# Patient Record
Sex: MALE | Race: BLACK OR AFRICAN AMERICAN | Employment: PART TIME | URBAN - METROPOLITAN AREA
[De-identification: names, ages, dates, MRNs, and addresses within clinical notes are randomized per-mention and may not be internally consistent; named-entity substitution may affect disease eponyms.]

---

## 2017-07-19 PROBLEM — M47.816 LUMBAR FACET ARTHROPATHY: Chronic | Status: ACTIVE | Noted: 2017-07-19

## 2017-07-19 PROBLEM — M46.1 SACROILIITIS (HCC): Chronic | Status: ACTIVE | Noted: 2017-07-19

## 2017-07-19 PROBLEM — M54.50 CHRONIC LOW BACK PAIN: Chronic | Status: ACTIVE | Noted: 2017-07-19

## 2017-07-19 PROBLEM — G89.29 CHRONIC LOW BACK PAIN: Chronic | Status: ACTIVE | Noted: 2017-07-19

## 2017-08-17 PROBLEM — M99.09 SOMATIC DYSFUNCTION OF BACK: Chronic | Status: ACTIVE | Noted: 2017-08-17

## 2017-09-20 PROBLEM — G89.4 CHRONIC PAIN SYNDROME: Chronic | Status: ACTIVE | Noted: 2017-09-20

## 2017-10-25 PROBLEM — M51.26 DISC DISPLACEMENT, LUMBAR: Chronic | Status: ACTIVE | Noted: 2017-10-25

## 2017-10-25 PROBLEM — M48.061 NEURAL FORAMINAL STENOSIS OF LUMBAR SPINE: Chronic | Status: ACTIVE | Noted: 2017-10-25

## 2018-07-27 RX ORDER — TRAMADOL HYDROCHLORIDE 50 MG/1
50 TABLET ORAL EVERY 6 HOURS PRN
COMMUNITY
End: 2019-06-27 | Stop reason: ALTCHOICE

## 2018-07-29 NOTE — H&P
Patient Name: Fay Burton is a 69 y.o. male    Chief Complaint of:  Decreased vision of the right eye    Present Illness:  Mature right cataract with decreased vision. Risks and complications as well as options and benefits were discussed with the patient and he has elected to proceed with right cataract extraction with intra ocular lens implant. History:    Past Medical History:   Diagnosis Date    Cancer [facility])     prostate just recently DX July 2018    History of blood transfusion     1985 after \"ulcer surgery\"    Hyperlipidemia     Hypertension     Pain     low back, sacral        Pertinent   Family History:  family history includes Cancer in his father and mother. Medications:    No current facility-administered medications for this encounter. Current Outpatient Prescriptions:     traMADol (ULTRAM) 50 MG tablet, Take 50 mg by mouth every 6 hours as needed for Pain. ., Disp: , Rfl:     atenolol (TENORMIN) 25 MG tablet, Take 1 tablet by mouth daily, Disp: , Rfl: 1    baclofen (LIORESAL) 10 MG tablet, 1/2 to 1 tablet nightly prn, Disp: 30 tablet, Rfl: 0    amLODIPine (NORVASC) 10 MG tablet, Take 10 mg by mouth daily, Disp: , Rfl:     simvastatin (ZOCOR) 10 MG tablet, Take 20 mg by mouth nightly , Disp: , Rfl:       Allergies:   Codeine and Motrin [ibuprofen micronized]       PHYSICAL EXAMINATION / GENERAL APPEARANCE:    HEAD: Mature right cataract with decreased vision effecting reading, driving and all routine home activities. The patient complains of severe glare and halos around headlights making it nearly impossible to continue to drive at night. Visual acuity is 20/60    HEART: negative    LUNGS: clear    ABDOMEN: exam deferred    OTHER SIGNIFICANT FINDINGS:  Cataract left eye, K. Sicca    IMPRESSION/INITIAL DIAGNOSIS:  Mature right cataract      Electronically signed by Jesus Rosario MD  on 7/29/2018 at 2:12 PM

## 2018-07-30 ENCOUNTER — ANESTHESIA EVENT (OUTPATIENT)
Dept: OPERATING ROOM | Age: 68
End: 2018-07-30
Payer: MEDICARE

## 2018-07-30 ASSESSMENT — LIFESTYLE VARIABLES: SMOKING_STATUS: 0

## 2018-07-30 NOTE — ANESTHESIA PRE PROCEDURE
Department of Anesthesiology  Preprocedure Note       Name:  Zelda Batres   Age:  69 y.o.  :  1950                                          MRN:  [id number]         Date:  2018      Surgeon: Misael Griffith):  Carmen Marrero MD    Procedure: Procedure(s):  CATARACT EXTRACTION WITH IOL RIGHT EYE    Medications prior to admission:   Prior to Admission medications    Medication Sig Start Date End Date Taking? Authorizing Provider   traMADol (ULTRAM) 50 MG tablet Take 50 mg by mouth every 6 hours as needed for Pain. .   Yes Historical Provider, MD   atenolol (TENORMIN) 25 MG tablet Take 1 tablet by mouth daily 17   Historical Provider, MD   baclofen (LIORESAL) 10 MG tablet 1/2 to 1 tablet nightly prn 18   Shanda Bumpers, MD   amLODIPine (NORVASC) 10 MG tablet Take 10 mg by mouth daily    Historical Provider, MD   simvastatin (ZOCOR) 10 MG tablet Take 20 mg by mouth nightly     Historical Provider, MD       Current medications:    No current facility-administered medications for this encounter. Current Outpatient Prescriptions   Medication Sig Dispense Refill    traMADol (ULTRAM) 50 MG tablet Take 50 mg by mouth every 6 hours as needed for Pain. 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    Coags: No results found for: PROTIME, INR, APTT    HCG (If Applicable): No results found for: PREGTESTUR, PREGSERUM, HCG, HCGQUANT     ABGs: No results found for: PHART, PO2ART, OWH6YQE, TTH0PKV, BEART, Y8HXQOWV     Type & Screen (If Applicable):  No results found for: LABABO, [address]    Anesthesia Evaluation  Patient summary reviewed and Nursing notes reviewed  Airway: Mallampati: II  TM distance: >3 FB   Neck ROM: full  Comment: Receding chin  Mouth opening: > = 3 FB Dental: normal exam         Pulmonary: breath sounds clear to auscultation      (-) not a current smoker                           Cardiovascular:  Exercise tolerance: good (>4 METS),   (+) hypertension:, hyperlipidemia        Rhythm: regular        Cleared by cardiology     Beta Blocker:  Dose within 24 Hrs        PE comment: Bradycardia  Faint HS   Neuro/Psych:                ROS comment: Low back pain GI/Hepatic/Renal:   (+) PUD,          ROS comment: CA prostate. Endo/Other:    (+) : arthritis:., .                 Abdominal:         (-) obese     Vascular:           ROS comment: Blood transfusion. Anesthesia Plan      MAC     ASA 3       Induction: intravenous. Anesthetic plan and risks discussed with patient and spouse. Plan discussed with CRNA.                 Jas Galvin MD   7/30/2018

## 2018-07-31 ENCOUNTER — ANESTHESIA (OUTPATIENT)
Dept: OPERATING ROOM | Age: 68
End: 2018-07-31
Payer: MEDICARE

## 2018-07-31 ENCOUNTER — HOSPITAL ENCOUNTER (OUTPATIENT)
Age: 68
Setting detail: OUTPATIENT SURGERY
Discharge: HOME | End: 2018-07-31
Attending: OPHTHALMOLOGY | Admitting: OPHTHALMOLOGY
Payer: MEDICARE

## 2018-07-31 VITALS
WEIGHT: 171 LBS | DIASTOLIC BLOOD PRESSURE: 71 MMHG | RESPIRATION RATE: 16 BRPM | HEIGHT: 69 IN | OXYGEN SATURATION: 96 % | HEART RATE: 44 BPM | SYSTOLIC BLOOD PRESSURE: 143 MMHG | BODY MASS INDEX: 25.33 KG/M2

## 2018-07-31 VITALS
SYSTOLIC BLOOD PRESSURE: 112 MMHG | RESPIRATION RATE: 13 BRPM | DIASTOLIC BLOOD PRESSURE: 64 MMHG | OXYGEN SATURATION: 100 %

## 2018-07-31 PROBLEM — H26.9 RIGHT CATARACT: Status: ACTIVE | Noted: 2018-07-31

## 2018-07-31 PROCEDURE — 7100000010 HC PHASE II RECOVERY - FIRST 15 MIN: Performed by: OPHTHALMOLOGY

## 2018-07-31 PROCEDURE — 3600000003 HC SURGERY LEVEL 3 BASE: Performed by: OPHTHALMOLOGY

## 2018-07-31 PROCEDURE — 2500000003 HC RX 250 WO HCPCS: Performed by: OPHTHALMOLOGY

## 2018-07-31 PROCEDURE — 2580000003 HC RX 258: Performed by: ANESTHESIOLOGY

## 2018-07-31 PROCEDURE — 6360000002 HC RX W HCPCS: Performed by: NURSE ANESTHETIST, CERTIFIED REGISTERED

## 2018-07-31 PROCEDURE — 7100000011 HC PHASE II RECOVERY - ADDTL 15 MIN: Performed by: OPHTHALMOLOGY

## 2018-07-31 PROCEDURE — V2632 POST CHMBR INTRAOCULAR LENS: HCPCS | Performed by: OPHTHALMOLOGY

## 2018-07-31 PROCEDURE — 6370000000 HC RX 637 (ALT 250 FOR IP): Performed by: OPHTHALMOLOGY

## 2018-07-31 PROCEDURE — 2709999900 HC NON-CHARGEABLE SUPPLY: Performed by: OPHTHALMOLOGY

## 2018-07-31 PROCEDURE — 2500000003 HC RX 250 WO HCPCS: Performed by: NURSE ANESTHETIST, CERTIFIED REGISTERED

## 2018-07-31 PROCEDURE — 3700000000 HC ANESTHESIA ATTENDED CARE: Performed by: OPHTHALMOLOGY

## 2018-07-31 DEVICE — LENS INTOCU +19.5 DIOPT A CONSTANT 118.8 L13MM DIA6MM 0DEG: Type: IMPLANTABLE DEVICE | Site: EYE | Status: FUNCTIONAL

## 2018-07-31 RX ORDER — FENTANYL CITRATE 50 UG/ML
50 INJECTION, SOLUTION INTRAMUSCULAR; INTRAVENOUS EVERY 5 MIN PRN
Status: DISCONTINUED | OUTPATIENT
Start: 2018-07-31 | End: 2018-07-31 | Stop reason: HOSPADM

## 2018-07-31 RX ORDER — FLURBIPROFEN SODIUM 0.3 MG/ML
1 SOLUTION/ DROPS OPHTHALMIC
Status: COMPLETED | OUTPATIENT
Start: 2018-07-31 | End: 2018-07-31

## 2018-07-31 RX ORDER — PHENYLEPHRINE HYDROCHLORIDE 100 MG/ML
1 SOLUTION/ DROPS OPHTHALMIC PRN
Status: DISCONTINUED | OUTPATIENT
Start: 2018-07-31 | End: 2018-07-31 | Stop reason: HOSPADM

## 2018-07-31 RX ORDER — ALFENTANIL HYDROCHLORIDE 500 UG/ML
INJECTION INTRAVENOUS PRN
Status: DISCONTINUED | OUTPATIENT
Start: 2018-07-31 | End: 2018-07-31 | Stop reason: SDUPTHER

## 2018-07-31 RX ORDER — MIDAZOLAM HYDROCHLORIDE 1 MG/ML
INJECTION INTRAMUSCULAR; INTRAVENOUS PRN
Status: DISCONTINUED | OUTPATIENT
Start: 2018-07-31 | End: 2018-07-31 | Stop reason: SDUPTHER

## 2018-07-31 RX ORDER — PHENYLEPHRINE HCL 2.5 %
1 DROPS OPHTHALMIC (EYE)
Status: COMPLETED | OUTPATIENT
Start: 2018-07-31 | End: 2018-07-31

## 2018-07-31 RX ORDER — PROMETHAZINE HYDROCHLORIDE 25 MG/ML
25 INJECTION, SOLUTION INTRAMUSCULAR; INTRAVENOUS
Status: DISCONTINUED | OUTPATIENT
Start: 2018-07-31 | End: 2018-07-31 | Stop reason: HOSPADM

## 2018-07-31 RX ORDER — HYDROCODONE BITARTRATE AND ACETAMINOPHEN 5; 325 MG/1; MG/1
2 TABLET ORAL PRN
Status: DISCONTINUED | OUTPATIENT
Start: 2018-07-31 | End: 2018-07-31 | Stop reason: HOSPADM

## 2018-07-31 RX ORDER — CYCLOPENTOLATE HYDROCHLORIDE 10 MG/ML
1 SOLUTION/ DROPS OPHTHALMIC
Status: COMPLETED | OUTPATIENT
Start: 2018-07-31 | End: 2018-07-31

## 2018-07-31 RX ORDER — TETRACAINE HYDROCHLORIDE 5 MG/ML
1 SOLUTION OPHTHALMIC ONCE
Status: COMPLETED | OUTPATIENT
Start: 2018-07-31 | End: 2018-07-31

## 2018-07-31 RX ORDER — MORPHINE SULFATE 2 MG/ML
1 INJECTION, SOLUTION INTRAMUSCULAR; INTRAVENOUS EVERY 5 MIN PRN
Status: DISCONTINUED | OUTPATIENT
Start: 2018-07-31 | End: 2018-07-31 | Stop reason: HOSPADM

## 2018-07-31 RX ORDER — TETRACAINE HYDROCHLORIDE 5 MG/ML
SOLUTION OPHTHALMIC PRN
Status: DISCONTINUED | OUTPATIENT
Start: 2018-07-31 | End: 2018-07-31 | Stop reason: HOSPADM

## 2018-07-31 RX ORDER — HYDROCODONE BITARTRATE AND ACETAMINOPHEN 5; 325 MG/1; MG/1
1 TABLET ORAL PRN
Status: DISCONTINUED | OUTPATIENT
Start: 2018-07-31 | End: 2018-07-31 | Stop reason: HOSPADM

## 2018-07-31 RX ORDER — MEPERIDINE HYDROCHLORIDE 25 MG/ML
12.5 INJECTION INTRAMUSCULAR; INTRAVENOUS; SUBCUTANEOUS EVERY 5 MIN PRN
Status: DISCONTINUED | OUTPATIENT
Start: 2018-07-31 | End: 2018-07-31 | Stop reason: HOSPADM

## 2018-07-31 RX ORDER — BALANCED SALT SOLUTION 6.4; .75; .48; .3; 3.9; 1.7 MG/ML; MG/ML; MG/ML; MG/ML; MG/ML; MG/ML
SOLUTION OPHTHALMIC PRN
Status: DISCONTINUED | OUTPATIENT
Start: 2018-07-31 | End: 2018-07-31 | Stop reason: HOSPADM

## 2018-07-31 RX ORDER — LABETALOL HYDROCHLORIDE 5 MG/ML
5 INJECTION, SOLUTION INTRAVENOUS EVERY 10 MIN PRN
Status: DISCONTINUED | OUTPATIENT
Start: 2018-07-31 | End: 2018-07-31 | Stop reason: HOSPADM

## 2018-07-31 RX ORDER — SODIUM CHLORIDE, SODIUM LACTATE, POTASSIUM CHLORIDE, CALCIUM CHLORIDE 600; 310; 30; 20 MG/100ML; MG/100ML; MG/100ML; MG/100ML
INJECTION, SOLUTION INTRAVENOUS CONTINUOUS
Status: DISCONTINUED | OUTPATIENT
Start: 2018-07-31 | End: 2018-07-31 | Stop reason: HOSPADM

## 2018-07-31 RX ORDER — HYDRALAZINE HYDROCHLORIDE 20 MG/ML
5 INJECTION INTRAMUSCULAR; INTRAVENOUS EVERY 10 MIN PRN
Status: DISCONTINUED | OUTPATIENT
Start: 2018-07-31 | End: 2018-07-31 | Stop reason: HOSPADM

## 2018-07-31 RX ORDER — DIPHENHYDRAMINE HYDROCHLORIDE 50 MG/ML
12.5 INJECTION INTRAMUSCULAR; INTRAVENOUS
Status: DISCONTINUED | OUTPATIENT
Start: 2018-07-31 | End: 2018-07-31 | Stop reason: HOSPADM

## 2018-07-31 RX ADMIN — PHENYLEPHRINE HYDROCHLORIDE 1 DROP: 25 SOLUTION/ DROPS OPHTHALMIC at 08:10

## 2018-07-31 RX ADMIN — MIDAZOLAM 1 MG: 1 INJECTION INTRAMUSCULAR; INTRAVENOUS at 09:36

## 2018-07-31 RX ADMIN — MIDAZOLAM 1 MG: 1 INJECTION INTRAMUSCULAR; INTRAVENOUS at 09:34

## 2018-07-31 RX ADMIN — CYCLOPENTOLATE HYDROCHLORIDE 1 DROP: 10 SOLUTION/ DROPS OPHTHALMIC at 08:10

## 2018-07-31 RX ADMIN — SODIUM CHLORIDE, POTASSIUM CHLORIDE, SODIUM LACTATE AND CALCIUM CHLORIDE: 600; 310; 30; 20 INJECTION, SOLUTION INTRAVENOUS at 08:30

## 2018-07-31 RX ADMIN — ALFENTANIL HYDROCHLORIDE 250 MCG: 500 INJECTION INTRAVENOUS at 09:36

## 2018-07-31 RX ADMIN — ALFENTANIL HYDROCHLORIDE 250 MCG: 500 INJECTION INTRAVENOUS at 09:34

## 2018-07-31 RX ADMIN — FLURBIPROFEN SODIUM 1 DROP: 0.3 SOLUTION/ DROPS OPHTHALMIC at 08:10

## 2018-07-31 RX ADMIN — PHENYLEPHRINE HYDROCHLORIDE 1 DROP: 25 SOLUTION/ DROPS OPHTHALMIC at 08:20

## 2018-07-31 RX ADMIN — FLURBIPROFEN SODIUM 1 DROP: 0.3 SOLUTION/ DROPS OPHTHALMIC at 08:15

## 2018-07-31 RX ADMIN — FLURBIPROFEN SODIUM 1 DROP: 0.3 SOLUTION/ DROPS OPHTHALMIC at 08:20

## 2018-07-31 RX ADMIN — TETRACAINE HYDROCHLORIDE 1 DROP: 25 LIQUID OPHTHALMIC at 08:37

## 2018-07-31 RX ADMIN — CYCLOPENTOLATE HYDROCHLORIDE 1 DROP: 10 SOLUTION/ DROPS OPHTHALMIC at 08:20

## 2018-07-31 RX ADMIN — PHENYLEPHRINE HYDROCHLORIDE 1 DROP: 25 SOLUTION/ DROPS OPHTHALMIC at 08:15

## 2018-07-31 RX ADMIN — CYCLOPENTOLATE HYDROCHLORIDE 1 DROP: 10 SOLUTION/ DROPS OPHTHALMIC at 08:15

## 2018-07-31 ASSESSMENT — PULMONARY FUNCTION TESTS
PIF_VALUE: 0

## 2018-07-31 ASSESSMENT — PAIN SCALES - GENERAL
PAINLEVEL_OUTOF10: 0

## 2018-07-31 ASSESSMENT — PAIN - FUNCTIONAL ASSESSMENT: PAIN_FUNCTIONAL_ASSESSMENT: 0-10

## 2018-07-31 NOTE — ANESTHESIA POSTPROCEDURE EVALUATION
Department of Anesthesiology  Postprocedure Note    Patient: Edmundo Garcia  MRN: [id number]  YOB: 1950  Date of evaluation: 7/31/2018  Time:  9:58 AM     Procedure Summary     Date:  07/31/18 Room / Location:  [address]    Anesthesia Start:  0934 Anesthesia Stop:  Nel Manleyfitoedna    Procedure:  CATARACT EXTRACTION WITH IOL RIGHT EYE (Right ) Diagnosis:  (CATARACT RIGHT)    Surgeon:  Jennifer Fraire MD Responsible Provider:  Basilia Sood MD    Anesthesia Type:  MAC ASA Status:  3          Anesthesia Type: MAC    Devi Phase I: Devi Score: 10    Devi Phase II: Devi Score: 10    Last vitals: Reviewed and per EMR flowsheets.        Anesthesia Post Evaluation    Patient location during evaluation: PACU  Patient participation: complete - patient participated  Level of consciousness: awake and alert  Airway patency: patent  Nausea & Vomiting: no nausea and no vomiting  Complications: no  Cardiovascular status: bradycardic (bradycardia preop)  Respiratory status: room air and spontaneous ventilation  Hydration status: stable

## 2019-06-25 NOTE — H&P
grossly intact. Motor is 5 out of 5 bilaterally. Cerebellar finger to nose, heel to shin intact. Sensory is intact. Babinski down going, Romberg negative, and gait is normal.  SKIN:  no bruising or bleeding, normal skin color, texture, turgor and no redness, warmth, or swelling    Assessment:  Active Problems:    * No active hospital problems. *  Resolved Problems:    * No resolved hospital problems. *      Plan:  1. Left cataract extraction with intra ocular lens implant.     Electronically signed by Mami Ferro MD on 6/25/19 at 1:02 PM

## 2019-06-27 ENCOUNTER — HOSPITAL ENCOUNTER (OUTPATIENT)
Age: 69
Discharge: HOME | End: 2019-06-27
Payer: MEDICARE

## 2019-06-27 PROCEDURE — 93005 ELECTROCARDIOGRAM TRACING: CPT | Performed by: ANESTHESIOLOGY

## 2019-06-28 ENCOUNTER — ANESTHESIA EVENT (OUTPATIENT)
Dept: OPERATING ROOM | Age: 69
End: 2019-06-28
Payer: MEDICARE

## 2019-06-28 LAB
EKG ATRIAL RATE: 47 BPM
EKG P AXIS: 63 DEGREES
EKG P-R INTERVAL: 160 MS
EKG Q-T INTERVAL: 416 MS
EKG QRS DURATION: 70 MS
EKG QTC CALCULATION (BAZETT): 368 MS
EKG R AXIS: 21 DEGREES
EKG T AXIS: 44 DEGREES
EKG VENTRICULAR RATE: 47 BPM

## 2019-06-28 PROCEDURE — 93010 ELECTROCARDIOGRAM REPORT: CPT | Performed by: INTERNAL MEDICINE

## 2019-06-28 ASSESSMENT — LIFESTYLE VARIABLES: SMOKING_STATUS: 0

## 2019-07-02 ENCOUNTER — ANESTHESIA (OUTPATIENT)
Dept: OPERATING ROOM | Age: 69
End: 2019-07-02
Payer: MEDICARE

## 2019-07-02 ENCOUNTER — HOSPITAL ENCOUNTER (OUTPATIENT)
Age: 69
Setting detail: OUTPATIENT SURGERY
Discharge: HOME | End: 2019-07-02
Attending: OPHTHALMOLOGY | Admitting: OPHTHALMOLOGY
Payer: MEDICARE

## 2019-07-02 VITALS — OXYGEN SATURATION: 100 % | SYSTOLIC BLOOD PRESSURE: 116 MMHG | DIASTOLIC BLOOD PRESSURE: 69 MMHG

## 2019-07-02 VITALS
WEIGHT: 166 LBS | OXYGEN SATURATION: 100 % | HEART RATE: 48 BPM | TEMPERATURE: 97 F | RESPIRATION RATE: 16 BRPM | BODY MASS INDEX: 24.59 KG/M2 | HEIGHT: 69 IN | SYSTOLIC BLOOD PRESSURE: 139 MMHG | DIASTOLIC BLOOD PRESSURE: 77 MMHG

## 2019-07-02 PROBLEM — H26.9 LEFT CATARACT: Status: ACTIVE | Noted: 2019-07-02

## 2019-07-02 PROCEDURE — V2632 POST CHMBR INTRAOCULAR LENS: HCPCS | Performed by: OPHTHALMOLOGY

## 2019-07-02 PROCEDURE — 3600000003 HC SURGERY LEVEL 3 BASE: Performed by: OPHTHALMOLOGY

## 2019-07-02 PROCEDURE — 7100000011 HC PHASE II RECOVERY - ADDTL 15 MIN: Performed by: OPHTHALMOLOGY

## 2019-07-02 PROCEDURE — 6360000002 HC RX W HCPCS: Performed by: NURSE ANESTHETIST, CERTIFIED REGISTERED

## 2019-07-02 PROCEDURE — 6370000000 HC RX 637 (ALT 250 FOR IP): Performed by: OPHTHALMOLOGY

## 2019-07-02 PROCEDURE — 2500000003 HC RX 250 WO HCPCS: Performed by: OPHTHALMOLOGY

## 2019-07-02 PROCEDURE — 7100000010 HC PHASE II RECOVERY - FIRST 15 MIN: Performed by: OPHTHALMOLOGY

## 2019-07-02 PROCEDURE — 2580000003 HC RX 258: Performed by: ANESTHESIOLOGY

## 2019-07-02 PROCEDURE — 3700000000 HC ANESTHESIA ATTENDED CARE: Performed by: OPHTHALMOLOGY

## 2019-07-02 PROCEDURE — 3600000013 HC SURGERY LEVEL 3 ADDTL 15MIN: Performed by: OPHTHALMOLOGY

## 2019-07-02 PROCEDURE — 3700000001 HC ADD 15 MINUTES (ANESTHESIA): Performed by: OPHTHALMOLOGY

## 2019-07-02 PROCEDURE — 2709999900 HC NON-CHARGEABLE SUPPLY: Performed by: OPHTHALMOLOGY

## 2019-07-02 PROCEDURE — 2500000003 HC RX 250 WO HCPCS: Performed by: NURSE ANESTHETIST, CERTIFIED REGISTERED

## 2019-07-02 DEVICE — LENS INTOCU +20.0 DIOPT A CONSTANT 118.8 L13MM DIA6MM 0DEG: Type: IMPLANTABLE DEVICE | Site: EYE | Status: FUNCTIONAL

## 2019-07-02 RX ORDER — LABETALOL 20 MG/4 ML (5 MG/ML) INTRAVENOUS SYRINGE
5 EVERY 10 MIN PRN
Status: DISCONTINUED | OUTPATIENT
Start: 2019-07-02 | End: 2019-07-02 | Stop reason: HOSPADM

## 2019-07-02 RX ORDER — BALANCED SALT SOLUTION 6.4; .75; .48; .3; 3.9; 1.7 MG/ML; MG/ML; MG/ML; MG/ML; MG/ML; MG/ML
SOLUTION OPHTHALMIC PRN
Status: DISCONTINUED | OUTPATIENT
Start: 2019-07-02 | End: 2019-07-02 | Stop reason: ALTCHOICE

## 2019-07-02 RX ORDER — DIPHENHYDRAMINE HYDROCHLORIDE 50 MG/ML
12.5 INJECTION INTRAMUSCULAR; INTRAVENOUS
Status: DISCONTINUED | OUTPATIENT
Start: 2019-07-02 | End: 2019-07-02 | Stop reason: HOSPADM

## 2019-07-02 RX ORDER — MORPHINE SULFATE 2 MG/ML
1 INJECTION, SOLUTION INTRAMUSCULAR; INTRAVENOUS EVERY 5 MIN PRN
Status: DISCONTINUED | OUTPATIENT
Start: 2019-07-02 | End: 2019-07-02 | Stop reason: HOSPADM

## 2019-07-02 RX ORDER — FENTANYL CITRATE 50 UG/ML
50 INJECTION, SOLUTION INTRAMUSCULAR; INTRAVENOUS EVERY 5 MIN PRN
Status: DISCONTINUED | OUTPATIENT
Start: 2019-07-02 | End: 2019-07-02 | Stop reason: HOSPADM

## 2019-07-02 RX ORDER — SODIUM CHLORIDE, SODIUM LACTATE, POTASSIUM CHLORIDE, CALCIUM CHLORIDE 600; 310; 30; 20 MG/100ML; MG/100ML; MG/100ML; MG/100ML
INJECTION, SOLUTION INTRAVENOUS CONTINUOUS
Status: DISCONTINUED | OUTPATIENT
Start: 2019-07-02 | End: 2019-07-02 | Stop reason: HOSPADM

## 2019-07-02 RX ORDER — PHENYLEPHRINE HYDROCHLORIDE 100 MG/ML
1 SOLUTION/ DROPS OPHTHALMIC PRN
Status: DISCONTINUED | OUTPATIENT
Start: 2019-07-02 | End: 2019-07-02 | Stop reason: HOSPADM

## 2019-07-02 RX ORDER — HYDROCODONE BITARTRATE AND ACETAMINOPHEN 5; 325 MG/1; MG/1
2 TABLET ORAL PRN
Status: DISCONTINUED | OUTPATIENT
Start: 2019-07-02 | End: 2019-07-02 | Stop reason: HOSPADM

## 2019-07-02 RX ORDER — MIDAZOLAM HYDROCHLORIDE 1 MG/ML
INJECTION INTRAMUSCULAR; INTRAVENOUS PRN
Status: DISCONTINUED | OUTPATIENT
Start: 2019-07-02 | End: 2019-07-02 | Stop reason: SDUPTHER

## 2019-07-02 RX ORDER — FLURBIPROFEN SODIUM 0.3 MG/ML
1 SOLUTION/ DROPS OPHTHALMIC
Status: COMPLETED | OUTPATIENT
Start: 2019-07-02 | End: 2019-07-02

## 2019-07-02 RX ORDER — ALFENTANIL HYDROCHLORIDE 500 UG/ML
INJECTION INTRAVENOUS PRN
Status: DISCONTINUED | OUTPATIENT
Start: 2019-07-02 | End: 2019-07-02 | Stop reason: SDUPTHER

## 2019-07-02 RX ORDER — HYDRALAZINE HYDROCHLORIDE 20 MG/ML
5 INJECTION INTRAMUSCULAR; INTRAVENOUS EVERY 10 MIN PRN
Status: DISCONTINUED | OUTPATIENT
Start: 2019-07-02 | End: 2019-07-02 | Stop reason: HOSPADM

## 2019-07-02 RX ORDER — HYDROMORPHONE HYDROCHLORIDE 1 MG/ML
0.25 INJECTION, SOLUTION INTRAMUSCULAR; INTRAVENOUS; SUBCUTANEOUS EVERY 5 MIN PRN
Status: DISCONTINUED | OUTPATIENT
Start: 2019-07-02 | End: 2019-07-02 | Stop reason: HOSPADM

## 2019-07-02 RX ORDER — MEPERIDINE HYDROCHLORIDE 50 MG/ML
12.5 INJECTION INTRAMUSCULAR; INTRAVENOUS; SUBCUTANEOUS EVERY 5 MIN PRN
Status: DISCONTINUED | OUTPATIENT
Start: 2019-07-02 | End: 2019-07-02 | Stop reason: HOSPADM

## 2019-07-02 RX ORDER — PROMETHAZINE HYDROCHLORIDE 25 MG/ML
25 INJECTION, SOLUTION INTRAMUSCULAR; INTRAVENOUS
Status: DISCONTINUED | OUTPATIENT
Start: 2019-07-02 | End: 2019-07-02 | Stop reason: HOSPADM

## 2019-07-02 RX ORDER — TETRACAINE HYDROCHLORIDE 5 MG/ML
SOLUTION OPHTHALMIC PRN
Status: DISCONTINUED | OUTPATIENT
Start: 2019-07-02 | End: 2019-07-02 | Stop reason: ALTCHOICE

## 2019-07-02 RX ORDER — PHENYLEPHRINE HCL 2.5 %
1 DROPS OPHTHALMIC (EYE)
Status: COMPLETED | OUTPATIENT
Start: 2019-07-02 | End: 2019-07-02

## 2019-07-02 RX ORDER — CYCLOPENTOLATE HYDROCHLORIDE 10 MG/ML
1 SOLUTION/ DROPS OPHTHALMIC
Status: COMPLETED | OUTPATIENT
Start: 2019-07-02 | End: 2019-07-02

## 2019-07-02 RX ORDER — HYDROCODONE BITARTRATE AND ACETAMINOPHEN 5; 325 MG/1; MG/1
1 TABLET ORAL PRN
Status: DISCONTINUED | OUTPATIENT
Start: 2019-07-02 | End: 2019-07-02 | Stop reason: HOSPADM

## 2019-07-02 RX ORDER — TETRACAINE HYDROCHLORIDE 5 MG/ML
1 SOLUTION OPHTHALMIC ONCE
Status: COMPLETED | OUTPATIENT
Start: 2019-07-02 | End: 2019-07-02

## 2019-07-02 RX ADMIN — CYCLOPENTOLATE HYDROCHLORIDE 1 DROP: 10 SOLUTION/ DROPS OPHTHALMIC at 05:45

## 2019-07-02 RX ADMIN — TETRACAINE HYDROCHLORIDE 1 DROP: 25 LIQUID OPHTHALMIC at 06:11

## 2019-07-02 RX ADMIN — MIDAZOLAM 1 MG: 1 INJECTION INTRAMUSCULAR; INTRAVENOUS at 06:32

## 2019-07-02 RX ADMIN — PHENYLEPHRINE HYDROCHLORIDE 1 DROP: 25 SOLUTION/ DROPS OPHTHALMIC at 05:35

## 2019-07-02 RX ADMIN — PHENYLEPHRINE HYDROCHLORIDE 1 DROP: 25 SOLUTION/ DROPS OPHTHALMIC at 05:40

## 2019-07-02 RX ADMIN — CYCLOPENTOLATE HYDROCHLORIDE 1 DROP: 10 SOLUTION/ DROPS OPHTHALMIC at 05:35

## 2019-07-02 RX ADMIN — MIDAZOLAM 1 MG: 1 INJECTION INTRAMUSCULAR; INTRAVENOUS at 06:35

## 2019-07-02 RX ADMIN — PHENYLEPHRINE HYDROCHLORIDE 1 DROP: 25 SOLUTION/ DROPS OPHTHALMIC at 05:45

## 2019-07-02 RX ADMIN — CYCLOPENTOLATE HYDROCHLORIDE 1 DROP: 10 SOLUTION/ DROPS OPHTHALMIC at 05:40

## 2019-07-02 RX ADMIN — ALFENTANIL HYDROCHLORIDE 250 MCG: 500 INJECTION INTRAVENOUS at 06:35

## 2019-07-02 RX ADMIN — FLURBIPROFEN SODIUM 1 DROP: 0.3 SOLUTION/ DROPS OPHTHALMIC at 05:45

## 2019-07-02 RX ADMIN — FLURBIPROFEN SODIUM 1 DROP: 0.3 SOLUTION/ DROPS OPHTHALMIC at 05:40

## 2019-07-02 RX ADMIN — ALFENTANIL HYDROCHLORIDE 250 MCG: 500 INJECTION INTRAVENOUS at 06:38

## 2019-07-02 RX ADMIN — FLURBIPROFEN SODIUM 1 DROP: 0.3 SOLUTION/ DROPS OPHTHALMIC at 05:35

## 2019-07-02 RX ADMIN — SODIUM CHLORIDE, POTASSIUM CHLORIDE, SODIUM LACTATE AND CALCIUM CHLORIDE: 600; 310; 30; 20 INJECTION, SOLUTION INTRAVENOUS at 06:09

## 2019-07-02 RX ADMIN — ALFENTANIL HYDROCHLORIDE 250 MCG: 500 INJECTION INTRAVENOUS at 06:43

## 2019-07-02 ASSESSMENT — PULMONARY FUNCTION TESTS
PIF_VALUE: 0

## 2019-07-02 ASSESSMENT — PAIN - FUNCTIONAL ASSESSMENT: PAIN_FUNCTIONAL_ASSESSMENT: 0-10

## 2019-07-02 ASSESSMENT — PAIN SCALES - GENERAL
PAINLEVEL_OUTOF10: 0

## 2019-07-02 NOTE — OP NOTE
PREOPERATIVE DIAGNOSIS:  Complex Left Cataract    POSTOPERATIVE DIAGNOSIS:  Complex Left Cataract    PROCEDURE:  Left phacoemulsification of complex cataract with intraocular lens implant. ANESTHESIA:  Local Mac    ESTIMATED BLOOD LOSS:  Minimal    COMPLICATIONS:  None    DESCRIPTION OF PROCEDURE:  The patient was brought into the operating room. The operative eye was marked, which was the left eye. The left eye was then prepped with a full-strength Betadine preparation. The periorbital area was copiously washed with Betadine. The lashes were washed with Betadine. Dilute Betadine was then also put in the inferior and superior fornices and left in place for approximately a minute or 2. This was then irrigated with sterile water. The face was then wiped and the preparation was repeated 1 more time. The drape was then placed over the operative eye with the sticky adhesive placed at the lid margins so that it draped the lashes out of the operative field superiorly and inferiorly, as well as isolated the meibomian glands behind the drape. This cleared the operative field of any meibomian gland secretions and eyelashes. Next, a lid speculum was positioned in the left eye. A super sharp blade was used to make a side-port incision at the 2 o'clock position. A clear corneal incision was fashioned over the 12 o;clock meridian with a 2.3mm keratome at the limbus. After the superior incision was made, VisionBlue was instilled in the anterior segment. Viscoat was used to express the VisionBlue out of the eye and stabilize the anterior segment. The VisionBlue stained anterior capsule then had anterior capsulotomy performed with a bent needle cystotome. Hydrodissection was performed by irrigating with balanced salt solution through a syringe underneath the anterior capsular flap to loosen and allow free rotation of the lens nucleus. Phacoemulsification was used and the entire lens nucleus was removed.   The I A unit was instilled in the eye, and the remaining cortex was removed. Healon was used to separate the anterior and posterior capsular flaps. The PCBOO 20.0 diopter lens was then instilled into the capsular bag and dialed to 3 and 9 o'clock positions. Healon was removed from in front of and behind the lens. The lens was found to be well centered, well positioned in the bag and stable. The wound was checked and found to be airtight and watertight. The lid speculum was removed and the drape was removed. The patient was brought to the recovery room in excellent condition, given postoperative instructions, and discharge in excellent condition.

## 2019-10-08 ENCOUNTER — HOSPITAL ENCOUNTER (EMERGENCY)
Age: 69
Discharge: HOME | End: 2019-10-08
Attending: EMERGENCY MEDICINE
Payer: COMMERCIAL

## 2019-10-08 ENCOUNTER — APPOINTMENT (OUTPATIENT)
Dept: GENERAL RADIOLOGY | Age: 69
End: 2019-10-08
Payer: COMMERCIAL

## 2019-10-08 VITALS
HEIGHT: 69 IN | WEIGHT: 170 LBS | SYSTOLIC BLOOD PRESSURE: 142 MMHG | HEART RATE: 53 BPM | OXYGEN SATURATION: 100 % | RESPIRATION RATE: 16 BRPM | TEMPERATURE: 97.5 F | BODY MASS INDEX: 25.18 KG/M2 | DIASTOLIC BLOOD PRESSURE: 81 MMHG

## 2019-10-08 DIAGNOSIS — S40.011A CONTUSION OF RIGHT SHOULDER, INITIAL ENCOUNTER: ICD-10-CM

## 2019-10-08 DIAGNOSIS — S60.012A CONTUSION OF LEFT THUMB WITHOUT DAMAGE TO NAIL, INITIAL ENCOUNTER: Primary | ICD-10-CM

## 2019-10-08 PROCEDURE — 73130 X-RAY EXAM OF HAND: CPT

## 2019-10-08 PROCEDURE — G0382 LEV 3 HOSP TYPE B ED VISIT: HCPCS

## 2019-10-08 RX ORDER — ACETAMINOPHEN 500 MG
500 TABLET ORAL EVERY 6 HOURS PRN
Qty: 28 TABLET | Refills: 0 | Status: SHIPPED | OUTPATIENT
Start: 2019-10-08 | End: 2020-06-01 | Stop reason: ALTCHOICE

## 2019-10-08 ASSESSMENT — PAIN DESCRIPTION - LOCATION: LOCATION: SHOULDER;WRIST

## 2019-10-08 ASSESSMENT — PAIN DESCRIPTION - PROGRESSION: CLINICAL_PROGRESSION: NOT CHANGED

## 2019-10-08 ASSESSMENT — PAIN DESCRIPTION - DESCRIPTORS: DESCRIPTORS: THROBBING

## 2019-10-08 ASSESSMENT — PAIN DESCRIPTION - ONSET: ONSET: SUDDEN

## 2019-10-08 ASSESSMENT — PAIN DESCRIPTION - ORIENTATION: ORIENTATION: LEFT;RIGHT

## 2019-10-08 ASSESSMENT — PAIN DESCRIPTION - FREQUENCY: FREQUENCY: CONTINUOUS

## 2019-10-08 ASSESSMENT — PAIN SCALES - GENERAL: PAINLEVEL_OUTOF10: 8

## 2019-10-08 ASSESSMENT — PAIN DESCRIPTION - PAIN TYPE: TYPE: ACUTE PAIN

## 2019-12-18 ENCOUNTER — EVALUATION (OUTPATIENT)
Dept: OCCUPATIONAL THERAPY | Age: 69
End: 2019-12-18
Payer: COMMERCIAL

## 2019-12-18 DIAGNOSIS — S60.222A CONTUSION OF LEFT HAND, INITIAL ENCOUNTER: Primary | ICD-10-CM

## 2019-12-18 PROCEDURE — 97140 MANUAL THERAPY 1/> REGIONS: CPT | Performed by: OCCUPATIONAL THERAPIST

## 2019-12-18 PROCEDURE — 97166 OT EVAL MOD COMPLEX 45 MIN: CPT | Performed by: OCCUPATIONAL THERAPIST

## 2019-12-18 PROCEDURE — 97032 APPL MODALITY 1+ESTIM EA 15: CPT | Performed by: OCCUPATIONAL THERAPIST

## 2019-12-19 PROBLEM — S60.222A CONTUSION OF LEFT HAND: Status: ACTIVE | Noted: 2019-12-19

## 2020-01-03 ENCOUNTER — TREATMENT (OUTPATIENT)
Dept: OCCUPATIONAL THERAPY | Age: 70
End: 2020-01-03
Payer: COMMERCIAL

## 2020-01-03 PROCEDURE — 97140 MANUAL THERAPY 1/> REGIONS: CPT | Performed by: OCCUPATIONAL THERAPIST

## 2020-01-03 PROCEDURE — 97110 THERAPEUTIC EXERCISES: CPT | Performed by: OCCUPATIONAL THERAPIST

## 2020-01-03 PROCEDURE — 97022 WHIRLPOOL THERAPY: CPT | Performed by: OCCUPATIONAL THERAPIST

## 2020-01-03 NOTE — PROGRESS NOTES
OCCUPATIONAL THERAPY PROGRESS NOTE    Date:  1/3/2020  Initial Evaluation Date: 19    Patient Name:  Bernadette Dominguez. :  1950    Restrictions/Precautions: Activity as tolerated,  fall risk  Diagnosis:  Contusion of L hand      Insurance/Certification information: - # [id number]  Referring Physician:  Lauren Braswell PA-C  Date of Surgery/Injury: 10/8/19  Plan of care signed (Y/N): N    Visit# / total visits:  (C-9 19 TO 20)    Pain Level: 8 on scale of 1-10, throbbing    Subjective: \"I just can't bend my wrist back\"     Objective:  Updated POC to be completed by 10th visit    INTERVENTION: COMPLETED: SPECIFICS/COMMENTS:   Modality:     Fluidotherapy 15 mins 100% for preconditioning and desensitization. AROM:     Wrist and hand  x All planes (Limited wrist extension)   Towel task x 10 reps   AAROM:               PROM/Stretching:     Gentle PROM  x All planes of wrist and forearm        Scar Mass/Edema Control:     Retrograde Massage x Decrease edema in LUE. Strengthening:               Other:                 Assessment/Comments: Pt is making Fair - progress toward stated plan of care. Pain is limiting AROM and specifically wrist extension of LUE. -Rehab Potential: Good  -Requires OT Follow Up: Yes  Time In: 1010            Time Out: 1100             Visit #: 2    Treatment Charges: Mins Units   Modalities Fluido 15 1   Ther Exercise 10 1   Manual Therapy 25 2   Thera Activities     ADL/Home Mgt      Neuro Re-education     Group Therapy     Non-Billable Service Time     Other     Total Time/Units 50 4       -Response to Treatment: Fair with nerve and throbbing pain throughout session. Goals: Goals for pt can be see on initial eval occurring on 19    Plan:   [x]  Continues Plan of care: Pt education continues at each visit to obtain maximum benefits from skilled OT intervention.   []  Alter Plan of care:   []  Discharge:      Elvira Garner PATEL/L
intervention.   []  [address] of care:   []  Discharge:      [facility] PATEL/JOELLE [id number]

## 2020-01-07 ENCOUNTER — TREATMENT (OUTPATIENT)
Dept: OCCUPATIONAL THERAPY | Age: 70
End: 2020-01-07
Payer: COMMERCIAL

## 2020-01-07 PROCEDURE — 97140 MANUAL THERAPY 1/> REGIONS: CPT | Performed by: OCCUPATIONAL THERAPIST

## 2020-01-07 PROCEDURE — 97110 THERAPEUTIC EXERCISES: CPT | Performed by: OCCUPATIONAL THERAPIST

## 2020-01-07 PROCEDURE — 97022 WHIRLPOOL THERAPY: CPT | Performed by: OCCUPATIONAL THERAPIST

## 2020-01-07 NOTE — PROGRESS NOTES
Ther Exercise  29 2                [id number]   Manual Therapy 16 1                 [id number]   Thera Activities     ADL/Home Mgt      Neuro Re-education     Group Therapy     Non-Billable Service Time     Other: US 5 0                  0855- [id number]   Total Time/Units 60 4        Goals: Goals for pt can be see on initial eval occurring on 12/18/19    Plan:   [x]  Continue Plan of care: Pt education continues at each visit to obtain maximum benefits from skilled OT intervention.   []  [address] of care:   []  Discharge:      Isabel Garrison OT/L  [id number]

## 2020-01-10 ENCOUNTER — TREATMENT (OUTPATIENT)
Dept: OCCUPATIONAL THERAPY | Age: 70
End: 2020-01-10
Payer: COMMERCIAL

## 2020-01-10 PROCEDURE — 97022 WHIRLPOOL THERAPY: CPT | Performed by: OCCUPATIONAL THERAPIST

## 2020-01-10 PROCEDURE — 97140 MANUAL THERAPY 1/> REGIONS: CPT | Performed by: OCCUPATIONAL THERAPIST

## 2020-01-10 PROCEDURE — 97110 THERAPEUTIC EXERCISES: CPT | Performed by: OCCUPATIONAL THERAPIST

## 2020-01-10 NOTE — PROGRESS NOTES
OCCUPATIONAL THERAPY PROGRESS NOTE    Date:  1/10/2020  Initial Evaluation Date: 19    Patient Name:  Amilcar Elliott. :  1950    Restrictions/Precautions: Activity as tolerated,  fall risk  Diagnosis:  Contusion of L hand      Insurance/Certification information: - # [id number]  Referring Physician:  Tonie Russell PA-C  Date of Surgery/Injury: 10/8/19  Plan of care signed (Y/N): N  Visit# / total visits:  (C-9 19 TO 20)    Pain Level: 8 on scale of 1-10, throbbing    Subjective: \"The comp place says they are going to apply for  TENS unit. I'm not sure why my arm hurts so much\". Objective:  Updated POC to be completed by 10th visit    INTERVENTION: COMPLETED: SPECIFICS/COMMENTS:   Modality:     Fluidotherapy 15 mins 100% for preconditioning and desensitization. US x wrist/ forearm 5 min Completed post activity to minimize swelling and pain. AROM:     Wrist and hand  x All planes (Limited wrist extension/ opposition is better- Increased discomfort reported with full digit extension   Prehension ex w/ small marbles x Completed with alternating opposition/ better tolerance today   Towel task x 10 reps   AAROM:     OTD pulley  2 min- incomfortable in the shoulder and wrist        PROM/Stretching:     Gentle PROM  x All planes of wrist and forearm        Scar Mass/Edema Control:     Retrograde Massage/ Soft tissue mob x Decrease edema in LUE/ ease muscle tightness in forearm        Strengthening:               Other: HEP     Wall slides/ hand-wrist-forearm AROM x           Assessment/Comments: Pt is making Fair - progress toward stated plan of care. Pain continues in the wrist and in the forearm. Tightness reported with full excursion of the left arm. TENS unit requested for home pain mgmt. Will continue as tolerated.      -Rehab Potential: Good  -Requires OT Follow Up: Yes  Time In: 1000            Time Out: 0900              Visit #: 3    Treatment Charges: Mins

## 2020-01-14 ENCOUNTER — TREATMENT (OUTPATIENT)
Dept: OCCUPATIONAL THERAPY | Age: 70
End: 2020-01-14
Payer: COMMERCIAL

## 2020-01-14 PROCEDURE — 97032 APPL MODALITY 1+ESTIM EA 15: CPT | Performed by: OCCUPATIONAL THERAPIST

## 2020-01-14 PROCEDURE — 97140 MANUAL THERAPY 1/> REGIONS: CPT | Performed by: OCCUPATIONAL THERAPIST

## 2020-01-14 PROCEDURE — 97110 THERAPEUTIC EXERCISES: CPT | Performed by: OCCUPATIONAL THERAPIST

## 2020-01-14 PROCEDURE — 97022 WHIRLPOOL THERAPY: CPT | Performed by: OCCUPATIONAL THERAPIST

## 2020-01-14 NOTE — PROGRESS NOTES
continue as tolerated. -Rehab Potential: Good  -Requires OT Follow Up: Yes  Time In: 1000          Time Out: 1100         Visit #: 5    Treatment Charges: Mins Units Time in Time out   Modalities Fluido 10 1 10:00 10:10   Ther Exercise 20 1 10:30 10:50   Manual Therapy 20 1 10:10 10:30   Thera Activities       ADL/Home Mgt        Neuro Re-education       Group Therapy       Non-Billable Service Time       Other: TENS 10  10:50 11:00   Total Time/Units 60 4          Goals: Goals for pt can be see on initial eval occurring on 12/18/19    Plan:   [x]  Continue Plan of care: Pt education continues at each visit to obtain maximum benefits from skilled OT intervention.   []  [address] of care:   []  Discharge:      Pietro Edge OT/L  [id number]

## 2020-01-17 ENCOUNTER — TREATMENT (OUTPATIENT)
Dept: OCCUPATIONAL THERAPY | Age: 70
End: 2020-01-17
Payer: COMMERCIAL

## 2020-01-17 PROCEDURE — 97110 THERAPEUTIC EXERCISES: CPT | Performed by: OCCUPATIONAL THERAPIST

## 2020-01-17 PROCEDURE — 97530 THERAPEUTIC ACTIVITIES: CPT | Performed by: OCCUPATIONAL THERAPIST

## 2020-01-17 PROCEDURE — 97022 WHIRLPOOL THERAPY: CPT | Performed by: OCCUPATIONAL THERAPIST

## 2020-01-17 PROCEDURE — 97140 MANUAL THERAPY 1/> REGIONS: CPT | Performed by: OCCUPATIONAL THERAPIST

## 2020-01-22 ENCOUNTER — TREATMENT (OUTPATIENT)
Dept: OCCUPATIONAL THERAPY | Age: 70
End: 2020-01-22
Payer: COMMERCIAL

## 2020-01-22 PROCEDURE — 97110 THERAPEUTIC EXERCISES: CPT | Performed by: OCCUPATIONAL THERAPIST

## 2020-01-22 PROCEDURE — 97140 MANUAL THERAPY 1/> REGIONS: CPT | Performed by: OCCUPATIONAL THERAPIST

## 2020-01-22 PROCEDURE — 97022 WHIRLPOOL THERAPY: CPT | Performed by: OCCUPATIONAL THERAPIST

## 2020-01-22 PROCEDURE — 97530 THERAPEUTIC ACTIVITIES: CPT | Performed by: OCCUPATIONAL THERAPIST

## 2020-01-22 NOTE — PROGRESS NOTES
OCCUPATIONAL THERAPY PROGRESS NOTE    Date:  2020  Initial Evaluation Date: 19    Patient Name:  Dwayne Fernandez. :  1950    Restrictions/Precautions: Activity as tolerated,  fall risk  Diagnosis:  Contusion of L hand      Insurance/Certification information: - # [id number]  Referring Physician:  mK Chavez PA-C  Date of Surgery/Injury: 10/8/19  Plan of care signed (Y/N): N  Visit# / total visits:  (C-9 19 TO 2/15/20)- date is extended    Pain Level: 7 on scale of 1-10, throbbing    Subjective: \" I feel like I am getting better but it still hurts\". Objective:  Updated POC to be completed by 10th visit    INTERVENTION: COMPLETED: SPECIFICS/COMMENTS:   Modality:     Fluidotherapy 10 mins 100% for preconditioning and desensitization. TENS with wrist / forearm AROM as tolerated   tolerated fair+ to help decrease pain from the hand to the proximal forearm   US x wrist/ forearm 5 min Completed post activity to minimize swelling and pain. AROM:     Wrist and hand  x All planes (Limited wrist extension/ opposition is better- Increased discomfort reported with full digit extension   Prehension ex w/ xsmall marbles  Completed with alternating opposition/ intermittent rests needed   Exercise balls x    wristciser 4x More painful today   Towel task x 15 reps   AAROM:     OTD pulley  2 min- incomfortable in the shoulder and wrist        PROM/Stretching:     Gentle PROM  x All planes of wrist and forearm- increased focus on wrist ROM        Scar Mass/Edema Control:     Retrograde Massage/ Soft tissue mob x Decrease edema in LUE/ ease muscle tightness in forearm        Strengthening:     Wrist PREs 1# 1-10 Flexion/ ext and deviation   Putty ex x Gripping x 20, rolling with finger/ wrist extension 2 min   Other: HEP     Wall slides/ hand-wrist-forearm AROM x           Assessment/Comments: Pt is making Fair+ progress toward stated plan of care. Slow progress continues. -Rehab Potential: Good  -Requires OT Follow Up: Yes  Time In: 1030          Time Out: 1130         Visit #: 7    Treatment Charges: Mins Units Time in Time out   Modalities Fluido 10 1 10:30 10:40   Ther Exercise 15 1 10:40 10:55   Manual Therapy 20 1 10:55 11:15   Thera Activities 10 1 11:15 11:25   ADL/Home Mgt        Neuro Re-education       Group Therapy       Non-Billable Service Time       Other: US 5 0 11:25 11:30   Total Time/Units 60 4          Goals: Goals for pt can be see on initial eval occurring on 12/18/19    Plan:   [x]  Continue Plan of care: Pt education continues at each visit to obtain maximum benefits from skilled OT intervention.   []  [address] of care:   []  Discharge:      Consuelo Whelan OT/L  [id number]

## 2020-01-24 ENCOUNTER — TREATMENT (OUTPATIENT)
Dept: OCCUPATIONAL THERAPY | Age: 70
End: 2020-01-24
Payer: COMMERCIAL

## 2020-01-24 PROCEDURE — 97022 WHIRLPOOL THERAPY: CPT | Performed by: OCCUPATIONAL THERAPIST

## 2020-01-24 PROCEDURE — 97530 THERAPEUTIC ACTIVITIES: CPT | Performed by: OCCUPATIONAL THERAPIST

## 2020-01-24 PROCEDURE — 97140 MANUAL THERAPY 1/> REGIONS: CPT | Performed by: OCCUPATIONAL THERAPIST

## 2020-01-24 PROCEDURE — 97110 THERAPEUTIC EXERCISES: CPT | Performed by: OCCUPATIONAL THERAPIST

## 2020-01-24 NOTE — PROGRESS NOTES
OCCUPATIONAL THERAPY PROGRESS NOTE    Date:  2020  Initial Evaluation Date: 19    Patient Name:  Paco Rosenbaum. :  1950    Restrictions/Precautions: Activity as tolerated,  fall risk  Diagnosis:  Contusion of L hand      Insurance/Certification information: - # [id number]  Referring Physician:  Mandi Doshi PA-C  Date of Surgery/Injury: 10/8/19  Plan of care signed (Y/N): N  Visit# / total visits:  (C-9 19 TO 2/15/20)- date is extended    Pain Level: 7-8 on scale of 1-10, throbbing    Subjective: \" I still can't bend my wrist back. It hurts\". Objective:  Updated POC to be completed by 10th visit    INTERVENTION: COMPLETED: SPECIFICS/COMMENTS:   Modality:     Fluidotherapy 10 mins 100% for preconditioning and desensitization. TENS with wrist / forearm AROM as tolerated using at home  tolerated fair+ to help decrease pain from the hand to the proximal forearm   Post Tx ice 10 min    US x wrist/ forearm  Completed post activity to minimize swelling and pain. AROM:     Wrist and hand  x All planes (Limited wrist extension/ opposition is better- Increased discomfort reported with full digit extension   Prehension ex w/ xsmall marbles  Completed with alternating opposition/ intermittent rests needed   Exercise balls x    wristciser 4x More painful today   Towel task x 15 reps        PROM/Stretching:     Gentle PROM  x All planes of wrist and forearm- increased focus on wrist ROM   Prayer stretch x    Scar Mass/Edema Control:     Retrograde Massage/ Soft tissue mob x Decrease edema in LUE/ ease muscle tightness in forearm        Strengthening:     Wrist PREs 1# 1-20 Flexion/ ext and deviation   Putty ex x Gripping x 20, rolling with finger/ wrist extension 2 min   Other: HEP     Wall slides/ hand-wrist-forearm AROM x           Assessment/Comments: Pt is making Fair+ progress toward stated plan of care. ROM and swelling is getting better.  However, pain levels remain higher than expected. Icing started post Tx with help in reduction of discomfort.     -Rehab Potential: Good  -Requires OT Follow Up: Yes  Time In: 1000          Time Out: 1110         Visit #: 8    Treatment Charges: Mins Units Time in Time out   Modalities Fluido 10 1 10:00 10:10   Ther Exercise 15 1 10:25 10:40   Manual Therapy 15 1 10:10 10:25   Thera Activities 20 1 10:40 11:00   ADL/Home Mgt        Neuro Re-education       Group Therapy       Non-Billable Service Time: ice 10 0 11:00 11:10   Other: US       Total Time/Units 70 4          Goals: Goals for pt can be see on initial eval occurring on 12/18/19    Plan:   [x]  Continue Plan of care: Pt education continues at each visit to obtain maximum benefits from skilled OT intervention.   []  [address] of care:   []  Discharge:      Saúl Cevallos OT/L  [id number]

## 2020-01-29 ENCOUNTER — TREATMENT (OUTPATIENT)
Dept: OCCUPATIONAL THERAPY | Age: 70
End: 2020-01-29
Payer: COMMERCIAL

## 2020-01-29 PROCEDURE — 97110 THERAPEUTIC EXERCISES: CPT | Performed by: OCCUPATIONAL THERAPIST

## 2020-01-29 PROCEDURE — 97140 MANUAL THERAPY 1/> REGIONS: CPT | Performed by: OCCUPATIONAL THERAPIST

## 2020-01-29 PROCEDURE — 97022 WHIRLPOOL THERAPY: CPT | Performed by: OCCUPATIONAL THERAPIST

## 2020-01-29 NOTE — PROGRESS NOTES
OCCUPATIONAL THERAPY PROGRESS NOTE    Date:  2020  Initial Evaluation Date: 19    Patient Name:  Verlean Leventhal. :  1950    Restrictions/Precautions: Activity as tolerated,  fall risk  Diagnosis:  Contusion of L hand      Insurance/Certification information: - # [id number]  Referring Physician:  Catrachita Mccrary PA-C  Date of Surgery/Injury: 10/8/19  Plan of care signed (Y/N): N  Visit# / total visits:  (C-9 19 TO 2/15/20)- date is extended    Pain Level: 8 on scale of 1-10, throbbing, nicolás, sensitive in the left hand/ wrist    Subjective: \" My arm hurts real bad at night. It seems like might time is always the worst\". Objective:  Updated POC to be completed by 10th visit    INTERVENTION: COMPLETED: SPECIFICS/COMMENTS:   Modality:     Fluidotherapy 10 mins 100% for preconditioning and desensitization. TENS with wrist / forearm AROM as tolerated using at home  tolerated fair+ to help decrease pain from the hand to the proximal forearm   Post Tx ice     US x wrist/ forearm  Completed post activity to minimize swelling and pain. AROM:     Wrist and hand  x All planes (Limited wrist extension/ opposition is better   Prehension ex w/ xsmall marbles  Completed with alternating opposition   Exercise balls     wristciser 4x Increased compensation due to dorsal and volar wrist pain   Towel task x 15 reps        PROM/Stretching:     Gentle PROM  x All planes of wrist and forearm- increased focus on wrist ROM   Prayer stretch x    Scar Mass/Edema Control:     Retrograde Massage/ Soft tissue mob x Decrease edema in LUE/ ease muscle tightness in forearm        Strengthening:     Wrist PREs 1# 1-20 Flexion/ ext and deviation   Putty ex x Gripping x 20, rolling with finger/ wrist extension 2 min   Other: HEP     Wall slides/ hand-wrist-forearm AROM x           Assessment/Comments: Pt is making Fair+ progress toward stated plan of care.  Pt continues to guard the left wrist/ hand from moderate to heavy use. Pain remains and has shown minimal progress. Session shortened today due to pt having another appointment to get to.     -Rehab Potential: Good  -Requires OT Follow Up: Yes  Time In: 1000          Time Out: 1045       Visit #: 9    Treatment Charges: Mins Units Time in Time out   Modalities Fluido 10 1 10:00 10:10   Ther Exercise 20 1 10:25 10:45   Manual Therapy 15 1 10:10 10:25   Thera Activities       ADL/Home Mgt        Neuro Re-education       Group Therapy       Non-Billable Service Time: ice       Other: US       Total Time/Units 45 4          Goals: Goals for pt can be see on initial eval occurring on 12/18/19    Plan:   [x]  Continue Plan of care: Pt education continues at each visit to obtain maximum benefits from skilled OT intervention.   []  [facility] of care:   []  Discharge:      Sebas Clayton OT/L  [id number]

## 2020-02-03 ENCOUNTER — TREATMENT (OUTPATIENT)
Dept: OCCUPATIONAL THERAPY | Age: 70
End: 2020-02-03
Payer: COMMERCIAL

## 2020-02-03 PROCEDURE — 97110 THERAPEUTIC EXERCISES: CPT | Performed by: OCCUPATIONAL THERAPIST

## 2020-02-03 PROCEDURE — 97530 THERAPEUTIC ACTIVITIES: CPT | Performed by: OCCUPATIONAL THERAPIST

## 2020-02-03 PROCEDURE — 97140 MANUAL THERAPY 1/> REGIONS: CPT | Performed by: OCCUPATIONAL THERAPIST

## 2020-02-03 PROCEDURE — 97022 WHIRLPOOL THERAPY: CPT | Performed by: OCCUPATIONAL THERAPIST

## 2020-02-10 ENCOUNTER — TREATMENT (OUTPATIENT)
Dept: OCCUPATIONAL THERAPY | Age: 70
End: 2020-02-10
Payer: COMMERCIAL

## 2020-02-10 PROCEDURE — 97022 WHIRLPOOL THERAPY: CPT | Performed by: OCCUPATIONAL THERAPIST

## 2020-02-10 PROCEDURE — 97110 THERAPEUTIC EXERCISES: CPT | Performed by: OCCUPATIONAL THERAPIST

## 2020-02-10 PROCEDURE — 97530 THERAPEUTIC ACTIVITIES: CPT | Performed by: OCCUPATIONAL THERAPIST

## 2020-02-10 PROCEDURE — 97140 MANUAL THERAPY 1/> REGIONS: CPT | Performed by: OCCUPATIONAL THERAPIST

## 2020-02-10 NOTE — PROGRESS NOTES
OCCUPATIONAL THERAPY PROGRESS NOTE    Date:  2/10/2020  Initial Evaluation Date: 19    Patient Name:  Parvez Altman. :  1950    Restrictions/Precautions: Activity as tolerated,  fall risk  Diagnosis:  Contusion of L hand      Insurance/Certification information: - # [id number]  Referring Physician:  Kim Alvarado PA-C  Date of Surgery/Injury: 10/8/19  Plan of care signed (Y/N): N  Visit# / total visits:  (C-9 19 TO 2/15/20)- date is extended    Pain Level: 6-7 on scale of 1-10, sore, tender in the left hand/ wrist    Subjective: Pt presents with no new complaints. Objective:  Updated POC to be completed by 11th visit    INTERVENTION: COMPLETED: SPECIFICS/COMMENTS:   Modality:     Fluidotherapy 10 mins 100% for preconditioning and desensitization. TENS with wrist / forearm AROM as tolerated using at home  tolerated fair+ to help decrease pain from the hand to the proximal forearm   Post Tx ice     US x wrist/ forearm  Completed post activity to minimize swelling and pain. AROM:     Wrist and hand  x All planes (Limited wrist extension/ opposition is better        Exercise balls     wristciser 4x Getting better but still painful in the wrist   Towel task  15 reps        PROM/Stretching:     Gentle PROM  x All planes of wrist and forearm- increased focus on wrist ROM   Prayer stretch     Scar Mass/Edema Control:     Retrograde Massage/ Soft tissue mob x Decrease edema in LUE/ ease muscle tightness in forearm        Strengthening:     Wrist PREs 1# 1-20 Flexion/ ext and deviation   La bar/ beige 10x Bending and twisting in/ out   Weighted stick 1# 1-10 Forearm rotation and deviations   Putty ex- soft x Gripping x 20, rolling with finger/ wrist extension 2 min   Other: HEP     Wall slides/ hand-wrist-forearm AROM x           Assessment/Comments: Pt is making Fair+ progress toward stated plan of care. Pt is scheduled for return to work/ light duty next week.  Wrist ROM

## 2020-03-03 ENCOUNTER — TREATMENT (OUTPATIENT)
Dept: OCCUPATIONAL THERAPY | Age: 70
End: 2020-03-03
Payer: COMMERCIAL

## 2020-03-03 PROCEDURE — 97140 MANUAL THERAPY 1/> REGIONS: CPT | Performed by: OCCUPATIONAL THERAPIST

## 2020-03-03 PROCEDURE — 97110 THERAPEUTIC EXERCISES: CPT | Performed by: OCCUPATIONAL THERAPIST

## 2020-03-03 PROCEDURE — 97530 THERAPEUTIC ACTIVITIES: CPT | Performed by: OCCUPATIONAL THERAPIST

## 2020-03-03 PROCEDURE — 97022 WHIRLPOOL THERAPY: CPT | Performed by: OCCUPATIONAL THERAPIST

## 2020-03-05 ENCOUNTER — TREATMENT (OUTPATIENT)
Dept: OCCUPATIONAL THERAPY | Age: 70
End: 2020-03-05
Payer: COMMERCIAL

## 2020-03-05 PROCEDURE — 97530 THERAPEUTIC ACTIVITIES: CPT | Performed by: OCCUPATIONAL THERAPIST

## 2020-03-05 PROCEDURE — 97140 MANUAL THERAPY 1/> REGIONS: CPT | Performed by: OCCUPATIONAL THERAPIST

## 2020-03-05 PROCEDURE — 97110 THERAPEUTIC EXERCISES: CPT | Performed by: OCCUPATIONAL THERAPIST

## 2020-03-05 PROCEDURE — 97022 WHIRLPOOL THERAPY: CPT | Performed by: OCCUPATIONAL THERAPIST

## 2020-03-05 NOTE — PROGRESS NOTES
activity but it is painful. Swelling is decreased. -Rehab Potential: Good  -Requires OT Follow Up: Yes  Time In: 0800          Time Out: 0900      Visit #: 2    Treatment Charges: Mins Units Time in Time out   Modalities Fluido 10 1 0800 0810   Ther Exercise 15 1 0825 0840   Manual Therapy 15 1 0810 0825   Thera Activities 20 1 0840 0900   ADL/Home Mgt        Neuro Re-education       Group Therapy       Non-Billable Service Time: ice       Other: US       Total Time/Units 60 4          Goals: Goals for pt can be see on initial eval occurring on 12/18/19    Plan:   [x]  Continue Plan of care: Continue therapy 2x a week for 4 weeks.    []  [address] of care:   []  Discharge:      Erma Montero OT/L  [id number]

## 2020-03-09 ENCOUNTER — TREATMENT (OUTPATIENT)
Dept: OCCUPATIONAL THERAPY | Age: 70
End: 2020-03-09
Payer: COMMERCIAL

## 2020-03-09 PROCEDURE — 97530 THERAPEUTIC ACTIVITIES: CPT | Performed by: OCCUPATIONAL THERAPIST

## 2020-03-09 PROCEDURE — 97110 THERAPEUTIC EXERCISES: CPT | Performed by: OCCUPATIONAL THERAPIST

## 2020-03-09 PROCEDURE — 97140 MANUAL THERAPY 1/> REGIONS: CPT | Performed by: OCCUPATIONAL THERAPIST

## 2020-03-09 PROCEDURE — 97022 WHIRLPOOL THERAPY: CPT | Performed by: OCCUPATIONAL THERAPIST

## 2020-03-12 ENCOUNTER — TREATMENT (OUTPATIENT)
Dept: OCCUPATIONAL THERAPY | Age: 70
End: 2020-03-12
Payer: COMMERCIAL

## 2020-03-12 PROCEDURE — 97110 THERAPEUTIC EXERCISES: CPT | Performed by: OCCUPATIONAL THERAPIST

## 2020-03-12 PROCEDURE — 97140 MANUAL THERAPY 1/> REGIONS: CPT | Performed by: OCCUPATIONAL THERAPIST

## 2020-03-12 PROCEDURE — 97022 WHIRLPOOL THERAPY: CPT | Performed by: OCCUPATIONAL THERAPIST

## 2020-03-18 ENCOUNTER — TREATMENT (OUTPATIENT)
Dept: OCCUPATIONAL THERAPY | Age: 70
End: 2020-03-18
Payer: COMMERCIAL

## 2020-03-18 PROCEDURE — 97110 THERAPEUTIC EXERCISES: CPT | Performed by: OCCUPATIONAL THERAPIST

## 2020-03-18 PROCEDURE — 97022 WHIRLPOOL THERAPY: CPT | Performed by: OCCUPATIONAL THERAPIST

## 2020-03-18 PROCEDURE — 97035 APP MDLTY 1+ULTRASOUND EA 15: CPT | Performed by: OCCUPATIONAL THERAPIST

## 2020-03-18 PROCEDURE — 97140 MANUAL THERAPY 1/> REGIONS: CPT | Performed by: OCCUPATIONAL THERAPIST

## 2020-03-18 NOTE — PROGRESS NOTES
Therapy 15 1 1510 11 [facility]       ADL/Home Mgt        Neuro Re-education       Group Therapy       Non-Billable Service Time: ice       Other:  12 6 1234 8911   Total Time/Units 55 4          Goals: Goals for pt can be see on initial eval occurring on 12/18/19    Plan:   [x]  Continue Plan of care: Continue therapy 2x a week for 4 weeks.    []  [address] of care:   []  Discharge:      Nik Rivas OT/L  [id number]

## 2020-03-20 ENCOUNTER — TREATMENT (OUTPATIENT)
Dept: OCCUPATIONAL THERAPY | Age: 70
End: 2020-03-20
Payer: COMMERCIAL

## 2020-03-20 PROCEDURE — 97022 WHIRLPOOL THERAPY: CPT | Performed by: OCCUPATIONAL THERAPIST

## 2020-03-20 PROCEDURE — 97530 THERAPEUTIC ACTIVITIES: CPT | Performed by: OCCUPATIONAL THERAPIST

## 2020-03-20 PROCEDURE — 97110 THERAPEUTIC EXERCISES: CPT | Performed by: OCCUPATIONAL THERAPIST

## 2020-03-20 PROCEDURE — 97140 MANUAL THERAPY 1/> REGIONS: CPT | Performed by: OCCUPATIONAL THERAPIST

## 2020-03-20 NOTE — PROGRESS NOTES
Manual Therapy 15 1 0900 0991   Thera Activities 10 1 1000 1010   ADL/Home Mgt        Neuro Re-education       Group Therapy       Non-Billable Service Time: ice       Other: US 5 0 2001 8122   Total Time/Units 60 4          Goals: Goals for pt can be see on initial eval occurring on 12/18/19    Plan:   []  Continue Plan of care:   [x]  [address] of care: Place patient on hold due to services not available because of COVID-19.  []  Discharge:      Nathan Azar Rising PATEL/L [id number]

## 2020-06-01 ENCOUNTER — OFFICE VISIT (OUTPATIENT)
Dept: ORTHOPEDIC SURGERY | Age: 70
End: 2020-06-01
Payer: COMMERCIAL

## 2020-06-01 VITALS — WEIGHT: 168 LBS | TEMPERATURE: 97.8 F | BODY MASS INDEX: 24.88 KG/M2 | HEIGHT: 69 IN

## 2020-06-01 PROCEDURE — 99204 OFFICE O/P NEW MOD 45 MIN: CPT | Performed by: ORTHOPAEDIC SURGERY

## 2020-06-01 RX ORDER — PANTOPRAZOLE SODIUM 40 MG/1
TABLET, DELAYED RELEASE ORAL DAILY PRN
COMMUNITY
Start: 2020-05-17

## 2020-06-01 RX ORDER — SIMVASTATIN 20 MG
TABLET ORAL DAILY
COMMUNITY
Start: 2020-03-30
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
## 2020-07-09 ENCOUNTER — OFFICE VISIT (OUTPATIENT)
Dept: ORTHOPEDIC SURGERY | Age: 70
End: 2020-07-09
Payer: COMMERCIAL

## 2020-07-09 VITALS — WEIGHT: 165 LBS | BODY MASS INDEX: 24.44 KG/M2 | TEMPERATURE: 98.4 F | HEIGHT: 69 IN | RESPIRATION RATE: 20 BRPM

## 2020-07-09 PROCEDURE — 20550 NJX 1 TENDON SHEATH/LIGAMENT: CPT | Performed by: ORTHOPAEDIC SURGERY

## 2020-07-09 PROCEDURE — 99213 OFFICE O/P EST LOW 20 MIN: CPT | Performed by: ORTHOPAEDIC SURGERY

## 2020-07-09 RX ORDER — BETAMETHASONE SODIUM PHOSPHATE AND BETAMETHASONE ACETATE 3; 3 MG/ML; MG/ML
6 INJECTION, SUSPENSION INTRA-ARTICULAR; INTRALESIONAL; INTRAMUSCULAR; SOFT TISSUE ONCE
Status: COMPLETED | OUTPATIENT
Start: 2020-07-09 | End: 2020-07-09

## 2020-07-09 RX ADMIN — BETAMETHASONE SODIUM PHOSPHATE AND BETAMETHASONE ACETATE 6 MG: 3; 3 INJECTION, SUSPENSION INTRA-ARTICULAR; INTRALESIONAL; INTRAMUSCULAR; SOFT TISSUE at 10:53
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
## 2020-07-13 ENCOUNTER — EVALUATION (OUTPATIENT)
Dept: OCCUPATIONAL THERAPY | Age: 70
End: 2020-07-13
Payer: COMMERCIAL

## 2020-07-13 PROCEDURE — 97165 OT EVAL LOW COMPLEX 30 MIN: CPT | Performed by: OCCUPATIONAL THERAPIST
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
## 2020-07-15 ENCOUNTER — TREATMENT (OUTPATIENT)
Dept: OCCUPATIONAL THERAPY | Age: 70
End: 2020-07-15
Payer: COMMERCIAL

## 2020-07-15 PROCEDURE — 97018 PARAFFIN BATH THERAPY: CPT | Performed by: OCCUPATIONAL THERAPIST

## 2020-07-15 PROCEDURE — 97140 MANUAL THERAPY 1/> REGIONS: CPT | Performed by: OCCUPATIONAL THERAPIST

## 2020-07-15 PROCEDURE — 97530 THERAPEUTIC ACTIVITIES: CPT | Performed by: OCCUPATIONAL THERAPIST

## 2020-07-15 PROCEDURE — 97110 THERAPEUTIC EXERCISES: CPT | Performed by: OCCUPATIONAL THERAPIST

## 2020-07-15 NOTE — PROGRESS NOTES
OCCUPATIONAL THERAPY PROGRESS NOTE    Date:  7/15/2020  Initial Evaluation Date: 20    Patient Name:  Alejandro Holguin. :  1950  Restrictions/Precautions: Activity as tolerated, Low fall risk  Diagnosis:  Contusion L hand (E40.734K)                                                     Insurance/Certification information:  [facility] #[id number]  Referring Physician:  Dr Mady Sahu  Date of Surgery/Injury: injection 20  Plan of care signed (Y/N):  N  Visit# / total visits: 2 / up to 18 (C9 20 to 20)    Pain Level: 8 on scale of 1-10, aching, sharp, throbbing and uncomfortable in the left hand/ wrist    Subjective: \" This still hurts alot. \"     Objective:  Updated POC to be completed by visit 10. INTERVENTION: COMPLETED: SPECIFICS/COMMENTS:   Modality:     Paraffin Tx x X 10 min- completed as preconditioning prior to exercise        AROM/ AAROM     Thumb AROM/ AAROM x All planes as tolerated   Wrist AROM x All planes as tolerated- extension is the most painful   Exercise balls x Very difficult d/t stiffness and pain   Tabletop blue ball ex x Roll into flexion/ ext- rolling into slight supination/ pronation        PROM/Stretching:     Gentle PROM digits/ wrist x Completed as tolerated        Manual techniques:     Cross friction along thumb extensor tendon x    Soft tissue mob x wrist x Very sensitive in the volar , radial wrist   Strengthening:               Other:                 Assessment/Comments: Pt is making Fair progress toward stated plan of care. Tolerance for movement is low due to pain levels. Activity completed as pt could tolerate. Pt observed to still use wrist wrap. When questioned about home exercises, the response was vague. Will continue as tolerated.      -Rehab Potential: Good  -Requires OT Follow Up: Yes  Time In:1400            Time Out: 1500             Visit #: 2      Treatment Charges: Mins Time in - Time out  Units   Modalities: Paraffin 10 2285-5126 1   Ther Exercise 15 1425- 1440 1   Manual Therapy 15 7010-2839 1   Thera Activities 20 1440- 1500 1   ADL/Home Mgt       Neuro Re-education      Gait Training      Group Therapy      Non-Billable Service Time      Other      Total Time/Units 60  4       -Response to Treatment: Increased pain reported with all ROM ex. Pt is concerned about this persistent pain and feels the botox barely helped. Will continue. Goals: Goals for pt can be seen on initial eval occurring on 7-13-20    Plan:   [x]  Continue Plan of care: Pt education continues at each visit to obtain maximum benefits from skilled OT intervention.   []  [address] [facility] of care:   []  Discharge:      Thedford Kocher OT/L  [id number]

## 2020-07-20 ENCOUNTER — TREATMENT (OUTPATIENT)
Dept: OCCUPATIONAL THERAPY | Age: 70
End: 2020-07-20
Payer: COMMERCIAL

## 2020-07-20 PROCEDURE — 97140 MANUAL THERAPY 1/> REGIONS: CPT | Performed by: OCCUPATIONAL THERAPIST

## 2020-07-20 PROCEDURE — 97110 THERAPEUTIC EXERCISES: CPT | Performed by: OCCUPATIONAL THERAPIST

## 2020-07-20 PROCEDURE — 97530 THERAPEUTIC ACTIVITIES: CPT | Performed by: OCCUPATIONAL THERAPIST

## 2020-07-20 PROCEDURE — 97018 PARAFFIN BATH THERAPY: CPT | Performed by: OCCUPATIONAL THERAPIST

## 2020-07-20 NOTE — PROGRESS NOTES
OCCUPATIONAL THERAPY PROGRESS NOTE    Date:  2020  Initial Evaluation Date: 20    Patient Name:  Danny Villasenor. :  1950  Restrictions/Precautions: Activity as tolerated, Low fall risk  Diagnosis:  Contusion L hand (M50.332K)                                                     Insurance/Certification information:  [facility] #[id number]  Referring Physician:  Dr Catalina Mcgraw  Date of Surgery/Injury: injection 20  Plan of care signed (Y/N):  N  Visit# / total visits: 3 / up to 18 (C9 20 to 20)    Pain Level: 8 on scale of 1-10, aching, sharp, throbbing and uncomfortable in the left hand/ wrist    Subjective: \" I still don't know why I can't get my wrist to go back. It just hurts\". Objective:  Updated POC to be completed by visit 10. INTERVENTION: COMPLETED: SPECIFICS/COMMENTS:   Modality:     Paraffin Tx x X 10 min- completed as preconditioning prior to exercise        AROM/ AAROM     Thumb AROM/ AAROM x All planes as tolerated   Wrist AROM x All planes as tolerated- extension is the most painful   Exercise balls x Very difficult d/t stiffness and pain   Tabletop blue ball ex x Roll into flexion/ ext- rolling into slight supination/ pronation (poor tolerance for supination)   Thumb slides x Completed for each digit   wristciser 2x Challenging/ increased compensation and pain   [place] in hand manipulation 3x Completed w/ quarters focus on thumb ROM   Towel exercise 10x Tolerated well   PROM/Stretching:     Gentle PROM digits/ wrist x Completed as tolerated        Manual techniques:     Cross friction along thumb extensor tendon x    Soft tissue mob x wrist x Very sensitive in the volar , radial wrist   Strengthening:               Other:                 Assessment/Comments: Pt is making Fair progress toward stated plan of care. Tolerance for movement is better today for the thumb. All wrist ROM ex are more painful and tight.  Pt is very guarded of active supination, will continue as tolerated. -Rehab Potential: Good  -Requires OT Follow Up: Yes  Time In:1400            Time Out: 0506             Visit #: 3      Treatment Charges: Mins Time in - Time out  Units   Modalities: Paraffin 10 2435-3821 1   Ther Exercise 15 1425- 1440 1   Manual Therapy 15 4703-3340 1   Thera Activities 15 1440- 1455 1   ADL/Home Mgt       Neuro Re-education      Gait Training      Group Therapy      Non-Billable Service Time      Other      Total Time/Units 55  4       -Response to Treatment: Pt is discouraged by the pain. He states he uses the wrist/ hand at home but it painful. Goals: Goals for pt can be seen on initial eval occurring on 7-13-20    Plan:   [x]  Continue Plan of care: Pt education continues at each visit to obtain maximum benefits from skilled OT intervention.   []  [facility] of care:   []  Discharge:      Millie Arshad OT/L  [id number]

## 2020-07-23 ENCOUNTER — TREATMENT (OUTPATIENT)
Dept: OCCUPATIONAL THERAPY | Age: 70
End: 2020-07-23
Payer: COMMERCIAL

## 2020-07-23 PROCEDURE — 97110 THERAPEUTIC EXERCISES: CPT | Performed by: OCCUPATIONAL THERAPIST

## 2020-07-23 PROCEDURE — 97018 PARAFFIN BATH THERAPY: CPT | Performed by: OCCUPATIONAL THERAPIST

## 2020-07-23 PROCEDURE — 97530 THERAPEUTIC ACTIVITIES: CPT | Performed by: OCCUPATIONAL THERAPIST

## 2020-07-23 PROCEDURE — 97140 MANUAL THERAPY 1/> REGIONS: CPT | Performed by: OCCUPATIONAL THERAPIST

## 2020-07-27 ENCOUNTER — TREATMENT (OUTPATIENT)
Dept: OCCUPATIONAL THERAPY | Age: 70
End: 2020-07-27
Payer: COMMERCIAL

## 2020-07-27 PROCEDURE — 97530 THERAPEUTIC ACTIVITIES: CPT | Performed by: OCCUPATIONAL THERAPIST

## 2020-07-27 PROCEDURE — 97110 THERAPEUTIC EXERCISES: CPT | Performed by: OCCUPATIONAL THERAPIST

## 2020-07-27 PROCEDURE — 97018 PARAFFIN BATH THERAPY: CPT | Performed by: OCCUPATIONAL THERAPIST

## 2020-07-27 NOTE — PROGRESS NOTES
OCCUPATIONAL THERAPY PROGRESS NOTE    Date:  2020  Initial Evaluation Date: 20    Patient Name:  Oksana West. :  1950  Restrictions/Precautions: Activity as tolerated, Low fall risk  Diagnosis:  Contusion L hand (P84.453R)                                                     Insurance/Certification information:  [facility] #[id number]  Referring Physician:  Dr Sonia Schulte  Date of Surgery/Injury: injection 20  Plan of care signed (Y/N):  N  Visit# / total visits: 5 / up to 18 (C9 20 to 20)    Pain Level: 8 on scale of 1-10, aching, sharp, throbbing and uncomfortable in the left hand/ wrist    Subjective: \" I am really getting tired of this hurting all the time. It has been too long. \"  Objective:  Updated POC to be completed by visit 10. INTERVENTION: COMPLETED: SPECIFICS/COMMENTS:   Modality:     Paraffin Tx x X 10 min- completed as preconditioning prior to exercise   US x wrist to mid forearm x X 5 min/ tolerated well post activity   AROM/ AAROM     Thumb AROM/ AAROM x All planes as tolerated   Wrist AROM x All planes as tolerated   Exercise balls     Tabletop blue ball ex x Roll into flexion/ ext- rolling into slight supination/ pronation (fair- tolerance for supination)   Thumb slides  Completed for each digit- hard   wristciser x Challenging/ decreasing compensation and pain   [place] in hand manipulation x Completed w/ pennies focus on thumb ROM   Towel exercise  Tolerated well   PROM/Stretching:     Gentle PROM digits/ wrist x Completed as tolerated        Manual techniques:     Cross friction along thumb extensor tendon x    Soft tissue mob x wrist x Very sensitive in the volar , radial wrist   Strengthening:               Other:                 Assessment/Comments: Pt is making Fair progress toward stated plan of care. Pain continues. Tolerance for movement is a little better today for the thumb and the wrist. All movement is still painful.  Pt may benefit from a hand based [facility] support. Will monitor.     -Rehab Potential: Good  -Requires OT Follow Up: Yes  Time In:1430            Time Out: 5762             Visit #: 5      Treatment Charges: Mins Time in - Time out  Units   Modalities: Paraffin 10 5339-8693 1   Ther Exercise 15 1445- 1500 1   Manual Therapy 5 3236-4573 0   Thera Activities 15 1500- 1515 1   ADL/Home Mgt       Neuro Re-education      Gait Training      Group Therapy      Non-Billable Service Time      Other: US      Total Time/Units 45  3       -Response to Treatment: Pt is discouraged by the pain. Support provided. Goals: Goals for pt can be seen on initial eval occurring on 7-13-20    Plan:   [x]  Continue Plan of care: Pt education continues at each visit to obtain maximum benefits from skilled OT intervention.   []  [address] of care:   []  Discharge:      Willie Esqueda OT/L  [id number]

## 2020-07-30 ENCOUNTER — TREATMENT (OUTPATIENT)
Dept: OCCUPATIONAL THERAPY | Age: 70
End: 2020-07-30
Payer: COMMERCIAL

## 2020-07-30 PROCEDURE — 97018 PARAFFIN BATH THERAPY: CPT | Performed by: OCCUPATIONAL THERAPIST

## 2020-07-30 PROCEDURE — 97110 THERAPEUTIC EXERCISES: CPT | Performed by: OCCUPATIONAL THERAPIST

## 2020-07-30 PROCEDURE — 97760 ORTHOTIC MGMT&TRAING 1ST ENC: CPT | Performed by: OCCUPATIONAL THERAPIST

## 2020-07-30 NOTE — PROGRESS NOTES
Pain continues. Tolerance for movement is decreased today for the wrist. Hand based [facility] splint fabricated today for pt to use as needed with moderate to heavy activity only. Progress is very slow. Status has not changed much since his injection.     -Rehab Potential: Good  -Requires OT Follow Up: Yes  Time In:1400            Time Out: 1500            Visit #: 6      Treatment Charges: Mins Time in - Time out  Units   Modalities: Paraffin 10 1400- 1410 1   Ther Exercise 15 1415- 1430 1   Manual Therapy 5 9486-1401 0   Thera Activities      ADL/Home Mgt       Neuro Re-education      Gait Training      Group Therapy      Non-Billable Service Time      Other: Ortho fit 30 1430- 1500 2   Total Time/Units 60  4       -Response to Treatment: Pt is discouraged by the pain. Support provided. Goals: Goals for pt can be seen on initial eval occurring on 7-13-20    Plan:   [x]  Continue Plan of care: Pt education continues at each visit to obtain maximum benefits from skilled OT intervention.   []  [address] of care:   []  Discharge:      Heidi Joshi OT/L  [id number]

## 2020-08-04 ENCOUNTER — TREATMENT (OUTPATIENT)
Dept: OCCUPATIONAL THERAPY | Age: 70
End: 2020-08-04
Payer: COMMERCIAL

## 2020-08-04 PROCEDURE — 97530 THERAPEUTIC ACTIVITIES: CPT | Performed by: OCCUPATIONAL THERAPY ASSISTANT

## 2020-08-04 PROCEDURE — 97018 PARAFFIN BATH THERAPY: CPT | Performed by: OCCUPATIONAL THERAPY ASSISTANT

## 2020-08-04 PROCEDURE — 97110 THERAPEUTIC EXERCISES: CPT | Performed by: OCCUPATIONAL THERAPY ASSISTANT

## 2020-08-04 PROCEDURE — 97140 MANUAL THERAPY 1/> REGIONS: CPT | Performed by: OCCUPATIONAL THERAPY ASSISTANT

## 2020-08-04 NOTE — PROGRESS NOTES
stated plan of care. Pain continues. Tolerance for movement is decreased today for the wrist. Hand splint checked, no issues. Progress is very slow due to increased pain.     -Rehab Potential: Good  -Requires OT Follow Up: Yes  Time In:1500            Time Out: 4019           Visit #:7      Treatment Charges: Mins Time in - Time out  Units   Modalities: Paraffin 10 7358-6982 1   Ther Exercise 20 9137-6340 1   Manual Therapy 15 4780-2438 1   Thera Activities 10 8227-8596 1   ADL/Home Mgt       Neuro Re-education      Gait Training      Group Therapy      Non-Billable Service Time      Other: Ortho fit      Total Time/Units 60  4       -Response to Treatment: Pt is discouraged by the pain. Support provided. Goals: Goals for pt can be seen on initial eval occurring on 7-13-20    Plan:   [x]  Continue Plan of care: Pt education continues at each visit to obtain maximum benefits from skilled OT intervention.   []  [address] of care:   []  Discharge:      Kip PATEL/JOELLE [id number]

## 2020-08-06 ENCOUNTER — TREATMENT (OUTPATIENT)
Dept: OCCUPATIONAL THERAPY | Age: 70
End: 2020-08-06
Payer: COMMERCIAL

## 2020-08-06 PROCEDURE — 97018 PARAFFIN BATH THERAPY: CPT | Performed by: OCCUPATIONAL THERAPY ASSISTANT

## 2020-08-06 PROCEDURE — 97110 THERAPEUTIC EXERCISES: CPT | Performed by: OCCUPATIONAL THERAPY ASSISTANT

## 2020-08-06 PROCEDURE — 97140 MANUAL THERAPY 1/> REGIONS: CPT | Performed by: OCCUPATIONAL THERAPY ASSISTANT

## 2020-08-06 NOTE — PROGRESS NOTES
[facility]    OCCUPATIONAL THERAPY PROGRESS NOTE    Date:  2020  Initial Evaluation Date: 20    Patient Name:  Damon Rockwell. :  1950  Restrictions/Precautions: Activity as tolerated, Low fall risk  Diagnosis:  Contusion L hand (G14.576I)                                                     Insurance/Certification information:  [facility] #[id number]  Referring Physician:  Dr Luis M Alcocer  Date of Surgery/Injury: injection 20  Plan of care signed (Y/N):  N  Visit# / total visits: 8 / up to 18 (C9 20 to 20)    Pain Level: 8 on scale of 1-10, aching, sharp, throbbing and uncomfortable in the left hand/ wrist    Subjective: \"I try to bend it, but I get so far and it just stops. \"    Objective:  Updated POC to be completed by visit 10. INTERVENTION: COMPLETED: SPECIFICS/COMMENTS:   Modality:     Paraffin Tx x X 10 min- completed as preconditioning prior to exercise   US x wrist to mid forearm x X 5 min/ tolerated well post activity   AROM/ AAROM     Thumb AROM/ AAROM x All planes as tolerated   Wrist AROM x All planes as tolerated   Exercise balls     Tabletop blue ball ex x Roll into flexion/ ext- rolling into slight supination/ pronation (fair- tolerance for supination)   Thumb slides  Completed for each digit- hard   wristciser  Challenging/ decreasing compensation and pain   [place] in hand manipulation  Completed w/ pennies focus on thumb ROM   Towel exercise  Tolerated well   PROM/Stretching:     Gentle PROM digits/ wrist x Completed as tolerated- 9/10 pain in the wrist        Manual techniques:     Cross friction along thumb extensor tendon x    Soft tissue mob x wrist x Very sensitive in the volar , radial wrist   Strengthening:               Other:     Ortho fit x Hand based thumb splint fabricated. [place] provided for added comfort under the splint.            Assessment/Comments: Pt is making Fair -progress toward stated plan of care. Pain continues. Tolerance for movement is decreased today for the wrist. Hand splint checked, no issues. Progress is very slow due to increased pain.     -Rehab Potential: Good  -Requires OT Follow Up: Yes  Time In:1300            Time Out: 1340          Visit #:8      Treatment Charges: Mins Time in - Time out  Units   Modalities: Paraffin 10/5 [address] 1   Ther Exercise 10 2360-3006 1   Manual Therapy 15 2186-9177 1   Thera Activities      ADL/Home Mgt       Neuro Re-education      Gait Training      Group Therapy      Non-Billable Service Time      Other: Ortho fit      Total Time/Units 40  4       -Response to Treatment: Pt is discouraged by the pain. Support provided. Goals: Goals for pt can be seen on initial eval occurring on 7-13-20    Plan:   [x]  Continue Plan of care: Pt education continues at each visit to obtain maximum benefits from skilled OT intervention.   []  [address] of care:   []  Discharge:      Trice PATEL/JOELLE [id number]

## 2020-08-13 ENCOUNTER — TREATMENT (OUTPATIENT)
Dept: OCCUPATIONAL THERAPY | Age: 70
End: 2020-08-13
Payer: COMMERCIAL

## 2020-08-13 PROCEDURE — 97140 MANUAL THERAPY 1/> REGIONS: CPT | Performed by: OCCUPATIONAL THERAPY ASSISTANT

## 2020-08-13 PROCEDURE — 97110 THERAPEUTIC EXERCISES: CPT | Performed by: OCCUPATIONAL THERAPY ASSISTANT

## 2020-08-13 PROCEDURE — 97018 PARAFFIN BATH THERAPY: CPT | Performed by: OCCUPATIONAL THERAPY ASSISTANT

## 2020-08-13 NOTE — PROGRESS NOTES
[facility]    OCCUPATIONAL THERAPY PROGRESS NOTE    Date:  2020  Initial Evaluation Date: 20    Patient Name:  Hansa De León :  1950  Restrictions/Precautions: Activity as tolerated, Low fall risk  Diagnosis:  Contusion L hand (N52.439G)                                                     Insurance/Certification information:  [facility] #[id number]  Referring Physician:  Dr Pablo Osuna  Date of Surgery/Injury: injection 20  Plan of care signed (Y/N):  N  Visit# / total visits: 9 / up to 18 (C9 20 to 20)    Pain Level: 8 on scale of 1-10, aching, sharp, throbbing and uncomfortable in the left hand/ wrist    Subjective:  \"I saw the workman's comp doctor yesterday, he said we got to wait and see what Dr. Ilan Barlow says\"    Objective:  Updated POC to be completed by visit 10. INTERVENTION: COMPLETED: SPECIFICS/COMMENTS:   Modality:     Paraffin Tx x X 10 min- completed as preconditioning prior to exercise   US x wrist to mid forearm x X 5 min/ tolerated well post activity   AROM/ AAROM     Thumb AROM/ AAROM x All planes as tolerated   Wrist AROM x All planes as tolerated   Exercise balls x    Tabletop blue ball ex  Roll into flexion/ ext- rolling into slight supination/ pronation (fair- tolerance for supination)   Thumb slides  Completed for each digit- hard   wristciser x Challenging/ decreasing compensation and pain   [place] in hand manipulation x Completed w/ pennies focus on thumb ROM   Towel exercise x Tolerated well   PROM/Stretching:     Gentle PROM digits/ wrist x Completed as tolerated- 9/10 pain in the wrist        Manual techniques:     Cross friction along thumb extensor tendon x    Soft tissue mob x wrist x Very sensitive in the volar , radial wrist   Strengthening:               Other:     Ortho fit  Hand based thumb splint fabricated. [place] provided for added comfort under the splint. Assessment/Comments: Pt is making Fair -progress toward stated plan of care. Pain continues at 8/10 level . Pt. Tolerated session at [address] level but is limited with ROM by his pain.     -Rehab Potential: Good  -Requires OT Follow Up: Yes  Time In:1300            Time Out: 4151          Visit #9      Treatment Charges: Mins Time in - Time out  Units   Modalities: Paraffin 10/5 330[address]-0060 1   Ther Exercise 15 3406-5670 1   Manual Therapy 15 5062-0679 1   Thera Activities      ADL/Home Mgt       Neuro Re-education      Gait Training      Group Therapy      Non-Billable Service Time      Other: Ortho fit      Total Time/Units 45  3       -Response to Treatment: Pt is discouraged by the pain. Support provided. Goals: Goals for pt can be seen on initial eval occurring on 7-13-20    Plan:   [x]  Continue Plan of care: Pt education continues at each visit to obtain maximum benefits from skilled OT intervention.   []  [address] of care:   []  Discharge:      Prashanth PATEL/JOELLE [id number]

## 2020-08-14 ENCOUNTER — TREATMENT (OUTPATIENT)
Dept: OCCUPATIONAL THERAPY | Age: 70
End: 2020-08-14
Payer: COMMERCIAL

## 2020-08-14 PROCEDURE — 97110 THERAPEUTIC EXERCISES: CPT | Performed by: OCCUPATIONAL THERAPIST

## 2020-08-14 PROCEDURE — 97140 MANUAL THERAPY 1/> REGIONS: CPT | Performed by: OCCUPATIONAL THERAPIST

## 2020-08-14 PROCEDURE — 97530 THERAPEUTIC ACTIVITIES: CPT | Performed by: OCCUPATIONAL THERAPIST

## 2020-08-14 PROCEDURE — 97018 PARAFFIN BATH THERAPY: CPT | Performed by: OCCUPATIONAL THERAPIST

## 2020-08-14 NOTE — PROGRESS NOTES
[facility]    OCCUPATIONAL THERAPY PROGRESS NOTE    Date:  2020  Initial Evaluation Date: 20    Patient Name:  Skyla Echevarria. :  1950  Restrictions/Precautions: Activity as tolerated, Low fall risk  Diagnosis:  Contusion L hand (P08.303F)                                                     Insurance/Certification information:  [facility] #[id number]  Referring Physician:  Dr Leonardo Azar  Date of Surgery/Injury: injection 20  Plan of care signed (Y/N):  N  Visit# / total visits: 10/ up to 18 (C9 20 to 20)- date extension has been requested    Pain Level: 8 on scale of 1-10, aching, sharp, throbbing and uncomfortable in the left hand/ wrist    Subjective:  \"I saw the workman's comp doctor yesterday, he said we got to wait and see what Dr. Darlene De La Rosa says\"    Objective:  Updated POC to be completed by visit 10.     INTERVENTION: COMPLETED: SPECIFICS/COMMENTS:   Modality:     Paraffin Tx x X 10 min- completed as preconditioning prior to exercise   US x wrist to mid forearm x X 5 min/ tolerated well post activity   AROM/ AAROM     Thumb AROM/ AAROM x All planes as tolerated   Wrist AROM x All planes as tolerated   Exercise balls x    Tabletop blue ball ex  Roll into flexion/ ext- rolling into slight supination/ pronation (fair- tolerance for supination)   Thumb slides  Completed for each digit- hard   wristciser x Challenging/ decreasing compensation and pain   [place] in hand manipulation x Completed w/ pennies focus on thumb ROM   Towel exercise x Tolerated well   PROM/Stretching:     Gentle PROM digits/ wrist x Completed as tolerated- 9/10 pain in the wrist        Manual techniques:     Cross friction along thumb extensor tendon x    Soft tissue mob x wrist x Very sensitive in the volar , radial wrist   Strengthening:     Digiflex- red x Thumb flexion x 20, composite 20x, individual 10x each        Other:     Ortho fit  Hand based thumb splint fabricated. Stockinette provided for added comfort under the splint. Assessment/Comments: Pt is making Fair -progress toward stated plan of care. Significant, chronic pain continues in the wrist and hand. ROM is slightly improving but there are minimal changes relating to wrist/ forearm or hand strength. Pt is scheduled to return to the MD next week. At this point, therapy progress since the injection has been minimal.     Left UE AROM: Exceptions to WNL  Wrist flexion 0-65 to 0-75  Wrist extension 0-28 to 0-33  UD 0-5 to 0-35  RD 0-10 to 0-12  Thumb extension 0-50  Thumb flexion -3 cm  Thumb ABD 0-55  Opposition- intact to all digits except the small finger     Left UE Strength: Exceptions to WNL  Wrist 3/5 to 3/5  Pronation 3+/5 to 3+/5  Supination 4/5 to 4/5     Dynamometer (setting 2):                              Left: 12#/ 14#/ 14# (guarding)  to 13#                                                                         Pinch Meter:              Lateral: Left= 3.5# w/ shooting pains to 2.5#    -Rehab Potential: Good  -Requires OT Follow Up: Yes  Time In: 0815           Time Out: 0900          Visit # 10      Treatment Charges: Mins Time in - Time out  Units   Modalities: Paraffin 10 6642-4165 1   Ther Exercise 15 0835- 0850 1   Manual Therapy 10 0825- 0835 1   Thera Activities 10 0850- 0900 1   ADL/Home Mgt       Neuro Re-education      Gait Training      Group Therapy      Non-Billable Service Time      Other: Ortho fit      Total Time/Units 45  4       -Response to Treatment: Pt is discouraged by the pain. Pt feels the injection didn't help much. Support provided. Goals: Goals for pt can be seen on initial eval occurring on 7-13-20    Plan:   [x]  Continue Plan of care: Pt education continues at each visit to obtain maximum benefits from skilled OT intervention.   []  [address] of care:   []  Discharge:      Albina Reyes OT/L  [id number]

## 2020-08-19 ENCOUNTER — TREATMENT (OUTPATIENT)
Dept: OCCUPATIONAL THERAPY | Age: 70
End: 2020-08-19
Payer: COMMERCIAL

## 2020-08-19 PROCEDURE — 97530 THERAPEUTIC ACTIVITIES: CPT | Performed by: OCCUPATIONAL THERAPIST

## 2020-08-19 PROCEDURE — 97140 MANUAL THERAPY 1/> REGIONS: CPT | Performed by: OCCUPATIONAL THERAPIST

## 2020-08-19 PROCEDURE — 97110 THERAPEUTIC EXERCISES: CPT | Performed by: OCCUPATIONAL THERAPIST

## 2020-08-19 PROCEDURE — 97018 PARAFFIN BATH THERAPY: CPT | Performed by: OCCUPATIONAL THERAPIST

## 2020-08-19 NOTE — PROGRESS NOTES
[facility]    OCCUPATIONAL THERAPY PROGRESS NOTE    Date:  2020  Initial Evaluation Date: 20    Patient Name:  Brittani Dillard. :  1950  Restrictions/Precautions: Activity as tolerated, Low fall risk  Diagnosis:  Contusion L hand (O42.844R)                                                     Insurance/Certification information:  [facility] #[id number]  Referring Physician:  Dr Socoror Coronado  Date of Surgery/Injury: injection 20  Plan of care signed (Y/N):  N  Visit# / total visits: 11/ up to 18 (C9 20 to 20)- date extension has been requested    Pain Level: 8 on scale of 1-10, aching, sharp, throbbing and uncomfortable in the left hand/ wrist    Subjective:  Pt presents with no new complaints. Objective:  Updated POC to be completed by visit 25.     INTERVENTION: COMPLETED: SPECIFICS/COMMENTS:   Modality:     Paraffin Tx x X 10 min- completed as preconditioning prior to exercise   US x wrist to mid forearm x X 5 min/ tolerated well post activity   AROM/ AAROM     Thumb AROM/ AAROM x All planes as tolerated   Wrist AROM x All planes as tolerated   Exercise balls x    Tabletop blue ball ex  Roll into flexion/ ext- rolling into slight supination/ pronation (fair- tolerance for supination)   Thumb slides  Completed for each digit- hard   wristciser x Challenging/ decreasing compensation and pain   [place] in hand manipulation x Completed w/ pennies focus on thumb ROM   Towel exercise x Tolerated well   PROM/Stretching:     Gentle PROM digits/ wrist x Completed as tolerated- 9/10 pain in the wrist        Manual techniques:     Cross friction along thumb extensor tendon x    Soft tissue mob x wrist x Very sensitive in the volar , radial wrist   Strengthening:     Digiflex- ^ to green x Thumb flexion x 20, composite 20x, individual 10x each   Weighted dowel 2# 1-20 Forearm rotation and deviations                  Other:     Ortho fit Hand based thumb splint fabricated. Stockinette provided for added comfort under the splint. Assessment/Comments: Pt is making Fair -progress toward stated plan of care. Significant, chronic pain continues in the wrist and hand. ROM is slightly improving but there are minimal changes relating to wrist/ forearm or hand strength. Pt is scheduled to return to the MD next week. At this point, therapy progress since the injection has been minimal.      Status as of 8-14-20  Left UE AROM: Exceptions to WNL  Wrist flexion 0-65 to 0-75  Wrist extension 0-28 to 0-33  UD 0-5 to 0-35  RD 0-10 to 0-12  Thumb extension 0-50  Thumb flexion -3 cm  Thumb ABD 0-55  Opposition- intact to all digits except the small finger     Left UE Strength: Exceptions to WNL  Wrist 3/5 to 3/5  Pronation 3+/5 to 3+/5  Supination 4/5 to 4/5     Dynamometer (setting 2):                              Left: 12#/ 14#/ 14# (guarding)  to 13#                                                                         Pinch Meter:              Lateral: Left= 3.5# w/ shooting pains to 2.5#    -Rehab Potential: Good  -Requires OT Follow Up: Yes  Time In: 1300          Time Out: 1355          Visit # 11      Treatment Charges: Mins Time in - Time out  Units   Modalities: Paraffin 10  1300- 1310 1   Ther Exercise 15 7117-1565 1   Manual Therapy 10 1310- 1320 1   Thera Activities 20 1335- 1355 1   ADL/Home Mgt       Neuro Re-education      Gait Training      Group Therapy      Non-Billable Service Time      Other: Ortho fit      Total Time/Units 55  4       -Response to Treatment: Pt is discouraged by the pain. Pt feels the injection didn't help much. Support provided. Goals: Goals for pt can be seen on initial eval occurring on 7-13-20    Plan:   [x]  Continue Plan of care: Pt education continues at each visit to obtain maximum benefits from skilled OT intervention.   []  [address] of care:   []  Discharge:      Netta Herrera OT/L  [id number]

## 2020-08-20 ENCOUNTER — OFFICE VISIT (OUTPATIENT)
Dept: ORTHOPEDIC SURGERY | Age: 70
End: 2020-08-20
Payer: COMMERCIAL

## 2020-08-20 VITALS — BODY MASS INDEX: 24.44 KG/M2 | TEMPERATURE: 98.1 F | RESPIRATION RATE: 18 BRPM | HEIGHT: 69 IN | WEIGHT: 165 LBS

## 2020-08-20 PROCEDURE — 99212 OFFICE O/P EST SF 10 MIN: CPT | Performed by: ORTHOPAEDIC SURGERY

## 2020-08-20 NOTE — PROGRESS NOTES
Chief Complaint   Patient presents with    Wrist Pain     Left DeQuervain's injection last visit; patient states worked for a little while          Keith Mittal is a 68y.o. year old  who presents for follow up of left de Quervain's tenosynovitis cortisone injection. Reports mild improvement in his symptoms. He reports he does feel that therapy is making a difference for him. He like to continue with therapy. He still having difficulty with wrist extension and pain along the FCR tendon. This is the region of his maximal tenderness and chief complaints at today's visit. He reports his ulnar-sided wrist symptoms are no longer that bothersome to him. He is somewhat frustrated by his progress.       Past Medical History:   Diagnosis Date    Cancer [facility])     prostate just recently DX July 2018    History of blood transfusion     1985 after \"ulcer surgery\"    Hyperlipidemia     Hypertension     Pain     low back, sacral     Past Surgical History:   Procedure Laterality Date    CATARACT REMOVAL WITH IMPLANT Right 07/31/2018    CATARACT REMOVAL WITH IMPLANT Left 07/02/2019    CYST REMOVAL      lt. axilla    HAND SURGERY      HEMORRHOID SURGERY      HERNIA REPAIR      INTRACAPSULAR CATARACT EXTRACTION Left 7/2/2019    LEFT EYE CATARACT EMULSIFICATION IOL IMPLANT - COMPLEX performed by Sheryl Riggins MD at [facility] Bilateral 08/17/2017    bilateral sacroiliac injection #1    NERVE BLOCK Bilateral 10/25/2017    Bilateral lumbar transforaminal epidural steroid injection    NERVE BLOCK Bilateral 11/02/2017    lumbar transforaminal epidural steriod injection #2    OTHER SURGICAL HISTORY  07-13-12    excision of cyst right 5th toe    TX XCAPSL CTRC RMVL INSJ IO LENS PROSTH W/O ECP Right 7/31/2018    CATARACT EXTRACTION WITH IOL RIGHT EYE performed by Sheryl Riggins MD at [facility]      ulcer repair       Current Outpatient Medications:    pantoprazole (PROTONIX) 40 MG tablet, TAKE 1 TABLET BY MOUTH EVERY DAY, Disp: , Rfl:     simvastatin (ZOCOR) 20 MG tablet, TAKE 1 TABLET BY MOUTH EVERY DAY, Disp: , Rfl:     diclofenac sodium (VOLTAREN) 1 % GEL, Apply 2 g topically 4 times daily, Disp: 2 Tube, Rfl: 1    amLODIPine (NORVASC) 10 MG tablet, Take 10 mg by mouth daily, Disp: , Rfl:   Allergies   Allergen Reactions    Codeine Nausea And Vomiting and Other (See Comments)     Dizziness, light headed    Motrin [Ibuprofen Micronized] Nausea And Vomiting     GI upset     Social History     Socioeconomic History    Marital status:      Spouse name: Not on file    Number of children: Not on file    Years of education: Not on file    Highest education level: Not on file   Occupational History    Not on file   Social Needs    Financial resource strain: Not on file    Food insecurity     Worry: Not on file     Inability: Not on file    Transportation needs     Medical: Not on file     Non-medical: Not on file   Tobacco Use    Smoking status: Never Smoker    Smokeless tobacco: Never Used   Substance and Sexual Activity    Alcohol use: No    Drug use: No    Sexual activity: Not on file   Lifestyle    Physical activity     Days per week: Not on file     Minutes per session: Not on file    Stress: Not on file   Relationships    Social connections     Talks on phone: Not on file     Gets together: Not on file     Attends Confucianist service: Not on file     Active member of club or organization: Not on file     Attends meetings of clubs or organizations: Not on file     Relationship status: Not on file    Intimate partner violence     Fear of current or ex partner: Not on file     Emotionally abused: Not on file     Physically abused: Not on file     Forced sexual activity: Not on file   Other Topics Concern    Not on file   Social History Narrative    Not on file     Family History   Problem Relation Age of Onset    Cancer Mother    [place] Cancer Father        Skin: (-) rash,(-) psoriasis,(-) eczema, (-)skin cancer. Musculoskeletal: (-) fractures,  (-) dislocations,(-) collagen vascular disease, (-) fibromyalgia, (-) multiple sclerosis, (-) muscular dystrophy, (-) RSD,(-) joint pain (-)swelling, (-) joint pain,swelling. Neurologic: (-) epilepsy, (-)seizures,(-) brain tumor,(-) TIA, (-)stroke, (-)headaches, (-)Parkinson disease,(-) memory loss, (-) LOC. Cardiovascular: (-) Chest pain, (-) swelling in legs/feet, (-) SOB, (-) cramping in legs/feet with walking. SUBJECTIVE:      Constitutional:    The patient is alert and oriented x 3, appears to be stated age and in no distress. LEFT upper extremity: Non-tender about the shoulder and elbow with good ROM. - tinels of the cubital tunnel, - tinels of the carpal tunnel, - durkans, minimal tenderness over the first extensor compartment, Negative finkelsteins, + CMC grind,  positive tenderness over the FCR tendon. Positive pain with wrist extension. No palpable FCR tendon, tenderness directly over the volar aspect of the radial wrist at the origin of the FCR tunnel, negative synergy test. previous scars well healed. - tenderness over the A1 pulleys with no active triggering. Full flexion and extension of the fingers. - wartenbergs and cross finger testing, APB strength 5/5 with no atrophy. Median, ulnar, radial n intact to light touch. Brisk capillary refill. Gross motor 5/5.          Impression: Continued pain left wrist now isolated mostly over the FCR tendon with no palpable tendon at today's visit. Continued pain at the basal joint arthroplasty site. Improved de Quervain's tenosynovitis and ulnar-sided wrist symptoms    Plan: Today's findings were explained the patient. He had modest improvement of his de Quervain's tenosynovitis. At today's visit his maximal tenderness is over the region of the FCR tendon and tunnel with absent FCR tendon.   This consistent with possible rupture of the FCR tendon or continued scar sensitivity from previous surgery and recent trauma. Recommended a cortisone injection at the region of maximal tenderness. He will follow-up if approved by [facility]. Continue with therapy in the interim.

## 2020-08-21 ENCOUNTER — TREATMENT (OUTPATIENT)
Dept: OCCUPATIONAL THERAPY | Age: 70
End: 2020-08-21
Payer: COMMERCIAL

## 2020-08-21 PROCEDURE — 97018 PARAFFIN BATH THERAPY: CPT | Performed by: OCCUPATIONAL THERAPIST

## 2020-08-21 PROCEDURE — 97110 THERAPEUTIC EXERCISES: CPT | Performed by: OCCUPATIONAL THERAPIST

## 2020-08-21 PROCEDURE — 97530 THERAPEUTIC ACTIVITIES: CPT | Performed by: OCCUPATIONAL THERAPIST

## 2020-08-21 PROCEDURE — 97140 MANUAL THERAPY 1/> REGIONS: CPT | Performed by: OCCUPATIONAL THERAPIST

## 2020-08-21 NOTE — PROGRESS NOTES
[facility] [place]    OCCUPATIONAL THERAPY PROGRESS NOTE    Date:  2020  Initial Evaluation Date: 20    Patient Name:  Madeleine Sorenson. :  1950  Restrictions/Precautions: Activity as tolerated, Low fall risk  Diagnosis:  Contusion L hand (O50.015P)                                                     Insurance/Certification information:  [facility] #[id number]  Referring Physician:  Dr Nani Juan  Date of Surgery/Injury: injection 20  Plan of care signed (Y/N):  N  Visit# / total visits: 12/ up to 18 (C9 20 to 20)- date extension has been requested    Pain Level: 8 on scale of 1-10, aching, sharp, throbbing and uncomfortable in the left hand/ wrist    Subjective:   Objective:  Updated POC to be completed by visit 10.     INTERVENTION: COMPLETED: SPECIFICS/COMMENTS:   Modality:     Paraffin Tx x X 10 min- completed as preconditioning prior to exercise   US x wrist to mid forearm x X 5 min/ tolerated well post activity   AROM/ AAROM     Thumb AROM/ AAROM x All planes as tolerated   Wrist AROM x All planes as tolerated   Exercise balls x    Tabletop blue ball ex  Roll into flexion/ ext- rolling into slight supination/ pronation (fair- tolerance for supination)   Thumb slides  Completed for each digit- hard   wristciser x Challenging/ decreasing compensation and pain   [place] in hand manipulation x Completed w/ pennies focus on thumb ROM   Towel exercise x Tolerated well   PROM/Stretching:     Gentle PROM digits/ wrist x Completed as tolerated- 9/10 pain in the wrist        Manual techniques:     Cross friction along thumb extensor tendon x    Soft tissue mob x wrist x Very sensitive in the volar , radial wrist   Strengthening:     Digiflex- green x Thumb flexion x 20, composite 20x, individual 10x each   Wrist PREs- 2# x 10x flexion/ ext- weak   Weighted dowel- 2# x Forearm rotation and deviations - 20x   Putty ex- med soft x Gripping x 2 min, roll ad alternating pinch, thumb flexion        Other:     Ortho fit  Hand based thumb splint fabricated. Stockinette provided for added comfort under the splint. Assessment/Comments: Pt is making Fair progress toward stated plan of care. Pt saw Dr Sandra Pillai for follow up. Another injection is recommended in the volar radial wrist. Awaiting  approval. MD recommends continuing therapy in the meantime.     -Rehab Potential: Good  -Requires OT Follow Up: Yes  Time In: 1300         Time Out: 1355          Visit # 12      Treatment Charges: Mins Time in - Time out  Units   Modalities: Paraffin 10 1583-8135 1   Ther Exercise 20 4658-1868 1   Manual Therapy 10 1043-8338 1   Thera Activities 15 2433-4388 1   ADL/Home Mgt       Neuro Re-education      Gait Training      Group Therapy      Non-Billable Service Time      Other: Ortho fit      Total Time/Units 55  4       -Response to Treatment: Pt remains frustrated by his condition. Goals: Goals for pt can be seen on initial eval occurring on 7-13-20    Plan:   [x]  Continue Plan of care: Pt education continues at each visit to obtain maximum benefits from skilled OT intervention.   []  [address] of care:   []  Discharge:      Zachary Terry OT/L  [id number]

## 2020-08-25 ENCOUNTER — TREATMENT (OUTPATIENT)
Dept: OCCUPATIONAL THERAPY | Age: 70
End: 2020-08-25
Payer: COMMERCIAL

## 2020-08-25 PROCEDURE — 97530 THERAPEUTIC ACTIVITIES: CPT | Performed by: OCCUPATIONAL THERAPY ASSISTANT

## 2020-08-25 PROCEDURE — 97110 THERAPEUTIC EXERCISES: CPT | Performed by: OCCUPATIONAL THERAPY ASSISTANT

## 2020-08-25 PROCEDURE — 97140 MANUAL THERAPY 1/> REGIONS: CPT | Performed by: OCCUPATIONAL THERAPY ASSISTANT

## 2020-08-25 PROCEDURE — 97018 PARAFFIN BATH THERAPY: CPT | Performed by: OCCUPATIONAL THERAPY ASSISTANT

## 2020-08-25 NOTE — PROGRESS NOTES
[facility]    OCCUPATIONAL THERAPY PROGRESS NOTE    Date:  2020  Initial Evaluation Date: 20    Patient Name:  Truong Toure. :  1950  Restrictions/Precautions: Activity as tolerated, Low fall risk  Diagnosis:  Contusion L hand (R96.243G)                                                     Insurance/Certification information:  [facility] #[id number]  Referring Physician:  Dr Penny Corcoran  Date of Surgery/Injury: injection 20  Plan of care signed (Y/N):  N  Visit# / total visits: 13/ up to 18 (C9 20 to 20)- date extension has been requested    Pain Level: 8 on scale of 1-10, aching, sharp, throbbing and uncomfortable in the left hand/ wrist    Subjective: \"I talked with workman's comp and they said they talked to Dr. Judy Fox and they are going to move forward with Dr's diagnosis\"      Objective:  Updated POC to be completed by visit 10.     INTERVENTION: COMPLETED: SPECIFICS/COMMENTS:   Modality:     Paraffin Tx x X 10 min- completed as preconditioning prior to exercise   US x wrist to mid forearm x X 5 min/ tolerated well post activity   AROM/ AAROM     Thumb AROM/ AAROM x All planes as tolerated   Wrist AROM x All planes as tolerated   Exercise balls x    Tabletop blue ball ex  Roll into flexion/ ext- rolling into slight supination/ pronation (fair- tolerance for supination)   Thumb slides  Completed for each digit- hard   wristciser x Challenging/ decreasing compensation and pain   [place] in hand manipulation x Completed w/ pennies focus on thumb ROM   Towel exercise x Tolerated well   PROM/Stretching:     Gentle PROM digits/ wrist x Completed as tolerated- 9/10 pain in the wrist        Manual techniques:     Cross friction along thumb extensor tendon x    Soft tissue mob x wrist x Very sensitive in the volar , radial wrist   Strengthening:     Digiflex- green x Thumb flexion x 20, composite 20x, individual 10x each   Wrist

## 2020-08-31 ENCOUNTER — TREATMENT (OUTPATIENT)
Dept: OCCUPATIONAL THERAPY | Age: 70
End: 2020-08-31
Payer: COMMERCIAL

## 2020-08-31 PROCEDURE — 97110 THERAPEUTIC EXERCISES: CPT | Performed by: OCCUPATIONAL THERAPIST

## 2020-08-31 PROCEDURE — 97530 THERAPEUTIC ACTIVITIES: CPT | Performed by: OCCUPATIONAL THERAPIST

## 2020-08-31 PROCEDURE — 97018 PARAFFIN BATH THERAPY: CPT | Performed by: OCCUPATIONAL THERAPIST

## 2020-08-31 NOTE — PROGRESS NOTES
[facility]    OCCUPATIONAL THERAPY PROGRESS NOTE    Date:  2020  Initial Evaluation Date: 20    Patient Name:  Maxwell Tamayo. :  1950  Restrictions/Precautions: Activity as tolerated, Low fall risk  Diagnosis:  Contusion L hand (V35.508R)                                                     Insurance/Certification information:  [facility] #[id number]  Referring Physician:  Dr Sean Lopez  Date of Surgery/Injury: injection 20  Plan of care signed (Y/N):  N  Visit# / total visits: 14/ up to 18 (C9 20 to 20)    Pain Level: 7 on scale of 1-10, aching, sharp, throbbing and uncomfortable in the left hand/ wrist    Subjective: Pt presents with no new complaints. Objective:  Updated POC to be completed by visit 10.     INTERVENTION: COMPLETED: SPECIFICS/COMMENTS:   Modality:     Paraffin Tx x X 10 min- completed as preconditioning prior to exercise   US x wrist to mid forearm  X 5 min/ tolerated well post activity   AROM/ AAROM     Thumb AROM/ AAROM x All planes as tolerated   Wrist AROM x All planes as tolerated   Exercise balls     Thumb slides  Completed for each digit- hard   wristciser x Challenging/ decreasing compensation and pain   [place] in hand manipulation  Completed w/ pennies focus on thumb ROM   Towel exercise  Tolerated well   PROM/Stretching:     Gentle PROM digits/ wrist x Completed as tolerated- 9/10 pain in the wrist        Manual techniques:     Cross friction along thumb extensor tendon x    Soft tissue mob x wrist x Very sensitive in the volar , radial wrist   Strengthening:     Digiflex- green x Thumb flexion x 20, composite 20x, individual 10x each- sore in the small finger   Wrist PREs- 2# x 10x flexion/ ext- very weak   Weighted dowel- 2# x Forearm rotation and deviations - 20x   Putty ex- med soft x Gripping x 2 min, roll ad alternating pinch, thumb flexion   La bar/ beige x Twist 2 directions 15x, supination 15x   Other:     Ortho fit  Hand based thumb splint fabricated. Stockinette provided for added comfort under the splint. Assessment/Comments: Pt is making Fair progress toward stated plan of care. Pain and stiffness in the wrist continued. Pt was reminded to use the short thumb splint only when doing moderate to heavy tasks. Injection has been approved but not yet scheduled. -Rehab Potential: Good  -Requires OT Follow Up: Yes  Time In: 0900         Time Out: 0945        Visit # 14      Treatment Charges: Mins Time in - Time out  Units   Modalities: Paraffin 10 0900- 0910 1   Ther Exercise 20 5962-0547 1   Manual Therapy 5 0532-0582    Thera Activities 10 0975- 2192 1   ADL/Home Mgt       Neuro Re-education      Gait Training      Group Therapy      Non-Billable US Service Time      Other: Ortho fit      Total Time/Units 45  4       -Response to Treatment: Pt reports being very sore in the wrist following stretches and strengthening. Goals: Goals for pt can be seen on initial eval occurring on 7-13-20    Plan:   [x]  Continue Plan of care: Pt education continues at each visit to obtain maximum benefits from skilled OT intervention.   []  [address] of care:   []  Discharge:      Dominga Cancino OT/L  [id number]

## 2020-09-03 ENCOUNTER — TREATMENT (OUTPATIENT)
Dept: OCCUPATIONAL THERAPY | Age: 70
End: 2020-09-03
Payer: COMMERCIAL

## 2020-09-03 PROCEDURE — 97530 THERAPEUTIC ACTIVITIES: CPT | Performed by: OCCUPATIONAL THERAPY ASSISTANT

## 2020-09-03 PROCEDURE — 97018 PARAFFIN BATH THERAPY: CPT | Performed by: OCCUPATIONAL THERAPY ASSISTANT

## 2020-09-03 PROCEDURE — 97110 THERAPEUTIC EXERCISES: CPT | Performed by: OCCUPATIONAL THERAPY ASSISTANT

## 2020-09-03 NOTE — PROGRESS NOTES
[facility]    OCCUPATIONAL THERAPY PROGRESS NOTE    Date:  9/3/2020  Initial Evaluation Date: 20    Patient Name:  Uriah Barker. :  1950  Restrictions/Precautions: Activity as tolerated, Low fall risk  Diagnosis:  Contusion L hand (L71.153S)                                                     Insurance/Certification information:  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     INTERVENTION: COMPLETED: SPECIFICS/COMMENTS:   Modality:     Paraffin Tx x X 10 min- completed as preconditioning prior to exercise   US x wrist to mid forearm  X 5 min/ tolerated well post activity   AROM/ AAROM     Thumb AROM/ AAROM x All planes as tolerated   Wrist AROM x All planes as tolerated   Exercise balls x    Thumb slides  Completed for each digit- hard   wristciser x Challenging/ decreasing compensation and pain   [place] in hand manipulation  Completed w/ pennies focus on thumb ROM   Towel exercise  Tolerated well   PROM/Stretching:     Gentle PROM digits/ wrist x Completed as tolerated- 9/10 pain in the wrist        Manual techniques:     Cross friction along thumb extensor tendon x    Soft tissue mob x wrist x Very sensitive in the volar , radial wrist   Strengthening:     Digiflex- green x Thumb flexion x 20, composite 20x, individual 10x each- sore in the small finger   Wrist PREs- 2# x 10x flexion/ ext- very weak   Weighted dowel- 2# x Forearm rotation and deviations - 20x   Putty ex- med soft x Gripping x 2 min, roll ad alternating pinch, thumb flexion   [place] bar/ beige Twist 2 directions 15x, supination 15x   Other:     Ortho fit  Hand based thumb splint fabricated. Stockinette provided for added comfort under the splint. Assessment/Comments: Pt is making Fair progress toward stated plan of care. Pain and stiffness in the wrist continued. Pt was reminded to use the short thumb splint only when doing moderate to heavy tasks. Injection has been approved but not yet scheduled. -Rehab Potential: Good  -Requires OT Follow Up: Yes  Time In: 0900         Time Out: 0940      Visit # 15      Treatment Charges: Mins Time in - Time out  Units   Modalities: Paraffin 10 0900- 0910 1   Ther Exercise 15 8065-3011 1   Manual Therapy 5 0910-0915 0   Thera Activities 10 8512-5066 1   ADL/Home Mgt       Neuro Re-education      Gait Training      Group Therapy      Non-Billable US Service Time      Other: Ortho fit      Total Time/Units 45  3       -Response to Treatment: Pt reports being very sore in the wrist following stretches and strengthening. Goals: Goals for pt can be seen on initial eval occurring on 7-13-20    Plan:   [x]  Continue Plan of care: Pt education continues at each visit to obtain maximum benefits from skilled OT intervention.   []  [address] of care:   []  Discharge:      Marcelo PATEL/JOELLE [id number]

## 2020-09-10 ENCOUNTER — TREATMENT (OUTPATIENT)
Dept: OCCUPATIONAL THERAPY | Age: 70
End: 2020-09-10
Payer: COMMERCIAL

## 2020-09-10 PROCEDURE — 97018 PARAFFIN BATH THERAPY: CPT | Performed by: OCCUPATIONAL THERAPIST

## 2020-09-10 PROCEDURE — 97110 THERAPEUTIC EXERCISES: CPT | Performed by: OCCUPATIONAL THERAPIST

## 2020-09-10 PROCEDURE — 97140 MANUAL THERAPY 1/> REGIONS: CPT | Performed by: OCCUPATIONAL THERAPIST

## 2020-09-10 NOTE — PROGRESS NOTES
[facility]    OCCUPATIONAL THERAPY PROGRESS NOTE    Date:  9/10/2020  Initial Evaluation Date: 20    Patient Name:  Liliana Prajapati. :  1950  Restrictions/Precautions: Activity as tolerated, Low fall risk  Diagnosis:  Contusion L hand (D39.953E)                                                     Insurance/Certification information:  [facility] #[id number]  Referring Physician:  Dr Thu Varner  Date of Surgery/Injury: injection 20  Plan of care signed (Y/N):  N  Visit# / total visits: 16/ up to 18 (C9 20 to 20)    Pain Level: 9 on scale of 1-10, aching, sharp, throbbing and uncomfortable in the left hand/ wrist    Subjective: Pt stated \"I over did in the kitchen at work. It hurts real bad\". Objective:  Updated POC to be completed by visit 10.     INTERVENTION: COMPLETED: SPECIFICS/COMMENTS:   Modality:     Paraffin Tx x X 10 min- completed as preconditioning prior to exercise   US x wrist to mid forearm x X 5 min/ tolerated well post activity   AROM/ AAROM     Thumb AROM/ AAROM x All planes as tolerated   Wrist AROM x All planes as tolerated   Exercise balls     Thumb slides  Completed for each digit- hard   wristciser  Challenging/ decreasing compensation and pain   [place] in hand manipulation  Completed w/ pennies focus on thumb ROM   Towel exercise  Tolerated well   PROM/Stretching:     Gentle PROM digits/ wrist x Completed as tolerated- 9/10 pain in the wrist        Manual techniques:     Cross friction along thumb extensor tendon x    Soft tissue mob x wrist x Very sensitive in the volar , radial wrist   Strengthening:     Digiflex- green  Thumb flexion x 20, composite 20x, individual 10x each- sore in the small finger   Wrist PREs- 2#  10x flexion/ ext- very weak   Weighted dowel- 2#  Forearm rotation and deviations - 20x   Putty ex- med soft  Gripping x 2 min, roll ad alternating pinch, thumb flexion   La bar/ beige  Twist

## 2020-09-17 ENCOUNTER — TREATMENT (OUTPATIENT)
Dept: OCCUPATIONAL THERAPY | Age: 70
End: 2020-09-17
Payer: COMMERCIAL

## 2020-09-17 PROCEDURE — 97140 MANUAL THERAPY 1/> REGIONS: CPT | Performed by: OCCUPATIONAL THERAPY ASSISTANT

## 2020-09-17 PROCEDURE — 97018 PARAFFIN BATH THERAPY: CPT | Performed by: OCCUPATIONAL THERAPY ASSISTANT

## 2020-09-17 PROCEDURE — 97110 THERAPEUTIC EXERCISES: CPT | Performed by: OCCUPATIONAL THERAPY ASSISTANT

## 2020-09-17 NOTE — PROGRESS NOTES
[facility]    OCCUPATIONAL THERAPY PROGRESS NOTE    Date:  2020  Initial Evaluation Date: 20    Patient Name:  Damon Rockwell. :  1950  Restrictions/Precautions: Activity as tolerated, Low fall risk  Diagnosis:  Contusion L hand (P98.673K)                                                     Insurance/Certification information:  [facility] #[id number]  Referring Physician:  Dr Luis M Alcocer  Date of Surgery/Injury: injection 20  Plan of care signed (Y/N):  N  Visit# / total visits: 17/ up to 18 (C9 20 to 20)    Pain Level: 8 on scale of 1-10, aching, sharp, throbbing and uncomfortable in the left hand/ wrist    Subjective: Pt stated \"I go back to the doc next week for my injection\"  Objective:  Updated POC to be completed by visit 10.     INTERVENTION: COMPLETED: SPECIFICS/COMMENTS:   Modality:     Paraffin Tx x X 10 min- completed as preconditioning prior to exercise   US x wrist to mid forearm x X 7 min/ tolerated well post activity   AROM/ AAROM     Thumb AROM/ AAROM x All planes as tolerated   Wrist AROM x All planes as tolerated   Exercise balls     Thumb slides  Completed for each digit- hard   wristciser  Challenging/ decreasing compensation and pain   [place] in hand manipulation  Completed w/ pennies focus on thumb ROM   Towel exercise x Tolerated well   PROM/Stretching:     Gentle PROM digits/ wrist x Completed as tolerated- 9/10 pain in the wrist        Manual techniques:     Cross friction along thumb extensor tendon x    Soft tissue mob x wrist  Very sensitive in the volar , radial wrist   Strengthening:     Digiflex- green  Thumb flexion x 20, composite 20x, individual 10x each- sore in the small finger   Wrist PREs- 2#  10x flexion/ ext- very weak   Weighted dowel- 2#  Forearm rotation and deviations - 20x   Putty ex- med soft  Gripping x 2 min, roll ad alternating pinch, thumb flexion   La bar/ beige  Twist 2 directions 15x, supination 15x   Other:     Ortho fit  Hand based thumb splint fabricated. [place]in[place] provided for added comfort under the splint. Assessment/Comments: Pt is making Fair progress toward stated plan of care. Pain and wrist extension limitations continue to inhibit progress. Overall status is unchanged from prior to last injection. At this point, therapy has been of minimal help. Pt is for follow up with his MD. Will await MD recommendations.     -Rehab Potential: Good  -Requires OT Follow Up: Yes  Time In: 0905         Time Out: 0945   Visit # 17      Treatment Charges: Mins Time in - Time out  Units   Modalities: Paraffin 10 0905- 0915 1   Ther Exercise 15 7077-4643 1   Manual Therapy 10 8332-9991 1   Thera Activities      ADL/Home Mgt       Neuro Re-education      Gait Training      Group Therapy      Non-Billable US Service Time 5 1755-1430 0   Other: Ortho fit      Total Time/Units 40  3       -Response to Treatment: Pt is concerned about the lack of progress in his wrist/ hand. Support provided. Goals: Goals for pt can be seen on initial eval occurring on 7-13-20    Plan:   []  Continue Plan of care: Continue ROM and progressive activity as tolerated. Pt education continues at each visit to obtain maximum benefits from skilled OT intervention. [x]  Alter Plan of care: Hold pending MD recommendations.   []  Discharge:      Con PATEL/JOELLE [id number]

## 2020-09-24 ENCOUNTER — OFFICE VISIT (OUTPATIENT)
Dept: ORTHOPEDIC SURGERY | Age: 70
End: 2020-09-24
Payer: COMMERCIAL

## 2020-09-24 VITALS — BODY MASS INDEX: 25.18 KG/M2 | TEMPERATURE: 98 F | HEIGHT: 69 IN | WEIGHT: 170 LBS

## 2020-09-24 PROCEDURE — 20550 NJX 1 TENDON SHEATH/LIGAMENT: CPT | Performed by: ORTHOPAEDIC SURGERY

## 2020-09-24 PROCEDURE — 99212 OFFICE O/P EST SF 10 MIN: CPT | Performed by: ORTHOPAEDIC SURGERY

## 2020-09-24 RX ORDER — BETAMETHASONE SODIUM PHOSPHATE AND BETAMETHASONE ACETATE 3; 3 MG/ML; MG/ML
6 INJECTION, SUSPENSION INTRA-ARTICULAR; INTRALESIONAL; INTRAMUSCULAR; SOFT TISSUE ONCE
Status: COMPLETED | OUTPATIENT
Start: 2020-09-24 | End: 2020-09-24

## 2020-09-24 RX ADMIN — BETAMETHASONE SODIUM PHOSPHATE AND BETAMETHASONE ACETATE 6 MG: 3; 3 INJECTION, SUSPENSION INTRA-ARTICULAR; INTRALESIONAL; INTRAMUSCULAR; SOFT TISSUE at 09:36

## 2020-09-24 RX ADMIN — BETAMETHASONE SODIUM PHOSPHATE AND BETAMETHASONE ACETATE 6 MG: 3; 3 INJECTION, SUSPENSION INTRA-ARTICULAR; INTRALESIONAL; INTRAMUSCULAR; SOFT TISSUE at 09:35

## 2020-09-24 NOTE — PROGRESS NOTES
Chief Complaint   Patient presents with    Work Related Injury     pt here for workers comp visit regarding left wrist injection Rell Alva is a 68y.o. year old  who presents for follow up of left de Quervain's tenosynovitis cortisone injection. Reports mild improvement in his symptoms. He reports he does feel that therapy is making a difference for him. He like to continue with therapy. He still having difficulty with wrist extension and pain along the FCR tendon. This is the region of his maximal tenderness and chief complaints at today's visit. He reports his ulnar-sided wrist symptoms are no longer that bothersome to him. He is somewhat frustrated by his progress. He returns today. Worker's Compensation has approved his cortisone injection. He still remains with pain over the dorsal aspect of his thumb CMC joint. Remains with hypersensitivity over the volar aspect.       Past Medical History:   Diagnosis Date    Cancer [facility])     prostate just recently DX July 2018    History of blood transfusion     1985 after \"ulcer surgery\"    Hyperlipidemia     Hypertension     Pain     low back, sacral     Past Surgical History:   Procedure Laterality Date    CATARACT REMOVAL WITH IMPLANT Right 07/31/2018    CATARACT REMOVAL WITH IMPLANT Left 07/02/2019    CYST REMOVAL      lt. axilla    HAND SURGERY      HEMORRHOID SURGERY      HERNIA REPAIR      INTRACAPSULAR CATARACT EXTRACTION Left 7/2/2019    LEFT EYE CATARACT EMULSIFICATION IOL IMPLANT - COMPLEX performed by Krishan Jensen MD at [facility] Bilateral 08/17/2017    bilateral sacroiliac injection #1    NERVE BLOCK Bilateral 10/25/2017    Bilateral lumbar transforaminal epidural steroid injection    NERVE BLOCK Bilateral 11/02/2017    lumbar transforaminal epidural steriod injection #2    OTHER SURGICAL HISTORY  07-13-12    excision of cyst right 5th toe    GA XCAPSL CTRC RMVL INSJ IO LENS PROSTH W/O ECP Right 7/31/2018    CATARACT EXTRACTION WITH IOL RIGHT EYE performed by Sheila Garcia MD at [facility]      ulcer repair       Current Outpatient Medications:     pantoprazole (PROTONIX) 40 MG tablet, TAKE 1 TABLET BY MOUTH EVERY DAY, Disp: , Rfl:     simvastatin (ZOCOR) 20 MG tablet, TAKE 1 TABLET BY MOUTH EVERY DAY, Disp: , Rfl:     diclofenac sodium (VOLTAREN) 1 % GEL, Apply 2 g topically 4 times daily, Disp: 2 Tube, Rfl: 1    amLODIPine (NORVASC) 10 MG tablet, Take 10 mg by mouth daily, Disp: , Rfl:   Allergies   Allergen Reactions    Codeine Nausea And Vomiting and Other (See Comments)     Dizziness, light headed    Motrin [Ibuprofen Micronized] Nausea And Vomiting     GI upset     Social History     Socioeconomic History    Marital status:      Spouse name: Not on file    Number of children: Not on file    Years of education: Not on file    Highest education level: Not on file   Occupational History    Not on file   Social Needs    Financial resource strain: Not on file    Food insecurity     Worry: Not on file     Inability: Not on file    Transportation needs     Medical: Not on file     Non-medical: Not on file   Tobacco Use    Smoking status: Never Smoker    Smokeless tobacco: Never Used   Substance and Sexual Activity    Alcohol use: No    Drug use: No    Sexual activity: Not on file   Lifestyle    Physical activity     Days per week: Not on file     Minutes per session: Not on file    Stress: Not on file   Relationships    Social connections     Talks on phone: Not on file     Gets together: Not on file     Attends Jehovah's witness service: Not on file     Active member of club or organization: Not on file     Attends meetings of clubs or organizations: Not on file     Relationship status: Not on file    Intimate partner violence     Fear of current or ex partner: Not on file     Emotionally abused: Not on file     Physically abused: Not on file Forced sexual activity: Not on file   Other Topics Concern    Not on file   Social History Narrative    Not on file     Family History   Problem Relation Age of Onset    Cancer Mother     Cancer Father        Skin: (-) rash,(-) psoriasis,(-) eczema, (-)skin cancer. Musculoskeletal: (-) fractures,  (-) dislocations,(-) collagen vascular disease, (-) fibromyalgia, (-) multiple sclerosis, (-) muscular dystrophy, (-) RSD,(-) joint pain (-)swelling, (-) joint pain,swelling. Neurologic: (-) epilepsy, (-)seizures,(-) brain tumor,(-) TIA, (-)stroke, (-)headaches, (-)Parkinson disease,(-) memory loss, (-) LOC. Cardiovascular: (-) Chest pain, (-) swelling in legs/feet, (-) SOB, (-) cramping in legs/feet with walking. SUBJECTIVE:      Constitutional:    The patient is alert and oriented x 3, appears to be stated age and in no distress. LEFT upper extremity: Non-tender about the shoulder and elbow with good ROM. - tinels of the cubital tunnel, - tinels of the carpal tunnel, - durkans, minimal tenderness over the first extensor compartment, Negative finkelsteins, + CMC grind,  positive tenderness over the FCR tendon. Positive pain with wrist extension. No palpable FCR tendon, tenderness directly over the volar aspect of the radial wrist at the origin of the FCR tunnel, negative synergy test. previous scars well healed. - tenderness over the A1 pulleys with no active triggering. Full flexion and extension of the fingers. - wartenbergs and cross finger testing, APB strength 5/5 with no atrophy. Median, ulnar, radial n intact to light touch. Brisk capillary refill. Gross motor 5/5.      X-rays of his left wrist from October 8, 2019 were given reviewed. This demonstrates subsidence of his previous trapezectomy ligament reconstruction with some scaphoid trapezoidal arthritis present. Impression: Continued pain left wrist now isolated mostly over the FCR tendon with no palpable tendon at today's visit.   Continued

## 2020-11-05 ENCOUNTER — OFFICE VISIT (OUTPATIENT)
Dept: ORTHOPEDIC SURGERY | Age: 70
End: 2020-11-05
Payer: COMMERCIAL

## 2020-11-05 VITALS — TEMPERATURE: 97.1 F | RESPIRATION RATE: 20 BRPM | WEIGHT: 168 LBS | BODY MASS INDEX: 24.88 KG/M2 | HEIGHT: 69 IN

## 2020-11-05 PROCEDURE — 99212 OFFICE O/P EST SF 10 MIN: CPT | Performed by: ORTHOPAEDIC SURGERY

## 2020-11-05 NOTE — PROGRESS NOTES
Chief Complaint   Patient presents with    Wrist Pain     left wrist The left wrist FCR tunnel was injected at last visit 9/24         Handy Hood is a 68y.o. year old  who presents for follow up after his injection over the FCR tendon. He reports no significant improvement in symptomatology. He did also have a previous injection over his first dorsal extensor compartment which did provide him some relief in the past.    His history is as follows:   Patient states in 2015 he fell on ice injuring his left hand. Dr. Koki Jones performed a left thumb trapezia ectomy ligament reconstruction. He did well following that surgery. 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 He went to the emergency department where x-rays were obtained. He was found to have a contusion of the left thumb. He was placed in a brace. He followed up with Worker's Compensation. He started therapy. He was in a brace for about 2 months. He did see Dr. Koki Jones last year who recommended a cortisone injection this never was followed through. He has tried Voltaren gel in the past which did help. He is currently out of Voltaren gel. Therapy also helped his symptoms but this is stopped secondary to coronavirus. He denies any numbness or tingling. Patient did have an MRI of his left wrist and left hand which demonstrates subsidence of his previous trapeziectomy with ligament reconstruction. He reports continued pain diffusely over the wrist.  When asked to localize his pain he reports is over the dorsum of his wrist.  Biggest complaint is inability to flex or terminally extend his wrist secondary to pain dorsally.   However he does report diffuse pain all over his wrist.  He reports pain palmarly dorsally ulnarly and radially about the wrist.      Past Medical History:   Diagnosis Date    Cancer [facility])     prostate just Occupational History    Not on file   Social Needs    Financial resource strain: Not on file    Food insecurity     Worry: Not on file     Inability: Not on file    Transportation needs     Medical: Not on file     Non-medical: Not on file   Tobacco Use    Smoking status: Never Smoker    Smokeless tobacco: Never Used   Substance and Sexual Activity    Alcohol use: No    Drug use: No    Sexual activity: Not on file   Lifestyle    Physical activity     Days per week: Not on file     Minutes per session: Not on file    Stress: Not on file   Relationships    Social connections     Talks on phone: Not on file     Gets together: Not on file     Attends Nondenominational service: Not on file     Active member of club or organization: Not on file     Attends meetings of clubs or organizations: Not on file     Relationship status: Not on file    Intimate partner violence     Fear of current or ex partner: Not on file     Emotionally abused: Not on file     Physically abused: Not on file     Forced sexual activity: Not on file   Other Topics Concern    Not on file   Social History Narrative    Not on file     Family History   Problem Relation Age of Onset    Cancer Mother     Cancer Father        Skin: (-) rash,(-) psoriasis,(-) eczema, (-)skin cancer. Musculoskeletal: Continued left wrist pain  Neurologic: (-) epilepsy, (-)seizures,(-) brain tumor,(-) TIA, (-)stroke, (-)headaches, (-)Parkinson disease,(-) memory loss, (-) LOC. Cardiovascular: (-) Chest pain, (-) swelling in legs/feet, (-) SOB, (-) cramping in legs/feet with walking. SUBJECTIVE:      Constitutional:    The patient is alert and oriented x 3, appears to be stated age and in no distress. LEFT upper extremity: Non-tender about the shoulder and elbow with good ROM.  - tinels of the cubital tunnel, - tinels of the carpal tunnel, - durkans, minimal tenderness over the first extensor compartment, Negative finkelsteins, negative CMC grind test however he does have pain dorsally over the arthroplasty site. ,  positive per sensitive tenderness over the FCR tendon. Positive pain with wrist extension. Positive tenderness directly over the scapholunate ligament. No palpable FCR tendon, tenderness directly over the volar aspect of the radial wrist at the origin of the FCR tunnel, negative synergy test. previous scars well healed. - tenderness over the A1 pulleys with no active triggering. Full flexion and extension of the fingers. - wartenbergs and cross finger testing, APB strength 5/5 with no atrophy. Median, ulnar, radial n intact to light touch. Brisk capillary refill. Gross motor 5/5.      X-rays of his left wrist from October 8, 2019 were given reviewed. This demonstrates subsidence of his previous trapezectomy ligament reconstruction with some scaphoid trapezoidal arthritis present. New x-rays in the office today AP lateral obliques of the left hand were obtained compared to previous images. He does have subsidence of the previous thumb basal joint arthroplasty. There are some arthritic changes forming over the DRUJ. Negative DISI or VISI deformity. Mild scaphoid trapezoidal joint arthritis. Impression office x-rays: Degenerative changes noted as above    Impression: Continued diffuse left wrist pain. FCR injection not beneficial.  Mild improvement with de Quervain's injection. Pain now localizable dorsally over the wrist.  Recommended a cortisone injection into the carpal joint if approved by [facility]. Plan: Today's findings were explained. He does have diffuse left wrist symptoms. He has failed a course of conservative management including therapy. He reports that therapy was working initially but was no longer beneficial.  He does wear a neoprene wrap on his wrist.  Recommended immobilizing the wrist with a cock-up wrist brace.   To try to localize his symptomatology a cortisone injection into the radiocarpal joint was recommended. Patient will follow up if these are approved through [facility].

## 2020-11-23 ENCOUNTER — OFFICE VISIT (OUTPATIENT)
Dept: ORTHOPEDIC SURGERY | Age: 70
End: 2020-11-23
Payer: COMMERCIAL

## 2020-11-23 VITALS — HEIGHT: 69 IN | BODY MASS INDEX: 24.88 KG/M2 | WEIGHT: 168 LBS | TEMPERATURE: 97.7 F | RESPIRATION RATE: 22 BRPM

## 2020-11-23 PROCEDURE — 20605 DRAIN/INJ JOINT/BURSA W/O US: CPT | Performed by: PHYSICIAN ASSISTANT

## 2020-11-23 PROCEDURE — 99213 OFFICE O/P EST LOW 20 MIN: CPT | Performed by: PHYSICIAN ASSISTANT

## 2020-11-23 RX ORDER — BETAMETHASONE SODIUM PHOSPHATE AND BETAMETHASONE ACETATE 3; 3 MG/ML; MG/ML
6 INJECTION, SUSPENSION INTRA-ARTICULAR; INTRALESIONAL; INTRAMUSCULAR; SOFT TISSUE ONCE
Status: COMPLETED | OUTPATIENT
Start: 2020-11-23 | End: 2020-11-23

## 2020-11-23 RX ADMIN — BETAMETHASONE SODIUM PHOSPHATE AND BETAMETHASONE ACETATE 6 MG: 3; 3 INJECTION, SUSPENSION INTRA-ARTICULAR; INTRALESIONAL; INTRAMUSCULAR; SOFT TISSUE at 12:05

## 2020-11-23 NOTE — PROGRESS NOTES
Chief Complaint   Patient presents with    Wrist Pain     left wrist pain         Baldo Espinosa is a 68y.o. year old  who presents for follow up after his injection over the FCR tendon. He reports no significant improvement in symptomatology. He did also have a previous injection over his first dorsal extensor compartment which did provide him some relief in the past.    His history is as follows:   Patient states in 2015 he fell on ice injuring his left hand. Dr. Sayda Tabor performed a left thumb trapezia ectomy ligament reconstruction. He did well following that surgery. 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 He went to the emergency department where x-rays were obtained. He was found to have a contusion of the left thumb. He was placed in a brace. He followed up with Worker's Compensation. He started therapy. He was in a brace for about 2 months. He did see Dr. Sayda Tabor last year who recommended a cortisone injection this never was followed through. He has tried Voltaren gel in the past which did help. He is currently out of Voltaren gel. Therapy also helped his symptoms but this is stopped secondary to coronavirus. He denies any numbness or tingling. Patient did have an MRI of his left wrist and left hand which demonstrates subsidence of his previous trapeziectomy with ligament reconstruction. He reports continued pain diffusely over the wrist.  When asked to localize his pain he reports is over the dorsum of his wrist.  Biggest complaint is inability to flex or terminally extend his wrist secondary to pain dorsally. However he does report diffuse pain all over his wrist.  He reports pain palmarly dorsally ulnarly and radially about the wrist.    Patient presents today for an approved injection to the left wrist.  No new complaints. No new injuries.       Past Medical History:   Diagnosis Date    Cancer [facility])     prostate just recently DX July 2018    History of blood transfusion     1985 after \"ulcer surgery\"    Hyperlipidemia     Hypertension     Pain     low back, sacral     Past Surgical History:   Procedure Laterality Date    CATARACT REMOVAL WITH IMPLANT Right 07/31/2018    CATARACT REMOVAL WITH IMPLANT Left 07/02/2019    CYST REMOVAL      lt. axilla    HAND SURGERY      HEMORRHOID SURGERY      HERNIA REPAIR      INTRACAPSULAR CATARACT EXTRACTION Left 7/2/2019    LEFT EYE CATARACT EMULSIFICATION IOL IMPLANT - COMPLEX performed [facility] Sally [facility]anes MD at [facility] Bilateral 08/17/2017    bilateral sacroiliac injection #1    NERVE BLOCK Bilateral 10/25/2017    Bilateral lumbar transforaminal epidural steroid injection    NERVE BLOCK Bilateral 11/02/2017    lumbar transforaminal epidural steriod injection #2    OTHER SURGICAL HISTORY  07-13-12    excision of cyst right 5th toe    DE XCAPSL CTRC RMVL INSJ IO LENS PROSTH W/O ECP Right 7/31/2018    CATARACT EXTRACTION WITH IOL RIGHT EYE performed by [facility]anes MD at [facility]      ulcer repair       Current Outpatient Medications:     pantoprazole (PROTONIX) 40 MG tablet, TAKE 1 TABLET BY MOUTH EVERY DAY, Disp: , Rfl:     simvastatin (ZOCOR) 20 MG tablet, TAKE 1 TABLET BY MOUTH EVERY DAY, Disp: , Rfl:     diclofenac sodium (VOLTAREN) 1 % GEL, Apply 2 g topically 4 times daily, Disp: 2 Tube, Rfl: 1    amLODIPine (NORVASC) 10 MG tablet, Take 10 mg by mouth daily, Disp: , Rfl:   Allergies   Allergen Reactions    Codeine Nausea And Vomiting and Other (See Comments)     Dizziness, light headed    Motrin [Ibuprofen Micronized] Nausea And Vomiting     GI upset     Social History     Socioeconomic History    Marital status:      Spouse name: Not on file    Number of children: Not on file    Years of education: Not on file    Highest education level: Not on file   Occupational History    Not on file   Social Needs    Financial resource strain: Not on file    Food insecurity     Worry: Not on file     Inability: Not on file    Transportation needs     Medical: Not on file     Non-medical: Not on file   Tobacco Use    Smoking status: Never Smoker    Smokeless tobacco: Never Used   Substance and Sexual Activity    Alcohol use: No    Drug use: No    Sexual activity: Not on file   Lifestyle    Physical activity     Days per week: Not on file     Minutes per session: Not on file    Stress: Not on file   Relationships    Social connections     Talks on phone: Not on file     Gets together: Not on file     Attends Am[employer] service: Not on file     Active member of club or organization: Not on file     Attends meetings of clubs or organizations: Not on file     Relationship status: Not on file    Intimate partner violence     Fear of current or ex partner: Not on file     Emotionally abused: Not on file     Physically abused: Not on file     Forced sexual activity: Not on file   Other Topics Concern    Not on file   Social History Narrative    Not on file     Family History   Problem Relation Age of Onset    Cancer Mother     Cancer Father        Skin: (-) rash,(-) psoriasis,(-) eczema, (-)skin cancer. Musculoskeletal: left wrist pain  Neurologic: (-) epilepsy, (-)seizures,(-) brain tumor,(-) TIA, (-)stroke, (-)headaches, (-)Parkinson disease,(-) memory loss, (-) LOC. Cardiovascular: (-) Chest pain, (-) swelling in legs/feet, (-) SOB, (-) cramping in legs/feet with walking. SUBJECTIVE:      Constitutional:    The patient is alert and oriented x 3, appears to be stated age and in no distress. LEFT upper extremity: Non-tender about the shoulder and elbow with good ROM.  - tinels of the cubital tunnel, - tinels of the carpal tunnel, - durkans, negative tenderness over the first extensor compartment, Negative finkelsteins, negative CMC grind test however he does have pain dorsally over the arthroplasty site,  positive tenderness over the FCR tendon. Positive pain with wrist extension. Positive tenderness directly over the scapholunate ligament. No palpable FCR tendon, tenderness directly over the volar aspect of the radial wrist at the origin of the FCR tunnel, negative synergy test. previous scars well healed. - tenderness over the A1 pulleys with no active triggering. Full flexion and extension of the fingers. - wartenbergs and cross finger testing, APB strength 5/5 with no atrophy. Median, ulnar, radial n intact to light touch. Brisk capillary refill. Gross motor 5/5.        Impression:   Continued diffuse left wrist pain. FCR injection not beneficial.  Mild improvement with de Quervain's injection. Pain now localizable dorsally over the wrist.    HTN, Hyperlipidemia    Plan: Today's findings were explained. Patient was referred for a left wrist cortisone injection along with a cock-up wrist brace. Cock-up wrist brace provided to the patient. Patient consented and injection was tolerated well. Patient to follow-up in 6 weeks to assess response of injection. All questions answered. Procedure Note Wrist Cortisone Injection    The left wrist was identified as the injection site. The risk and benefits of a cortisone injection were explained and the patient consented to the injection. Under sterile conditions, the wrist was injected with a mixture of 1 mL of 1% Lidocaine and 1 mL of 6 mg/mL Betamethasone without complication. A sterile bandage was applied.

## 2021-01-14 ENCOUNTER — OFFICE VISIT (OUTPATIENT)
Dept: ORTHOPEDIC SURGERY | Age: 71
End: 2021-01-14
Payer: COMMERCIAL

## 2021-01-14 VITALS — HEIGHT: 69 IN | TEMPERATURE: 97.3 F | BODY MASS INDEX: 25.18 KG/M2 | WEIGHT: 170 LBS | RESPIRATION RATE: 18 BRPM

## 2021-01-14 DIAGNOSIS — M77.8 LEFT WRIST TENDINITIS: ICD-10-CM

## 2021-01-14 DIAGNOSIS — M65.4 TENOSYNOVITIS, DE QUERVAIN: Primary | ICD-10-CM

## 2021-01-14 PROCEDURE — 99214 OFFICE O/P EST MOD 30 MIN: CPT | Performed by: ORTHOPAEDIC SURGERY

## 2021-01-14 NOTE — PROGRESS NOTES
Chief Complaint   Patient presents with    Hand Pain     left hand pain, last injection 9/24/20 patient states injection did not help. Alexx Hunt is a 65y.o. year old  who presents for follow up for his continued wrist pain. He reports no significant improvement in symptomatology. He did also have a previous injection over his first dorsal extensor compartment which did provide him some relief in the past, he has also had wrist injections as well as TFCC injections without relief. His history is as follows:   Patient states in 2015 he fell on ice injuring his left hand. Dr. Petros Espinosa performed a left thumb trapezia ectomy ligament reconstruction. He did well following that surgery. 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 He went to the emergency department where x-rays were obtained. He was found to have a contusion of the left thumb. He was placed in a brace. He followed up with Worker's Compensation. He started therapy. He was in a brace for about 2 months. He did see Dr. Petros Espinosa last year who recommended a cortisone injection this never was followed through. He has tried Voltaren gel in the past which did help. He is currently out of Voltaren gel. Therapy also helped his symptoms but this is stopped secondary to coronavirus. He denies any numbness or tingling. Patient did have an MRI of his left wrist and left hand which demonstrates subsidence of his previous trapeziectomy with ligament reconstruction. He reports continued pain diffusely over the wrist.  When asked to localize his pain he reports is over the dorsum of his wrist.  Biggest complaint is inability to flex or terminally extend his wrist secondary to pain dorsally.   However he does report diffuse pain all over his wrist.  He reports pain palmarly dorsally ulnarly and radially about the wrist. Most notably radially. Patient states his wrist pain was exacerbated after a stick hit his wrist.  He states the pain has not improved.       Past Medical History:   Diagnosis Date    Cancer [facility])     prostate just recently DX July 2018    History of blood transfusion     1985 after \"ulcer surgery\"    Hyperlipidemia     Hypertension     Pain     low back, sacral     Past Surgical History:   Procedure Laterality Date    CATARACT REMOVAL WITH IMPLANT Right 07/31/2018    CATARACT REMOVAL WITH IMPLANT Left 07/02/2019    CYST REMOVAL      lt. axilla    HAND SURGERY      HEMORRHOID SURGERY      HERNIA REPAIR      INTRACAPSULAR CATARACT EXTRACTION Left 7/2/2019    LEFT EYE CATARACT EMULSIFICATION IOL IMPLANT - COMPLEX [facility] 08/17/2017    bilateral sacroiliac injection #1    NERVE BLOCK Bilateral 10/25/2017    Bilateral lumbar transforaminal epidural steroid injection    NERVE BLOCK Bilateral 11/02/2017    lumbar transforaminal epidural steriod injection #2    OTHER SURGICAL HISTORY  07-13-12    excision of cyst right 5th toe    ND XCAPSL CTRC RMVL INSJ IO LENS PROSTH W/O ECP Right 7/31/2018    CATARACT EXTRACTION WITH IOL RIGHT EYE performed by [facility] MD at [facility]      ulcer repair       Current Outpatient Medications:     pantoprazole (PROTONIX) 40 MG tablet, TAKE 1 TABLET BY MOUTH EVERY DAY, Disp: , Rfl:     simvastatin (ZOCOR) 20 MG tablet, TAKE 1 TABLET BY MOUTH EVERY DAY, Disp: , Rfl:     diclofenac sodium (VOLTAREN) 1 % GEL, Apply 2 g topically 4 times daily, Disp: 2 Tube, Rfl: 1    amLODIPine (NORVASC) 10 MG tablet, Take 10 mg by mouth daily, Disp: , Rfl:   Allergies   Allergen Reactions    Codeine Nausea And Vomiting and Other (See Comments)     Dizziness, light headed    Motrin [Ibuprofen Micronized] Nausea And Vomiting     GI upset     Social History     Socioeconomic History  Marital status:      Spouse name: Not on file    Number of children: Not on file    Years of education: Not on file    Highest education level: Not on file   Occupational History    Not on file   Social Needs    Financial resource strain: Not on file    Food insecurity     Worry: Not on file     Inability: Not on file    Transportation needs     Medical: Not on file     Non-medical: Not on file   Tobacco Use    Smoking status: Never Smoker    Smokeless tobacco: Never Used   Substance and Sexual Activity    Alcohol use: No    Drug use: No    Sexual activity: Not on file   Lifestyle    Physical activity     Days per week: Not on file     Minutes per session: Not on file    Stress: Not on file   Relationships    Social connections     Talks on phone: Not on file     Gets together: Not on file     Attends Confucianist service: Not on file     Active member of club or organization: Not on file     Attends meetings of clubs or organizations: Not on file     Relationship status: Not on file    Intimate partner violence     Fear of current or ex partner: Not on file     Emotionally abused: Not on file     Physically abused: Not on file     Forced sexual activity: Not on file   Other Topics Concern    Not on file   Social History Narrative    Not on file     Family History   Problem Relation Age of Onset    Cancer Mother     Cancer Father        Skin: No skin lesions. Musculoskeletal: left wrist pain globally  Neurologic: no numbness, tingling  Cardiovascular: No acute chest pain    SUBJECTIVE:      Constitutional:    The patient is alert and oriented x 3, appears to be stated age and in no distress. LEFT upper extremity: Non-tender about the shoulder and elbow with good ROM.  - tinels of the cubital tunnel, - tinels of the carpal tunnel, - durkans, negative tenderness over the first extensor compartment, Negative finkelsteins, negative CMC grind test however he does have pain dorsally over the arthroplasty site,  positive tenderness over the FCR tendon. Positive pain with wrist extension. Positive tenderness directly over the scapholunate ligament. No palpable FCR tendon, tenderness directly over the volar aspect of the radial wrist at the origin of the FCR tunnel, negative synergy test. previous scars well healed. Significant tenderness to the snuffbox region. - tenderness over the A1 pulleys with no active triggering. Full flexion and extension of the fingers. - wartenbergs and cross finger testing, APB strength 5/5 with no atrophy. Median, ulnar, radial n intact to light touch. Brisk capillary refill. Gross motor 5/5.        Impression:   Continued diffuse left wrist pain. FCR injection not beneficial.  No improvement with de Quervain's injection. Pain now localizable dorsally over the wrist.    HTN, Hyperlipidemia    Plan: Today's findings were explained. Patient continues to have refractory symptoms to his current treatments. He has had no relief from corticosteroid injections to this point. He has continued radial sided wrist pain. Recommendations were made to proceed with wrist arthroscopy, possible revision CMC arthroplasty and tendon debridement as needed. The patient was updated and agreeable to plan. We will plan on submitting this to worker comp. I explained the risks, benefits, alternatives and complications of surgery with the patient including but not limited to the risks of infection, possible damage to nerves, vessels, or tendons, stiffness, loss of range of motion, scar sensitivity, wound healing complications, worsening symptoms, possible need for therapy, as well as the possible need further surgery and unanticipated complications. The patient voiced understanding and all questions were answered. The patient elected to proceed with surgical intervention. I have seen and evaluated the patient and agree with the above assessment and plan on today's visit.  I have performed the key components of the history and physical examination with significant findings of patient with diffuse nonspecific recalcitrant left hand pain. On today's examination he does have ulnar-sided wrist pain over the TFCC, moderate tenderness over the dorsum of the wrist, exquisite tenderness over the FCR tunnel and his old surgical site of the basal joint. In addition he does have continued tenderness and pain over the first extensor compartment. He does not wish to pursue any further injections. He is requesting surgical intervention to improve his hand pain. It also like to have improved wrist extension which he was advised may or may not occur with therapy. After extensive discussion he like to proceed with a diagnostic wrist arthroscopy with debridement and possible TFCC repair, extensor tenolysis of the first extensor compartment, revision basal joint arthroplasty with use of mini tight rope device, and lastly FCR tenotomy. He understands that this may or may not improve his hand pain and dysfunction. He understands that he will have increased pain and swelling perioperatively and will require therapy afterwards. All questions answered. I concur with the findings and plan as documented.     Damion Franco MD  1/14/2021

## 2021-05-06 ENCOUNTER — HOSPITAL ENCOUNTER (OUTPATIENT)
Age: 71
Discharge: HOME | End: 2021-05-08
Payer: MEDICARE

## 2021-05-06 DIAGNOSIS — Z01.818 PREOP TESTING: ICD-10-CM

## 2021-05-06 PROCEDURE — U0005 INFEC AGEN DETEC AMPLI PROBE: HCPCS

## 2021-05-06 PROCEDURE — U0003 INFECTIOUS AGENT DETECTION BY NUCLEIC ACID (DNA OR RNA); SEVERE ACUTE RESPIRATORY SYNDROME CORONAVIRUS 2 (SARS-COV-2) (CORONAVIRUS DISEASE [COVID-19]), AMPLIFIED PROBE TECHNIQUE, MAKING USE OF HIGH THROUGHPUT TECHNOLOGIES AS DESCRIBED BY CMS-2020-01-R: HCPCS

## 2021-05-07 NOTE — PROGRESS NOTES
[facility] PRE-ADMISSION TESTING INSTRUCTIONS    The Preadmission Testing patient is instructed accordingly using the following criteria (check applicable):    ARRIVAL INSTRUCTIONS:  [x] Parking the day of Surgery is located in the Main Entrance lot. Upon entering the door, make an immediate right to the surgery reception desk    [x] Bring photo ID and insurance card    [] Bring in a copy of Living will or Durable Power of  papers. [x] Please be sure to arrange for responsible adult to provide transportation to and from the hospital    [x] Please arrange for responsible adult to be with you for the 24 hour period post procedure due to having anesthesia      GENERAL INSTRUCTIONS:    [x] Nothing by mouth after midnight, including gum, candy, mints or water    [x] You may brush your teeth, but do not swallow any water    [] Take medications as instructed with 1-2 oz of water    [] Stop herbal supplements and vitamins 5 days prior to procedure    [] Follow preop dosing of blood thinners per physician instructions    [] Take 1/2 dose of evening insulin, but no insulin after midnight    [] No oral diabetic medications after midnight    [] If diabetic and have low blood sugar or feel symptomatic, take 1-2oz apple juice only    [] Bring inhalers day of surgery    [] Bring C-PAP/ Bi-Pap day of surgery    [] Bring urine specimen day of surgery    [x] Shower or bath with soap, lather and rinse well, AM of Surgery, no lotion, powders or creams to surgical site    [] Follow bowel prep as instructed per surgeon    [x] No tobacco products within 24 hours of surgery     [x] No alcohol or illegal drug use within 24 hours of surgery.     [x] Jewelry, body piercing's, eyeglasses, contact lenses and dentures are not permitted into surgery (bring cases)      [] Please do not wear any nail polish, make up or hair products on the day of surgery    [x] You can expect a call the business day prior to

## 2021-05-07 NOTE — PROGRESS NOTES
Have you been tested for COVID  Yes           Have you been told you were positive for COVID No  Have you had any known exposure to someone that is positive for COVID No  Do you have a cough                   No              Do you have shortness of breath No                 Do you have a sore throat            No                Are you having chills                    No                Are you having muscle aches. No                    Please come to the hospital wearing a mask and have your significant other wear a mask as well. Both of you should check your temperature before leaving to come here,  if it is 100 or higher please call [phone] for instruction.

## 2021-05-08 LAB
SARS-COV-2: NOT DETECTED
SOURCE: NORMAL

## 2021-05-10 ENCOUNTER — PREP FOR PROCEDURE (OUTPATIENT)
Dept: ORTHOPEDIC SURGERY | Age: 71
End: 2021-05-10

## 2021-05-10 RX ORDER — SODIUM CHLORIDE 9 MG/ML
INJECTION, SOLUTION INTRAVENOUS CONTINUOUS
Status: CANCELLED | OUTPATIENT
Start: 2021-05-10

## 2021-05-10 RX ORDER — SODIUM CHLORIDE 9 MG/ML
25 INJECTION, SOLUTION INTRAVENOUS PRN
Status: CANCELLED | OUTPATIENT
Start: 2021-05-10

## 2021-05-10 RX ORDER — SODIUM CHLORIDE 0.9 % (FLUSH) 0.9 %
5-40 SYRINGE (ML) INJECTION EVERY 12 HOURS SCHEDULED
Status: CANCELLED | OUTPATIENT
Start: 2021-05-10

## 2021-05-10 RX ORDER — SODIUM CHLORIDE 0.9 % (FLUSH) 0.9 %
5-40 SYRINGE (ML) INJECTION PRN
Status: CANCELLED | OUTPATIENT
Start: 2021-05-10

## 2021-05-12 ENCOUNTER — ANESTHESIA (OUTPATIENT)
Dept: OPERATING ROOM | Age: 71
End: 2021-05-12
Payer: COMMERCIAL

## 2021-05-12 ENCOUNTER — APPOINTMENT (OUTPATIENT)
Dept: GENERAL RADIOLOGY | Age: 71
End: 2021-05-12
Attending: ORTHOPAEDIC SURGERY
Payer: COMMERCIAL

## 2021-05-12 ENCOUNTER — ANESTHESIA EVENT (OUTPATIENT)
Dept: OPERATING ROOM | Age: 71
End: 2021-05-12
Payer: COMMERCIAL

## 2021-05-12 ENCOUNTER — HOSPITAL ENCOUNTER (OUTPATIENT)
Age: 71
Setting detail: OUTPATIENT SURGERY
Discharge: HOME | End: 2021-05-12
Attending: ORTHOPAEDIC SURGERY | Admitting: ORTHOPAEDIC SURGERY
Payer: COMMERCIAL

## 2021-05-12 VITALS
BODY MASS INDEX: 24.88 KG/M2 | TEMPERATURE: 97.5 F | WEIGHT: 168 LBS | HEART RATE: 68 BPM | RESPIRATION RATE: 16 BRPM | HEIGHT: 69 IN | SYSTOLIC BLOOD PRESSURE: 151 MMHG | OXYGEN SATURATION: 95 % | DIASTOLIC BLOOD PRESSURE: 81 MMHG

## 2021-05-12 VITALS
TEMPERATURE: 94.3 F | OXYGEN SATURATION: 100 % | RESPIRATION RATE: 12 BRPM | DIASTOLIC BLOOD PRESSURE: 59 MMHG | SYSTOLIC BLOOD PRESSURE: 114 MMHG

## 2021-05-12 DIAGNOSIS — M18.12 ARTHRITIS OF CARPOMETACARPAL (CMC) JOINT OF LEFT THUMB: ICD-10-CM

## 2021-05-12 DIAGNOSIS — Z01.818 PREOP TESTING: Primary | ICD-10-CM

## 2021-05-12 PROCEDURE — 2709999900 HC NON-CHARGEABLE SUPPLY: Performed by: ORTHOPAEDIC SURGERY

## 2021-05-12 PROCEDURE — 2500000003 HC RX 250 WO HCPCS: Performed by: ORTHOPAEDIC SURGERY

## 2021-05-12 PROCEDURE — 7100000001 HC PACU RECOVERY - ADDTL 15 MIN: Performed by: ORTHOPAEDIC SURGERY

## 2021-05-12 PROCEDURE — 2720000010 HC SURG SUPPLY STERILE: Performed by: ORTHOPAEDIC SURGERY

## 2021-05-12 PROCEDURE — 6360000002 HC RX W HCPCS: Performed by: ORTHOPAEDIC SURGERY

## 2021-05-12 PROCEDURE — 93005 ELECTROCARDIOGRAM TRACING: CPT

## 2021-05-12 PROCEDURE — C1713 ANCHOR/SCREW BN/BN,TIS/BN: HCPCS | Performed by: ORTHOPAEDIC SURGERY

## 2021-05-12 PROCEDURE — 29846 WRIST ARTHROSCOPY/SURGERY: CPT | Performed by: ORTHOPAEDIC SURGERY

## 2021-05-12 PROCEDURE — 7100000010 HC PHASE II RECOVERY - FIRST 15 MIN: Performed by: ORTHOPAEDIC SURGERY

## 2021-05-12 PROCEDURE — 25447 ARTHRP NTRCRP/CRP/MTCR NTRPS: CPT | Performed by: ORTHOPAEDIC SURGERY

## 2021-05-12 PROCEDURE — 3600000013 HC SURGERY LEVEL 3 ADDTL 15MIN: Performed by: ORTHOPAEDIC SURGERY

## 2021-05-12 PROCEDURE — 3700000000 HC ANESTHESIA ATTENDED CARE: Performed by: ORTHOPAEDIC SURGERY

## 2021-05-12 PROCEDURE — 6360000002 HC RX W HCPCS: Performed by: NURSE ANESTHETIST, CERTIFIED REGISTERED

## 2021-05-12 PROCEDURE — 2580000003 HC RX 258: Performed by: ORTHOPAEDIC SURGERY

## 2021-05-12 PROCEDURE — 3600000003 HC SURGERY LEVEL 3 BASE: Performed by: ORTHOPAEDIC SURGERY

## 2021-05-12 PROCEDURE — 6370000000 HC RX 637 (ALT 250 FOR IP): Performed by: ANESTHESIOLOGY

## 2021-05-12 PROCEDURE — 25001 INCISE FLEXOR CARPI RADIALIS: CPT | Performed by: ORTHOPAEDIC SURGERY

## 2021-05-12 PROCEDURE — 7100000011 HC PHASE II RECOVERY - ADDTL 15 MIN: Performed by: ORTHOPAEDIC SURGERY

## 2021-05-12 PROCEDURE — 3700000001 HC ADD 15 MINUTES (ANESTHESIA): Performed by: ORTHOPAEDIC SURGERY

## 2021-05-12 PROCEDURE — 2500000003 HC RX 250 WO HCPCS: Performed by: NURSE ANESTHETIST, CERTIFIED REGISTERED

## 2021-05-12 PROCEDURE — 3209999900 FLUORO FOR SURGICAL PROCEDURES

## 2021-05-12 PROCEDURE — 7100000000 HC PACU RECOVERY - FIRST 15 MIN: Performed by: ORTHOPAEDIC SURGERY

## 2021-05-12 PROCEDURE — 6360000002 HC RX W HCPCS: Performed by: ANESTHESIOLOGY

## 2021-05-12 DEVICE — KIT IMPL DIA1.1MM CMC S STL REP MINI TIGHTROPE: Type: IMPLANTABLE DEVICE | Site: WRIST | Status: FUNCTIONAL

## 2021-05-12 RX ORDER — FENTANYL CITRATE 50 UG/ML
INJECTION, SOLUTION INTRAMUSCULAR; INTRAVENOUS PRN
Status: DISCONTINUED | OUTPATIENT
Start: 2021-05-12 | End: 2021-05-12 | Stop reason: SDUPTHER

## 2021-05-12 RX ORDER — HYDROCODONE BITARTRATE AND ACETAMINOPHEN 5; 325 MG/1; MG/1
2 TABLET ORAL PRN
Status: COMPLETED | OUTPATIENT
Start: 2021-05-12 | End: 2021-05-12

## 2021-05-12 RX ORDER — SODIUM CHLORIDE 0.9 % (FLUSH) 0.9 %
5-40 SYRINGE (ML) INJECTION EVERY 12 HOURS SCHEDULED
Status: DISCONTINUED | OUTPATIENT
Start: 2021-05-12 | End: 2021-05-12 | Stop reason: HOSPADM

## 2021-05-12 RX ORDER — ROCURONIUM BROMIDE 10 MG/ML
INJECTION, SOLUTION INTRAVENOUS PRN
Status: DISCONTINUED | OUTPATIENT
Start: 2021-05-12 | End: 2021-05-12 | Stop reason: SDUPTHER

## 2021-05-12 RX ORDER — DEXAMETHASONE SODIUM PHOSPHATE 4 MG/ML
INJECTION, SOLUTION INTRA-ARTICULAR; INTRALESIONAL; INTRAMUSCULAR; INTRAVENOUS; SOFT TISSUE PRN
Status: DISCONTINUED | OUTPATIENT
Start: 2021-05-12 | End: 2021-05-12 | Stop reason: SDUPTHER

## 2021-05-12 RX ORDER — GLYCOPYRROLATE 1 MG/5 ML
SYRINGE (ML) INTRAVENOUS PRN
Status: DISCONTINUED | OUTPATIENT
Start: 2021-05-12 | End: 2021-05-12 | Stop reason: SDUPTHER

## 2021-05-12 RX ORDER — BUPIVACAINE HYDROCHLORIDE AND EPINEPHRINE 5; 5 MG/ML; UG/ML
INJECTION, SOLUTION EPIDURAL; INTRACAUDAL; PERINEURAL PRN
Status: DISCONTINUED | OUTPATIENT
Start: 2021-05-12 | End: 2021-05-12 | Stop reason: ALTCHOICE

## 2021-05-12 RX ORDER — LIDOCAINE HYDROCHLORIDE 20 MG/ML
INJECTION, SOLUTION EPIDURAL; INFILTRATION; INTRACAUDAL; PERINEURAL PRN
Status: DISCONTINUED | OUTPATIENT
Start: 2021-05-12 | End: 2021-05-12 | Stop reason: SDUPTHER

## 2021-05-12 RX ORDER — NEOSTIGMINE METHYLSULFATE 1 MG/ML
INJECTION, SOLUTION INTRAVENOUS PRN
Status: DISCONTINUED | OUTPATIENT
Start: 2021-05-12 | End: 2021-05-12 | Stop reason: SDUPTHER

## 2021-05-12 RX ORDER — DIPHENHYDRAMINE HYDROCHLORIDE 50 MG/ML
12.5 INJECTION INTRAMUSCULAR; INTRAVENOUS
Status: DISCONTINUED | OUTPATIENT
Start: 2021-05-12 | End: 2021-05-12 | Stop reason: HOSPADM

## 2021-05-12 RX ORDER — SODIUM CHLORIDE 0.9 % (FLUSH) 0.9 %
5-40 SYRINGE (ML) INJECTION PRN
Status: DISCONTINUED | OUTPATIENT
Start: 2021-05-12 | End: 2021-05-12 | Stop reason: HOSPADM

## 2021-05-12 RX ORDER — HYDROCODONE BITARTRATE AND ACETAMINOPHEN 5; 325 MG/1; MG/1
1 TABLET ORAL PRN
Status: COMPLETED | OUTPATIENT
Start: 2021-05-12 | End: 2021-05-12

## 2021-05-12 RX ORDER — EPHEDRINE SULFATE/0.9% NACL/PF 50 MG/5 ML
SYRINGE (ML) INTRAVENOUS PRN
Status: DISCONTINUED | OUTPATIENT
Start: 2021-05-12 | End: 2021-05-12 | Stop reason: SDUPTHER

## 2021-05-12 RX ORDER — PROPOFOL 10 MG/ML
INJECTION, EMULSION INTRAVENOUS PRN
Status: DISCONTINUED | OUTPATIENT
Start: 2021-05-12 | End: 2021-05-12 | Stop reason: SDUPTHER

## 2021-05-12 RX ORDER — ONDANSETRON 2 MG/ML
INJECTION INTRAMUSCULAR; INTRAVENOUS PRN
Status: DISCONTINUED | OUTPATIENT
Start: 2021-05-12 | End: 2021-05-12 | Stop reason: SDUPTHER

## 2021-05-12 RX ORDER — SODIUM CHLORIDE 9 MG/ML
25 INJECTION, SOLUTION INTRAVENOUS PRN
Status: DISCONTINUED | OUTPATIENT
Start: 2021-05-12 | End: 2021-05-12 | Stop reason: HOSPADM

## 2021-05-12 RX ORDER — MEPERIDINE HYDROCHLORIDE 25 MG/ML
12.5 INJECTION INTRAMUSCULAR; INTRAVENOUS; SUBCUTANEOUS EVERY 5 MIN PRN
Status: DISCONTINUED | OUTPATIENT
Start: 2021-05-12 | End: 2021-05-12 | Stop reason: HOSPADM

## 2021-05-12 RX ORDER — HYDROCODONE BITARTRATE AND ACETAMINOPHEN 5; 325 MG/1; MG/1
1 TABLET ORAL EVERY 6 HOURS PRN
Qty: 20 TABLET | Refills: 0 | Status: SHIPPED | OUTPATIENT
Start: 2021-05-12 | End: 2021-05-20 | Stop reason: SDUPTHER

## 2021-05-12 RX ORDER — SODIUM CHLORIDE 9 MG/ML
INJECTION, SOLUTION INTRAVENOUS CONTINUOUS
Status: DISCONTINUED | OUTPATIENT
Start: 2021-05-12 | End: 2021-05-12 | Stop reason: HOSPADM

## 2021-05-12 RX ADMIN — HYDROMORPHONE HYDROCHLORIDE 0.5 MG: 1 INJECTION, SOLUTION INTRAMUSCULAR; INTRAVENOUS; SUBCUTANEOUS at 11:31

## 2021-05-12 RX ADMIN — Medication 2 G: at 08:46

## 2021-05-12 RX ADMIN — DEXAMETHASONE SODIUM PHOSPHATE 10 MG: 4 INJECTION, SOLUTION INTRAMUSCULAR; INTRAVENOUS at 08:54

## 2021-05-12 RX ADMIN — SODIUM CHLORIDE: 9 INJECTION, SOLUTION INTRAVENOUS at 08:46

## 2021-05-12 RX ADMIN — Medication 3 MG: at 10:33

## 2021-05-12 RX ADMIN — FENTANYL CITRATE 100 MCG: 50 INJECTION, SOLUTION INTRAMUSCULAR; INTRAVENOUS at 08:52

## 2021-05-12 RX ADMIN — HYDROMORPHONE HYDROCHLORIDE 0.5 MG: 1 INJECTION, SOLUTION INTRAMUSCULAR; INTRAVENOUS; SUBCUTANEOUS at 11:10

## 2021-05-12 RX ADMIN — Medication 10 MG: at 09:07

## 2021-05-12 RX ADMIN — Medication 10 MG: at 09:10

## 2021-05-12 RX ADMIN — FENTANYL CITRATE 50 MCG: 50 INJECTION, SOLUTION INTRAMUSCULAR; INTRAVENOUS at 10:25

## 2021-05-12 RX ADMIN — HYDROCODONE BITARTRATE AND ACETAMINOPHEN 2 TABLET: 5; 325 TABLET ORAL at 13:06

## 2021-05-12 RX ADMIN — HYDROMORPHONE HYDROCHLORIDE 0.5 MG: 1 INJECTION, SOLUTION INTRAMUSCULAR; INTRAVENOUS; SUBCUTANEOUS at 11:41

## 2021-05-12 RX ADMIN — Medication 0.2 MG: at 09:00

## 2021-05-12 RX ADMIN — Medication 10 MG: at 10:11

## 2021-05-12 RX ADMIN — ROCURONIUM BROMIDE 40 MG: 10 INJECTION, SOLUTION INTRAVENOUS at 08:58

## 2021-05-12 RX ADMIN — Medication 0.6 MG: at 10:33

## 2021-05-12 RX ADMIN — ONDANSETRON 4 MG: 2 INJECTION INTRAMUSCULAR; INTRAVENOUS at 10:17

## 2021-05-12 RX ADMIN — HYDROMORPHONE HYDROCHLORIDE 0.5 MG: 1 INJECTION, SOLUTION INTRAMUSCULAR; INTRAVENOUS; SUBCUTANEOUS at 11:17

## 2021-05-12 RX ADMIN — PROPOFOL 200 MG: 10 INJECTION, EMULSION INTRAVENOUS at 08:52

## 2021-05-12 RX ADMIN — LIDOCAINE HYDROCHLORIDE 100 MG: 20 INJECTION, SOLUTION EPIDURAL; INFILTRATION; INTRACAUDAL; PERINEURAL at 08:52

## 2021-05-12 ASSESSMENT — PULMONARY FUNCTION TESTS
PIF_VALUE: 20
PIF_VALUE: 20
PIF_VALUE: 5
PIF_VALUE: 1
PIF_VALUE: 15
PIF_VALUE: 19
PIF_VALUE: 20
PIF_VALUE: 15
PIF_VALUE: 15
PIF_VALUE: 20
PIF_VALUE: 20
PIF_VALUE: 1
PIF_VALUE: 19
PIF_VALUE: 15
PIF_VALUE: 20
PIF_VALUE: 0
PIF_VALUE: 20
PIF_VALUE: 26
PIF_VALUE: 20
PIF_VALUE: 19
PIF_VALUE: 20
PIF_VALUE: 2
PIF_VALUE: 15
PIF_VALUE: 20
PIF_VALUE: 15
PIF_VALUE: 20
PIF_VALUE: 20
PIF_VALUE: 19
PIF_VALUE: 20
PIF_VALUE: 15
PIF_VALUE: 20
PIF_VALUE: 15
PIF_VALUE: 20
PIF_VALUE: 19
PIF_VALUE: 20
PIF_VALUE: 0
PIF_VALUE: 15
PIF_VALUE: 2
PIF_VALUE: 20
PIF_VALUE: 20
PIF_VALUE: 1
PIF_VALUE: 15
PIF_VALUE: 19
PIF_VALUE: 20
PIF_VALUE: 30
PIF_VALUE: 18
PIF_VALUE: 15
PIF_VALUE: 15
PIF_VALUE: 20
PIF_VALUE: 2
PIF_VALUE: 20
PIF_VALUE: 19
PIF_VALUE: 15
PIF_VALUE: 20
PIF_VALUE: 15
PIF_VALUE: 20
PIF_VALUE: 20

## 2021-05-12 ASSESSMENT — PAIN SCALES - GENERAL
PAINLEVEL_OUTOF10: 8
PAINLEVEL_OUTOF10: 6
PAINLEVEL_OUTOF10: 0
PAINLEVEL_OUTOF10: 8
PAINLEVEL_OUTOF10: 9
PAINLEVEL_OUTOF10: 9
PAINLEVEL_OUTOF10: 7
PAINLEVEL_OUTOF10: 7
PAINLEVEL_OUTOF10: 8
PAINLEVEL_OUTOF10: 8
PAINLEVEL_OUTOF10: 7
PAINLEVEL_OUTOF10: 8
PAINLEVEL_OUTOF10: 6
PAINLEVEL_OUTOF10: 9

## 2021-05-12 ASSESSMENT — PAIN DESCRIPTION - LOCATION
LOCATION: ARM

## 2021-05-12 ASSESSMENT — PAIN DESCRIPTION - ORIENTATION
ORIENTATION: LEFT

## 2021-05-12 ASSESSMENT — PAIN DESCRIPTION - ONSET
ONSET: ON-GOING

## 2021-05-12 ASSESSMENT — PAIN DESCRIPTION - DESCRIPTORS
DESCRIPTORS: ACHING;PRESSURE

## 2021-05-12 ASSESSMENT — PAIN DESCRIPTION - PAIN TYPE
TYPE: SURGICAL PAIN

## 2021-05-12 ASSESSMENT — PAIN DESCRIPTION - FREQUENCY
FREQUENCY: CONTINUOUS

## 2021-05-12 ASSESSMENT — LIFESTYLE VARIABLES: SMOKING_STATUS: 0

## 2021-05-12 NOTE — H&P
Chief Complaint   Patient presents with    Hand Pain       left hand pain, last injection 9/24/20 patient states injection did not help.             Baldo Espinosa is a 65y.o. year old  who presents for follow up for his continued wrist pain. He reports no significant improvement in symptomatology. He did also have a previous injection over his first dorsal extensor compartment which did provide him some relief in the past, he has also had wrist injections as well as TFCC injections without relief.     His history is as follows:   Patient states in 2015 he fell on ice injuring his left hand.  Dr. Sayda Tabor performed a left thumb trapezia ectomy ligament reconstruction. Sandeep Hudson did well following that surgery.  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  A th5th thgthrthathdthethrth was upset and the patient inadvertently got hit in the left hand with a mop handle. Sandeep Hudson went to the emergency department where x-rays were obtained. Sandeep Hudson was found to have a contusion of the left thumb.  He was placed in a brace.  He followed up with Hyun Jackson started therapy. Sandeep Hudson was in a brace for about 2 months. Sandeep Hudson did see Dr. Sayda Tabor last year who recommended a cortisone injection this never was followed through. Sandeep Hudson has tried Voltaren gel in the past which did help. Sandeep Hudson is currently out of Voltaren gel. Benjiman Burows also helped his symptoms but this is stopped secondary to coronavirus. Sandeep Hudson denies any numbness or tingling.  Patient did have an MRI of his left wrist and left hand which demonstrates subsidence of his previous trapeziectomy with ligament reconstruction.     He reports continued pain diffusely over the wrist.  When asked to localize his pain he reports is over the dorsum of his wrist.  Biggest complaint is inability to flex or terminally extend his wrist secondary to pain dorsally.   However he does report diffuse pain all over his wrist.  He reports pain palmarly dorsally ulnarly and radially about the wrist.  Most notably radially.     Patient states his wrist pain was exacerbated after a stick hit his wrist.  He states the pain has not improved.        Past Medical History        Past Medical History:   Diagnosis Date    [facility]       prostate just recently DX July 2018    History of blood transfusion       1985 after \"ulcer surgery\"    Hyperlipidemia      Hypertension      Pain       low back, sacral         Past Surgical History         Past Surgical History:   Procedure Laterality Date    CATARACT REMOVAL WITH IMPLANT Right 07/31/2018    CATARACT REMOVAL WITH IMPLANT Left 07/02/2019    CYST REMOVAL         lt.  axilla    HAND SURGERY        HEMORRHOID SURGERY        HERNIA REPAIR        INTRACAPSULAR CATARACT EXTRACTION Left 7/2/2019     LEFT EYE CATARACT EMULSIFICATION IOL IMPLANT - COMPLEX performed by [facility]z [facility] at [facility] Bilateral 08/17/2017     bilateral sacroiliac injection #1    NERVE BLOCK Bilateral 10/25/2017     Bilateral lumbar transforaminal epidural steroid injection    NERVE BLOCK Bilateral 11/02/2017     lumbar transforaminal epidural steriod injection #2    OTHER SURGICAL HISTORY   07-13-12     excision of cyst right 5th toe    MI XCAPSL CTRC RMVL INSJ IO LENS PROSTH W/O ECP Right 7/31/2018     CATARACT EXTRACTION WITH IOL RIGHT EYE performed by [facility] at [facility]         ulcer repair           Current Medication      Current Outpatient Medications:     pantoprazole (PROTONIX) 40 MG tablet, TAKE 1 TABLET BY MOUTH EVERY DAY, Disp: , Rfl:     simvastatin (ZOCOR) 20 MG tablet, TAKE 1 TABLET BY MOUTH EVERY DAY, Disp: , Rfl:     diclofenac sodium (VOLTAREN) 1 % GEL, Apply 2 g topically 4 times daily, Disp: 2 Tube, Rfl: 1    amLODIPine (NORVASC) 10 MG tablet, Take 10 mg by mouth daily, Disp: , Rfl:            Allergies   Allergen Reactions    Codeine Nausea And Vomiting and Other (See Comments)       Dizziness, light headed    Motrin [Ibuprofen Micronized] Nausea And Vomiting       GI upset      Social History               Socioeconomic History    Marital status:        Spouse name: Not on file    Number of children: Not on file    Years of education: Not on file    Highest education level: Not on file   Occupational History    Not on file   Social Needs    Financial resource strain: Not on file    Food insecurity       Worry: Not on file       Inability: Not on file    Transportation needs       Medical: Not on file       Non-medical: Not on file   Tobacco Use    Smoking status: Never Smoker    Smokeless tobacco: Never Used   Substance and Sexual Activity    Alcohol use: No    Drug use: No    Sexual activity: Not on file   Lifestyle    Physical activity       Days per week: Not on file       Minutes per session: Not on file    Stress: Not on file   Relationships    Social connections       Talks on phone: Not on file       Gets together: Not on file       Attends Buddhist service: Not on file       Active member of club or organization: Not on file       Attends meetings of clubs or organizations: Not on file       Relationship status: Not on file    Intimate partner violence       Fear of current or ex partner: Not on file       Emotionally abused: Not on file       Physically abused: Not on file       Forced sexual activity: Not on file   Other Topics Concern    Not on file   Social History Narrative    Not on file         Family History         Family History   Problem Relation Age of Onset    Cancer Mother      Cancer Father              Skin: No skin lesions.   Musculoskeletal: left wrist pain globally  Neurologic: no numbness, tingling  Cardiovascular: No acute chest pain     SUBJECTIVE:        Constitutional:     The patient is alert and oriented x 3, appears to be stated age and in no distress.     LEFT upper extremity: Non-tender about the shoulder and elbow with good ROM. - tinels of the cubital tunnel, - tinels of the carpal tunnel, - durkans, negative tenderness over the first extensor compartment, Negative finkelsteins, negative CMC grind test however he does have pain dorsally over the arthroplasty site,  positive tenderness over the FCR tendon. Positive pain with wrist extension. Positive tenderness directly over the scapholunate ligament. No palpable FCR tendon, tenderness directly over the volar aspect of the radial wrist at the origin of the FCR tunnel, negative synergy test. previous scars well healed. Significant tenderness to the snuffbox region. - tenderness over the A1 pulleys with no active triggering. Full flexion and extension of the fingers. - wartenbergs and cross finger testing, APB strength 5/5 with no atrophy. Median, ulnar, radial n intact to light touch. Brisk capillary refill. Gross motor 5/5.         Impression:   Continued diffuse left wrist pain. FCR injection not beneficial.  No improvement with de Quervain's injection. Pain now localizable dorsally over the wrist.    HTN, Hyperlipidemia     Plan:      Today's findings were explained. Patient continues to have refractory symptoms to his current treatments. He has had no relief from corticosteroid injections to this point. He has continued radial sided wrist pain. Recommendations were made to proceed with wrist arthroscopy, possible revision CMC arthroplasty and tendon debridement as needed. The patient was updated and agreeable to plan. We will plan on submitting this to worker comp.        I explained the risks, benefits, alternatives and complications of surgery with the patient including but not limited to the risks of infection, possible damage to nerves, vessels, or tendons, stiffness, loss of range of motion, scar sensitivity, wound healing complications, worsening symptoms, possible need for therapy, as well as the possible need further surgery and unanticipated complications.   The patient voiced understanding and all questions were answered. The patient elected to proceed with surgical intervention.      I have seen and evaluated the patient and agree with the above assessment and plan on today's visit. I have performed the key components of the history and physical examination with significant findings of patient with diffuse nonspecific recalcitrant left hand pain. On today's examination he does have ulnar-sided wrist pain over the TFCC, moderate tenderness over the dorsum of the wrist, exquisite tenderness over the FCR tunnel and his old surgical site of the basal joint. In addition he does have continued tenderness and pain over the first extensor compartment. He does not wish to pursue any further injections. He is requesting surgical intervention to improve his hand pain. It also like to have improved wrist extension which he was advised may or may not occur with therapy. After extensive discussion he like to proceed with a diagnostic wrist arthroscopy with debridement and possible TFCC repair, extensor tenolysis of the first extensor compartment, revision basal joint arthroplasty with use of mini tight rope device, and lastly FCR tenotomy. He understands that this may or may not improve his hand pain and dysfunction. He understands that he will have increased pain and swelling perioperatively and will require therapy afterwards. All questions answered. I concur with the findings and plan as documented. The patient was counseled at length about the risks of tico Covid-19 during their perioperative period and any recovery window from their procedure. The patient was made aware that tico Covid-19  may worsen their prognosis for recovering from their procedure  and lend to a higher morbidity and/or mortality risk. All material risks, benefits, and reasonable alternatives including postponing the procedure were discussed.  The patient does wish to proceed with the procedure at this time. History and Physical Update     Patient was seen and examined. Patient's history and physical was reviewed with the patient. There has been no significant interval changes.       Electronically signed by Fidencio Leal DO on 5/12/2021 at 7:01 AM

## 2021-05-12 NOTE — PROGRESS NOTES
patient eating ice chips and a few bites of pudding. Requesting pain pills but states not ready to take them yet, wants to eat and  drink a little more before taking them.

## 2021-05-12 NOTE — PROGRESS NOTES
CLINICAL PHARMACY NOTE: MEDS TO [address] Select Patient?: No  Total # of Prescriptions Filled: 1   The following medications were delivered to the patient:  · norco 6-325 mg  Total # of Interventions Completed: 7  Time Spent (min): 60    Additional Documentation:

## 2021-05-12 NOTE — ANESTHESIA PRE PROCEDURE
Department of Anesthesiology  Preprocedure Note       Name:  Rosie West. Age:  65 y.o.  :  1950                                          MRN:  [id number]         Date:  2021      Surgeon: Gabriela Lopez):  Jaziel Hansen MD    Procedure: LEFT EYE CATARACT EMULSIFICATION IOL IMPLANT (Left )    Medications prior to admission:   Prior to Admission medications    Medication Sig Start Date End Date Taking? Authorizing Provider   pantoprazole (PROTONIX) 40 MG tablet TAKE 1 TABLET BY MOUTH EVERY DAY 20   Historical Provider, MD   simvastatin (ZOCOR) 20 MG tablet TAKE 1 TABLET BY MOUTH EVERY DAY 3/30/20   Historical Provider, MD   diclofenac sodium (VOLTAREN) 1 % GEL Apply 2 g topically 4 times daily 20   Jaziel Hansen MD   amLODIPine (NORVASC) 10 MG tablet Take 10 mg by mouth daily    Historical Provider, MD       Current medications:    No current facility-administered medications for this visit. No current outpatient medications on file.      Facility-Administered Medications Ordered in Other Visits   Medication Dose Route Frequency Provider Last Rate Last Admin    0.9 % sodium chloride infusion   Intravenous Continuous Jaziel Hansen MD        0.9 % sodium chloride infusion  25 mL Intravenous PRN Jaziel Hansen MD        ceFAZolin (ANCEF) 2000 mg in sterile water 20 mL IV syringe  2,000 mg Intravenous On Call to Arjun Downing MD        sodium chloride flush 0.9 % injection 5-40 mL  5-40 mL Intravenous 2 times per day Jaziel Hansen MD        sodium chloride flush 0.9 % injection 5-40 mL  5-40 mL Intravenous PRN Jaziel Hansen MD        meperidine (DEMEROL) injection 12.5 mg  12.5 mg Intravenous Q5 Min PRN Prashanth Scott MD        diphenhydrAMINE (BENADRYL) injection 12.5 mg  12.5 mg Intravenous Once PRN Prashanth Scott MD        HYDROcodone-acetaminophen [facility]) 5-325 MG per tablet 1 tablet  1 tablet Oral PRN Prashanth Scott MD        Or   Lori Montesinos HYDROcodone-acetaminophen (NORCO) 5-325 MG per tablet 2 tablet  2 tablet Oral PRN Destini Hastings MD        HYDROmorphone (DILAUDID) injection 0.5 mg  0.5 mg Intravenous Q5 Min PRN Destini Hastings MD        HYDROmorphone (DILAUDID) injection 0.25 mg  0.25 mg Intravenous Q5 Min PRN Destini Hastings MD           Allergies:     Allergies   Allergen Reactions    Codeine Nausea And Vomiting and Other (See Comments)     Dizziness, light headed    Motrin [Ibuprofen Micronized] Nausea And Vomiting     GI upset       Problem List:    Patient Active Problem List   Diagnosis Code    Embolism and thrombosis of superficial vein of right lower extremity I82.811    Sacroiliitis  M46.1    Chronic low back pain M54.5, G89.29    Lumbar facet arthropathy  M47.816    Somatic dysfunction of lumbosacral spine M99.09    Chronic pain syndrome G89.4    Disc displacement, lumbar M51.26    Neural foraminal stenosis of lumbar spine M99.73    Right cataract H26.9    Left cataract H26.9    Contusion of left hand N21.222P       Past Medical History:        Diagnosis Date    Cancer [facility])     prostate just recently DX July 2018    History of blood transfusion     1985 after \"ulcer surgery\"    Hyperlipidemia     Hypertension     Left wrist pain     Pain     low back, sacral       Past Surgical History:        Procedure Laterality Date    CATARACT REMOVAL WITH IMPLANT Right 07/31/2018    CATARACT REMOVAL WITH IMPLANT Left 07/02/2019    CYST REMOVAL      lt. axilla    HAND SURGERY      HEMORRHOID SURGERY      HERNIA REPAIR      INTRACAPSULAR CATARACT EXTRACTION Left 7/2/2019    LEFT EYE CATARACT EMULSIFICATION IOL IMPLANT - COMPLEX performed by Nathalie Bills MD at [facility] Bilateral 08/17/2017    bilateral sacroiliac injection #1    NERVE BLOCK Bilateral 10/25/2017    Bilateral lumbar transforaminal epidural steroid injection    NERVE BLOCK Bilateral 11/02/2017    lumbar transforaminal epidural steriod injection #2    OTHER SURGICAL HISTORY  07-13-12    excision of cyst right 5th toe    SC XCAPSL CTRC RMVL INSJ IO LENS PROSTH W/O ECP Right 7/31/2018    CATARACT EXTRACTION WITH IOL RIGHT EYE performed by Rajat Vieira MD at [facility]      ulcer repair       Social History:    Social History     Tobacco Use    Smoking status: Never Smoker    Smokeless tobacco: Never Used   Substance Use Topics    Alcohol use: No                                Counseling given: Not Answered      Vital Signs (Current): There were no vitals filed for this visit. BP Readings from Last 3 Encounters:   05/12/21 (!) 152/77   10/08/19 (!) 142/81   07/02/19 116/69       NPO Status:  >8.H                                                                               BMI:   Wt Readings from Last 3 Encounters:   05/12/21 168 lb (76.2 kg)   01/14/21 170 lb (77.1 kg)   11/23/20 168 lb (76.2 kg)     There is no height or weight on file to calculate BMI.    CBC: No results found for: WBC, RBC, HGB, HCT, MCV, RDW, PLT    CMP: No results found for: NA, K, CL, CO2, BUN, CREATININE, GFRAA, AGRATIO, LABGLOM, GLUCOSE, PROT, CALCIUM, BILITOT, ALKPHOS, AST, ALT    POC Tests: No results for input(s): POCGLU, POCNA, POCK, POCCL, POCBUN, POCHEMO, POCHCT in the last 72 hours.     Coags: No results found for: PROTIME, INR, APTT    HCG (If Applicable): No results found for: PREGTESTUR, PREGSERUM, HCG, HCGQUANT     ABGs: No results found for: PHART, PO2ART, HSQ5WEC, CBE0CTR, BEART, H9UPIXPM     Type & Screen (If Applicable):  No results found for: LABABO, [address]    Anesthesia Evaluation  Patient summary reviewed and Nursing notes reviewed no history of anesthetic complications:   Airway: Mallampati: II  TM distance: >3 FB   Neck ROM: full  Comment: Receding chin  Mouth opening: > = 3 FB Dental: normal exam         Pulmonary:Negative Pulmonary ROS breath sounds clear to auscultation      (-) not a current smoker                           Cardiovascular:  Exercise tolerance: good (>4 METS),   (+) hypertension:, hyperlipidemia        Rhythm: regular  Rate: normal      Cleared by cardiology     Beta Blocker:  Dose within 24 Hrs        PE comment: Bradycardia  Faint HS   Neuro/Psych:   Negative Neuro/Psych ROS              GI/Hepatic/Renal:   (+) PUD,          ROS comment: CA prostate. Endo/Other:    (+) : arthritis: OA., malignancy/cancer (prostate ). Abdominal:           Vascular: negative vascular ROS. Anesthesia Plan      general     ASA 2     (#5 LMA)  Induction: intravenous. MIPS: Postoperative opioids intended and Prophylactic antiemetics administered. Anesthetic plan and risks discussed with patient. Plan discussed with CRNA.                   Violeta Diaz MD   5/12/2021

## 2021-05-12 NOTE — BRIEF OP NOTE
Brief Postoperative Note      Patient: Eliud Mckeon YOB: 1950  MRN: [id number]    Date of Procedure: 5/12/2021    Pre-Op Diagnosis: TENOSYNOVITIS DE QUERVAIN LEFT WRIST TENDONITIS    Post-Op Diagnosis: Same       Procedure(s):  LEFT WRIST ARTHROSCOPY  WITH REVISION CMC ARTHROPLASTY AND MINI TIGHTROPE APPLICATION APPLICATION EXTENSOR TENOSYNOVECTOMY AND FCR TENOTOMY  ; PARTIAL TRAPEZOIDECTOMY +++ARTHREX+++    Surgeon(s):  Tamika Burrell MD    Assistant:  Resident: Watson Paulson DO    Anesthesia: General    Estimated Blood Loss (mL): Minimal    Complications: None    Specimens:   * No specimens in log *    Implants:  Implant Name Type Inv. Item Serial No.  Lot No. LRB No. Used Action   KIT IMPL DIA1. 1MM [facility] S STL REP MINI TIGHTROPE  KIT IMPL DIA1. 1MM [facility] S STL REP MINI [employer] [id number] Left 1 Implanted         Drains: * No LDAs found *    Findings: see op note    Electronically signed by Watson Paulson DO on 5/12/2021 at 10:57 AM

## 2021-05-13 LAB
EKG ATRIAL RATE: 48 BPM
EKG P AXIS: 65 DEGREES
EKG P-R INTERVAL: 198 MS
EKG Q-T INTERVAL: 416 MS
EKG QRS DURATION: 80 MS
EKG QTC CALCULATION (BAZETT): 371 MS
EKG R AXIS: 14 DEGREES
EKG T AXIS: 40 DEGREES
EKG VENTRICULAR RATE: 48 BPM

## 2021-05-13 NOTE — OP NOTE
[id number] [address]                                OPERATIVE REPORT    PATIENT NAME: Jose Appiah                    :        1950  MED REC NO:   [id number]                            ROOM:  ACCOUNT NO:   [de-identified]                           ADMIT DATE: 2021  PROVIDER:     Arina Guevara MD    DATE OF PROCEDURE:  2021    PREOPERATIVE DIAGNOSES:  1. Left thumb carpometacarpal arthritis, status post previous thumb  basal joint arthroplasty. 2.  Left wrist flexor carpi radialis tendonitis. 3.  Left ulnar-sided wrist pain consistent with degenerative _____ TFCC  tear. PROCEDURES PERFORMED:  1. Left wrist arthroscopy with debridement of dorsal ulnar synovitis  and central TFCC tear. 2.  Left thumb revision carpometacarpal arthroplasty with application of  Arthrex mini TightRope system. 3.  Left thumb partial trapezoidectomy. 4.  Left wrist flexor carpi radialis tenotomy and excisional debridement  of FCR tendon remnants. SURGEON:  Arina Guevara M.D. ANESTHESIA:  1. General.  2.  Local anesthetic by surgeon consisting of approximately 20 mL of  0.25% Marcaine with epinephrine. ASSISTANT:  Ana Garcia DO, Orthopedic Surgery Resident. ESTIMATED BLOOD LOSS:  Minimal.    TOTAL TOURNIQUET TIME:  Approximately one hour at 250 mmHg of brachial  tourniquet. FINDINGS:  1.  Wrist arthroscopy revealed preserved scaphoid, scaphoid facet,  lunate, lunate facet, scapholunate ligament as well as lunotriquetral  ligaments. 2.  There was a central peripheral TFCC tear as well as dorsal ulnar  synovitis and chondromalacia of the ulnar aspect of the lunate. 3.  Status post arthroscopic debridement, all remaining ligaments were  stable.   4.  Exploration of the volar radial aspect of the wrist revealed  scarring and remnants of the FCR tendon, which was completely excised,  preserving the adjacent radial artery. 5.  There did appear to be a dorsal fracture through the previous  arthroplasty at the base of the thumb metacarpal.  This was stable. The  previously placed anchovy was displaced dorsally and ulnarly. The  scaphotrapezoidal joint was inspected and had mild arthritic changes,  therefore partial trapezoidectomy was completed. 6.  Fluoroscopic examination revealed migration of the thumb metacarpal,  but no abutment with the distal scaphoid. However, given the patient's  preoperative symptoms, a revision arthroplasty using a mini TightRope  system was undertaken after mobilization of the soft tissues restoring  and improving axial stability. SPECIMENS:  None. COMPLICATIONS:  None. DISPOSITION:  The patient remained stable throughout the procedure. OPERATIVE INDICATIONS:  The patient is a 68-year-old gentleman with  persistent recalcitrant left wrist symptoms after a traumatic injury. He had a previous arthroplasty of the thumb basal joint with initially  good results, but with recalcitrant and persistent symptoms after an  injury. He also had other symptoms, less severe over the volar radial  aspect of the wrist and dorsal ulnar aspect of the wrist.  After failing  conservative management and after extensive discussion with the patient,  he wished to proceed with surgical intervention. Risks included, but  not limited to risk of infection, damage to nerves, vessels, or tendons,  failure to improve his symptoms or worsening of symptoms, recurrence of  symptoms, persistence of symptoms, scar sensitivity, loss of range of  motion, stiffness, need for therapy and/or additional surgery were  explained. All questions were answered and he would like to proceed. DESCRIPTION OF PROCEDURE:  The patient was identified in the holding  area. The left wrist was identified as the surgical site.   He was then  seen by Anesthesia, taken to the operating room, placed supine on the  table, and underwent general anesthesia per Anesthesia Department. All  bony prominences were well padded. A well-padded arm tourniquet was  placed. The left upper extremity was prepared and draped in the  standard sterile fashion. Arm was exsanguinated. Tourniquet was  inflated to 250 mmHg. Total tourniquet time was approximately one hour. Finger traps were placed in the index and middle fingers. A well-padded  traction tower was placed and 10 to 15 pounds of traction placed across  the carpus. A 3-4 portal was localized with an 18-gauge needle. Joint  was infiltrated with normal saline. A nick incision was made over the  3-4 portal, blunt dissection and insertion of a 2.7 mm arthroscope. The  scaphoid, scaphoid facet, lunate, and lunate facet were well preserved. The scapholunate ligament was intact. The volar radial ligaments were  intact. Ulnarly, there was dorsal ulnar scarring and degenerative  changes, particularly to the periphery of the TFCC. A 6U portal was  localized with an 18-gauge needle, nick incision, blunt dissection, and  insertion of an arthroscopic shaver. Arthroscopic shaver was used to  debride the dorsal synovitis from dorsal ulnar extending ulnarly. Gentle debridement then revealed a central tear to the TFCC, which was  debrided to stable margins. The remaining ligamentous structures were  stable. With this completed, the scope was removed. Attention was  directed towards the volar radial aspect of the wrist.    Finger traps were removed. Traction tower was removed. An incision  over the volar radial aspect of the wrist was then created followed by  blunt dissection. The radial artery was identified and preserved. Hemostasis was achieved with bipolar cautery. There was dense scar  tissue traced into the FCR tunnel. There was scarring and what was  considered to be remnants of the FCR tendon at this level, which were  completely excised.   With this completed, attention was directed towards  the thumb basal joint. Fluoroscopy was then brought in.  AP, lateral, and gentle stress  maneuvers revealed axial longitudinal instability to the thumb  metacarpal, but did not have any significant impingement on the distal  scaphoid. A dorsal incision was then made over the first extensor compartment. There was dense scar tissue encountered. The radial sensory nerve  branches were significantly within the scar tissue. These were  meticulously dissected out and a partial neurectomy of the volar radial  branch of the radial sensory nerve was undertaken allowing to proximally  retract. The first compartment tendons were then fully mobilized  proximally and distally to allow dorsal access to the thumb CMC joint. The radial artery was identified proximally and preserved. There was a  large anchovy'd tendon present within the dorsal soft tissues consistent  with his previous arthroplasty. The Ethibond sutures were removed and  this tendon preserved. The previous trapeziectomy site was inspected  and debrided of any further scar tissue preserving the previously  anchovy'd tendon. The scaphotrapezoidal joint was inspected. There was  mild arthritic changes. An osteotome was then used to complete a  partial trapezoidectomy removing approximately 2 mm of the proximal  trapezoid. With this completed, fluoroscopy was then brought in. This  was used to place a guide pin for the Arthrex mini TightRope placed at  the base of the thumb metacarpal traversing it into the base of the  second metacarpal and the proximal metaphyseal region. This was  confirmed under fluoroscopy in good position in both the AP and lateral  planes. This was then used to shuttle the mini TightRope with the  button over the base of the thumb metacarpal.  Longitudinal traction and  abduction force was applied and a preliminary suture tied.   This was  then confirmed to have good stability and not over tightened, such that  the palm could be placed flat on the operative table. With this  confirmed, the button was further tied down through a small incision  over the dorsal ulnar aspect of the index metacarpal, which had  previously been created for passage of the pin protecting the underlying  extensor tendons. With this in good position, the SutureTapes were cut. Of note, the previous kandice did appear to have a fracture through the  dorsal cortex. This could either have been at the time of surgery or as  a result of his previously described trauma. However, there did not  appear to be any residual fracture instability. The wounds were  thoroughly and copiously irrigated out. The tourniquet was deflated. Meticulous hemostasis was achieved with bipolar electrocautery. Local  anesthetic infiltrated into the soft tissues. Skin closed with nylon  and layered suture closures. A bulky sterile dressing and thumb spica  splint was applied.         Calista Halsted, MD    D: 05/12/2021 15:54:33       T: 05/12/2021 16:01:16     [email or web address]  Job#: [id number]     Doc#: [id number]    CC:

## 2021-05-19 ENCOUNTER — TELEPHONE (OUTPATIENT)
Dept: ORTHOPEDIC SURGERY | Age: 71
End: 2021-05-19

## 2021-05-19 DIAGNOSIS — M18.12 ARTHRITIS OF CARPOMETACARPAL (CMC) JOINT OF LEFT THUMB: Primary | ICD-10-CM

## 2021-05-20 ENCOUNTER — OFFICE VISIT (OUTPATIENT)
Dept: ORTHOPEDIC SURGERY | Age: 71
End: 2021-05-20

## 2021-05-20 VITALS — RESPIRATION RATE: 18 BRPM | WEIGHT: 168 LBS | HEIGHT: 69 IN | BODY MASS INDEX: 24.88 KG/M2

## 2021-05-20 DIAGNOSIS — M18.12 ARTHRITIS OF CARPOMETACARPAL (CMC) JOINT OF LEFT THUMB: ICD-10-CM

## 2021-05-20 DIAGNOSIS — M65.4 TENOSYNOVITIS, DE QUERVAIN: Primary | ICD-10-CM

## 2021-05-20 PROCEDURE — 99024 POSTOP FOLLOW-UP VISIT: CPT | Performed by: ORTHOPAEDIC SURGERY

## 2021-05-20 RX ORDER — HYDROCODONE BITARTRATE AND ACETAMINOPHEN 5; 325 MG/1; MG/1
1 TABLET ORAL EVERY 6 HOURS PRN
Qty: 20 TABLET | Refills: 0 | Status: SHIPPED | OUTPATIENT
Start: 2021-05-20 | End: 2021-05-25

## 2021-05-20 RX ORDER — GABAPENTIN 100 MG/1
300 CAPSULE ORAL NIGHTLY
Qty: 90 CAPSULE | Refills: 0 | Status: SHIPPED
Start: 2021-05-20 | End: 2021-06-15 | Stop reason: SDUPTHER

## 2021-05-20 NOTE — PROGRESS NOTES
8 days postop revision thumb basal joint arthroplasty with use of Arthrex mini tight rope system as well as concurrent wrist arthroscopy and debridement of peripheral/central tear of TFCC. Overall he is doing well. No complaints. Physical exam: Incisions healing nicely. No signs of infection. Opposition is to the tip of the little finger. Remains neurovascularly intact. X-rays in the office today: AP lateral obliques of his left hand demonstrate resection arthroplasty of the trapezium and partial trapezoid with stable buttons for the Arthrex mini tight rope system. No new fractures or dislocations. Impression office x-rays: Stable resection arthroplasty of thumb basal joint with stable hardware for the Arthrex mini tight rope      Assessment: 8 days postop left thumb revision resection arthroplasty with mini tight rope system. Also with concurrent wrist arthroscopy and debridement and peripheral/central TFCC tear and excision of FCR volar radial wrist    Plan    Patient is referred to therapy for splint fabrication. Avoid forearm rotation for 4 weeks. May incorporate forearm rotation at that time. Avoid thumb basal joint range of motion for 4 weeks. Okay to incorporate scar management wrist flexion extension and digital range of motion and thumb IP motion. Follow-up office 4 weeks. Patient still complaining of pain and difficulty sleeping. Will start gabapentin in addition to his Norco.      Informed consent was obtained for continued opioid treatment.  Patient agrees to continue the current treatment and agrees the benefits of opioid treatment ( decreased pain, improved function) continue to outweigh the potential risks ( confusion, change in thinking ability, depression, dry mouth, nausea, constipation, vomiting, impaired coordination/balance, sleepiness, damage liver or kidneys, opioid-induced hyperalgesia, physical dependence, addiction, withdrawal, respiratory depression and even death).

## 2021-05-25 NOTE — PROGRESS NOTES
OCCUPATIONAL THERAPY INITIAL EVALUATION    [address] ANCILL[facility] SERVICES  [facility] OCCUPATIONAL THERAPY   [place] [facility] [address]  Dept: [address] OT Fax: [phone]    Date:  2021  Initial Evaluation Date: 21   Evaluating Therapist: Beryl Hendrickson OT/ L    Patient Name:  Fortino Phelps. :  1950    Restrictions/Precautions:  See specifics listed below, Low fall risk  Diagnosis:    M65.4 (ICD-10-CM) - Tenosynovitis, de Quervain   M18.12 (ICD-10-CM) - Arthritis of carpometacarpal (CMC) joint of left thumb   Date of Surgery/Injury: 21 L thumb CMC arthroplasty revision, L FCR tenotomy/ debridement, L wrist arthropathy  Insurance/Certification information:  [facility] [id number]  Plan of care signed (Y/N): N  Visit# / total visits:  approved visits ( 1-15-21 to 21)    Referring Practitioner:  Dr Souleymane Merritt  Specific Practitioner Orders:  Avoid forearm rotation for 4 weeks. May incorporate forearm rotation at that time. Avoid thumb basal joint range of motion for 4 weeks. Okay to incorporate scar management wrist flexion extension and digital range of motion and thumb IP motion.      Assessment of current deficits   [] Functional mobility   [x] ADLs  [x] Strength   [] Cognition   [] Functional transfers   [x] IADLs  [] Safety Awareness  [] Endurance   [x] Fine Motor Coordination  [] Balance  [] Vision/perception  [x] Sensation    [] Gross Motor Coordination [x] ROM  [x] Pain   [x] Edema    [x] Scar Adhesion/Skin Integrity     OT PLAN OF CARE   OT POC based on physician orders, patient diagnosis and results of clinical assessment    Frequency/Duration: 3x/ week x 6 weeks  Specific OT Treatment to include:     [x] Instruction in HEP                   Modalities:  [x] Therapeutic Exercise        [x] Ultrasound               [] Electrical Stimulation/Attended  [x] PROM/Stretching                    [x] Fluidotherapy          [x]  Paraffin [x] AAROM  [x] AROM                 [] Iontophoresis:   [x] Tendon Glides                                               [] Neuromuscular Re-Ed            [] ADL/IADL re-training    [x] Therapeutic Activity       [x] Pain Management with/without modalities PRN                 [x] Manual Therapy                      [x] Splinting                      [x] Scar Management                   []Joint Protection/Training  []Ergonomics                             [] Joint Mobilization        [] Adaptive Equipment Assessment/Training                             [x] Manual Edema Mobilization   [] Myofascial Release                 [] Energy Conservation/Work Simplification  [x] FM Coordination       [] Safety retraining/education per  individual diagnosis/goals  [x] Desensitization       Patient Specific Goal: \" get my hand back\"                             GOALS (Long term same as Short term):  1) Patient will demonstrate good understanding of home program (exercises/activities/diagnosis/prognosis/goals) with good accuracy. 2) Patient will demonstrate increased active/passive range of motion of their left thumb / Dollyh Greaser WFLs for ADL/IADL completion. 3) Patient will demonstrate /pinch strength of at least 40 / 5 pinch pounds of their left hand. 4) Patient to report decreased pain in their affected left distal  upper extremity from 9/10 to 3/10 or less with resistive functional use. 5) Increase in fine motor function as evidenced by time to complete 9-hole peg test by < 30  Seconds and improved ability to perform fasteners. 6) Patient to report a decrease in hypersensitivity from 100% to 20% or less in their left thumb/ wrist.   7) Patient to demonstrate decreased guarding of their affected extremity from 100% to 20% or less. 8) Patient will demonstrate a non-tender/non-adherent scar.    9) Patient will report ADL/ IADL  functions as Mod I/I using the affected left hand/ wrist.   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.  A th5th thgthrthathdthethrth was upset and the patient inadvertently got hit in the left hand with a mop handle. [place] went to the emergency department where x-rays were obtained. [place] was found to have a contusion of the left thumb.  He was placed in a brace.  He followed up with Hyun Chu started therapy. [place] was in a brace for about 2 months. [place] did see Dr. Linda Baker last year who recommended a cortisone injection this never was followed through. [place] has tried Voltaren gel in the past which did help. [place] is currently out of Voltaren gel. Patient did have an MRI of his left wrist and left hand which demonstrates subsidence of his previous trapeziectomy with ligament reconstruction. Pt now presents after additional surgery as follows:    DATE OF PROCEDURE:  05/12/2021     PREOPERATIVE DIAGNOSES:  1. Left thumb carpometacarpal arthritis, status post previous thumb  basal joint arthroplasty. 2.  Left wrist flexor carpi radialis tendonitis. 3.  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     PROCEDURES PERFORMED:  1. Left wrist arthroscopy with debridement of dorsal ulnar synovitis  and central TFCC tear. 2.  Left thumb revision carpometacarpal arthroplasty with application of  Arthrex mini TightRope system. 3.  Left thumb partial trapezoidectomy. 4.  Left wrist flexor carpi radialis tenotomy and excisional debridement  of FCR tendon remnants. Pt presents today with cc: of thumb/ wrist pain and swelling in the hand. Pt is in a temporary thumb spica. Each surgical site is well healed with mild diffuse swelling. Palpation to the thumb and wrist shows hypersensitivity.      Home Living:   Prior Level of Function: Independent    Cognition:   Alert/Oriented x3     IADL STATUS:   Ind Mod I Min A Mod A Max A Dep Other   Homemaking Responsibility    x      Shopping Responsibility:    x      Mode of Transportation:  x     car   Leisure & Hobbies: x    Work:      x On leave     Comments:Pt has not been active since his surgery. ADL STATUS:   Ind Mod I Min A Mod A Max A Dep Other   Feeding:  x        Grooming:  x        Bathing:    x      UE Dressing:   x       LE Dressing:   x       Toileting:  x        Transfers: x           Comments: Pt requires help from his spouse for self care. t reports doing all tasks with the right arm only. The left hand is dependent at this time. Pain Level: 9 on scale of 1-10, aching, throbbing, tight (pulling) and uncomfortable in the left thumb/ wrist    UE Assessment: RHD    Left wrist/ hand AROM: as permitted by MD orders  Wrist flexion 0-25  Wrist extension- to neutral only  Thumb IP- 0-30  L hand DPC (cm): index/ -3, middle/-2, ring/-3 and small/ -4    Comment: All AROM is tight and uncomfortable    Sensation: Left hand  Able To Sense (Y) / Unable to Sense (N)  SEMMES-CORRINA Thumb 2nd Digit 3rd Digit  4th Digit  5th Digit    Normal Touch  Size: 2.83  x x x x   Diminished Light Touch   Size: 3.61 X  Tip only       Diminished Protective Sense  Size: 4.31        Loss of Protective Sense   Size: 4.56        Loss of Sensation  Size: 6.65            Dynamometer (setting 2): TBA         Pinch Meter: TBA     9 Hole Peg Test: TBA     QuickDASH Score: 82% disability    Intervention: Tx completed with fabrication of a custom thumb spica splint. Stockinette provided underneath for comfort and material used has holes for ventilation. Care taken to assure the thumb IP is free for movement. Pt tolerated splinting well. Pt is to wear the splint at all times except for hygiene and for ROM as permitted. Care of the splint and precautions were discussed. Pt was encouraged to perform frequent AROM to his left hand digits from the index into the small finger to help decrease hand swelling and to maintain digitial ROM. Home AROM at the IP and gentle wrist flexion/ extension was also encouraged at 2x/ day to start.  Pt was cautioned

## 2021-05-26 ENCOUNTER — EVALUATION (OUTPATIENT)
Dept: OCCUPATIONAL THERAPY | Age: 71
End: 2021-05-26
Payer: COMMERCIAL

## 2021-05-26 DIAGNOSIS — M18.12 ARTHRITIS OF CARPOMETACARPAL (CMC) JOINT OF LEFT THUMB: Primary | ICD-10-CM

## 2021-05-26 DIAGNOSIS — M65.4 TENOSYNOVITIS, DE QUERVAIN: ICD-10-CM

## 2021-05-26 PROCEDURE — 97166 OT EVAL MOD COMPLEX 45 MIN: CPT | Performed by: OCCUPATIONAL THERAPIST

## 2021-05-26 PROCEDURE — 97760 ORTHOTIC MGMT&TRAING 1ST ENC: CPT | Performed by: OCCUPATIONAL THERAPIST

## 2021-05-28 ENCOUNTER — TREATMENT (OUTPATIENT)
Dept: OCCUPATIONAL THERAPY | Age: 71
End: 2021-05-28
Payer: COMMERCIAL

## 2021-05-28 DIAGNOSIS — S60.222A CONTUSION OF LEFT HAND, INITIAL ENCOUNTER: ICD-10-CM

## 2021-05-28 DIAGNOSIS — M65.4 TENOSYNOVITIS, DE QUERVAIN: ICD-10-CM

## 2021-05-28 DIAGNOSIS — M18.12 ARTHRITIS OF CARPOMETACARPAL (CMC) JOINT OF LEFT THUMB: Primary | ICD-10-CM

## 2021-05-28 PROCEDURE — 97110 THERAPEUTIC EXERCISES: CPT | Performed by: OCCUPATIONAL THERAPIST

## 2021-05-28 PROCEDURE — 97035 APP MDLTY 1+ULTRASOUND EA 15: CPT | Performed by: OCCUPATIONAL THERAPIST

## 2021-05-28 PROCEDURE — 97140 MANUAL THERAPY 1/> REGIONS: CPT | Performed by: OCCUPATIONAL THERAPIST

## 2021-05-28 NOTE — PROGRESS NOTES
OCCUPATIONAL THERAPY PROGRESS NOTE    Date:  2021                          Initial Evaluation Date: 2021    Patient Name:  Kavita Foss. :  1950    Restrictions/Precautions:  See specifics listed below, Low fall risk  Diagnosis:    M65.4 (ICD-10-CM) - Tenosynovitis, de Quervain   M18.12 (ICD-10-CM) - Arthritis of carpometacarpal (CMC) joint of left thumb   Date of Surgery/Injury: 21 L thumb CMC arthroplasty revision, L FCR tenotomy/ debridement, L wrist arthropathy    ( 2 weeks post op)  Insurance/Certification information:  [facility]  Plan of care signed (Y/N): N  Visit# / total visits:  approved visits ( 1-15-21 to 21)     Referring Practitioner:  Dr Zachariah Anthony  Specific Practitioner Orders:  Avoid forearm rotation for 4 weeks. [facility] incorporate forearm rotation at that time.  Avoid thumb basal joint range of motion for 4 weeks. Jonna Schmidt to incorporate scar management wrist flexion extension and digital range of motion and thumb IP motion.      Assessment of current deficits   []? Functional mobility             [x]? ADLs          [x]? Strength                  []? Cognition   []? Functional transfers           [x]? IADLs         []? Safety Awareness  []? Endurance   [x]? Fine Motor Coordination    []? Balance      []? Vision/perception   [x]? Sensation     []? Gross Motor Coordination [x]? ROM           [x]? Pain                        [x]? Edema          [x]? Scar Adhesion/Skin Integrity      OT PLAN OF CARE   OT POC based on physician orders, patient diagnosis and results of clinical assessment     Frequency/Duration: 3x/ week x 6 weeks  Specific OT Treatment to include:      [x]? Instruction in HEP                   Modalities:  [x]?  Therapeutic Exercise                 [x]? Ultrasound               []? Electrical Stimulation/Attended  [x]? PROM/Stretching                    [x]? Fluidotherapy          [x]?   Paraffin                   [x]? AAROM  [x]?  AROM   []? Iontophoresis:   [x]? Gustavo Gracia                                       []? Neuromuscular Re-Ed            []? ADL/IADL re-training    [x]?  Therapeutic Activity                  [x]? Pain Management with/without modalities PRN                 [x]?  Manual Therapy                      [x]? Splinting                                   [x]? Scar Management                   []?Joint Protection/Training  []? Ergonomics                             []? Joint Mobilization                      []? Adaptive Equipment Assessment/Training                             [x]?  Manual Edema Mobilization   []? Myofascial Release                 []? Energy Conservation/Work Simplification  [x]? FM Coordination           []? Safety retraining/education per  individual diagnosis/goals  [x]? Desensitization                                   GOALS (Long term same as Short term):  1) Patient will demonstrate good understanding of home program (exercises/activities/diagnosis/prognosis/goals) with good accuracy. 2) Patient will demonstrate increased active/passive range of motion of their left thumb / Janette Demark WFLs for ADL/IADL completion. 3) Patient will demonstrate /pinch strength of at least 40 / 5 pinch pounds of their left hand. 4) Patient to report decreased pain in their affected left distal  upper extremity from 9/10 to 3/10 or less with resistive functional use. 5) Increase in fine motor function as evidenced by time to complete 9-hole peg test by < 30  Seconds and improved ability to perform fasteners. 6) Patient to report a decrease in hypersensitivity from 100% to 20% or less in their left thumb/ wrist.   7) Patient to demonstrate decreased guarding of their affected extremity from 100% to 20% or less. 8) Patient will demonstrate a non-tender/non-adherent scar.    9) Patient will report ADL/ IADL  functions as Mod I/I using the affected left hand/ wrist.   10) Patient will decrease QuickDASH score to 30% or less for increased participation in daily functional activities. Pain Level:    8/10 - at all times achy, wrist dorsally and entire thumb area. Pt using heat and or ice at home and pain meds as needed    Subjective: \"My hand hurts all the time- around the thumb and the back of the wrist.  I take my pain meds as I need them and am using ice. \"     Objective:  Updated POC to be completed by 6/26/2021. INTERVENTION: COMPLETED: SPECIFICS/COMMENTS:   Modality:     US  X 8 min To wrist dorsally 4 min and thumb thenar area 4 min. 3.3 MhZ, 20%, intensity . 08    MHP X- 5 min For soft tissue warm -up and to decrease pain in L hand/thumb        AROM:          Digital tendon glinding X 10 rep's each MP flex/ext,  Hook fist around red sponge wrist in neutral, then full fist- added to HEP   L thumb IP flex/ext X - 15 rep's  After doing gentle PROM             AAROM:     Wrist flex/ext x Wrist in neutral - therapist assisting wrist flex to about 25* and ext to about 5*- slight ^ in pain             PROM/Stretching:     L thumb IP flexion X- 15 rep's  Before doing AROM IP flexion             Scar Mass/Edema Control:     Sacr and retrograde x Tender all scar areas. Mesh compression sleeve  x Fitted with on thumb to decrease pain and edema as needed. Pt does not have to wear if ^'es pain. Strengthening:               Other:     HEP  x Reviewed all and given handout - see attached. Wrist flex/ext, thumb IP flex splint on and digital exer. -see above. Assessment/Comments: Pt is making Good progress toward stated plan of care. Pt appeared to understand all instructions. Pt tolerated session with no ^ in pain and slight decrease ( from 8 - 6/10). Will progress per Dr's orders as tolerated.    -Rehab Potential: Good  -Requires OT Follow Up: Yes  Time In:  9:00 am            Time Out: 10:00 am              Visit #: 2    Treatment Charges: Mins Units   Modalities- US 8 1   Ther Exercise 17 1 Manual Therapy 30 2   Thera Activities     ADL/Home Mgt      Neuro Re-education     Gait Training     Group Therapy     Non-Billable Service Time- MHP 5 0   Other     Total Time/Units 55 4       -Response to Treatment: Pt frustrated with his pain - therapist reviewed pain management techniques - heat, ice, elevation, gentle ROM - to cont with as needed. Therapist provided support. Goals: Goals for pt can be see on initial eval occurring on 5/26/21    Plan:   [x]  Continues Plan of care: Treatment covered based on POC and graduated to patient's progress. Pt education continues at each visit to obtain maximum benefits from skilled OT intervention.   []  Alter Plan of care:   []  Discharge:      Bud Corley, OT OT/L, CHT

## 2021-06-02 ENCOUNTER — TREATMENT (OUTPATIENT)
Dept: OCCUPATIONAL THERAPY | Age: 71
End: 2021-06-02
Payer: COMMERCIAL

## 2021-06-02 DIAGNOSIS — M18.12 ARTHRITIS OF CARPOMETACARPAL (CMC) JOINT OF LEFT THUMB: Primary | ICD-10-CM

## 2021-06-02 DIAGNOSIS — M65.4 TENOSYNOVITIS, DE QUERVAIN: ICD-10-CM

## 2021-06-02 DIAGNOSIS — S60.222A CONTUSION OF LEFT HAND, INITIAL ENCOUNTER: ICD-10-CM

## 2021-06-02 DIAGNOSIS — M77.8 LEFT WRIST TENDINITIS: ICD-10-CM

## 2021-06-02 PROCEDURE — 97110 THERAPEUTIC EXERCISES: CPT | Performed by: OCCUPATIONAL THERAPIST

## 2021-06-02 PROCEDURE — 97140 MANUAL THERAPY 1/> REGIONS: CPT | Performed by: OCCUPATIONAL THERAPIST

## 2021-06-02 PROCEDURE — 97035 APP MDLTY 1+ULTRASOUND EA 15: CPT | Performed by: OCCUPATIONAL THERAPIST

## 2021-06-02 RX ORDER — HYDROCODONE BITARTRATE AND ACETAMINOPHEN 7.5; 325 MG/1; MG/1
1 TABLET ORAL EVERY 6 HOURS PRN
Qty: 28 TABLET | Refills: 0 | Status: SHIPPED | OUTPATIENT
Start: 2021-06-02 | End: 2021-06-09

## 2021-06-02 NOTE — PROGRESS NOTES
OCCUPATIONAL THERAPY PROGRESS NOTE    Date:  2021                          Initial Evaluation Date: 2021    Patient Name:  Jimmie Fry. :  1950    Restrictions/Precautions:  See specifics listed below, Low fall risk  Diagnosis:    M65.4 (ICD-10-CM) - Tenosynovitis, de Quervain   M18.12 (ICD-10-CM) - Arthritis of carpometacarpal (CMC) joint of left thumb   Date of Surgery/Injury: 21 L thumb CMC arthroplasty revision, L FCR tenotomy/ debridement, L wrist arthropathy    ( 3 weeks post op)  Insurance/Certification information:  [facility]  Plan of care signed (Y/N): N  Visit# / total visits: 3 / 18 approved visits ( 1-15-21 to 21)     Referring Practitioner:  Dr Josephine West  Specific Practitioner Orders:  Avoid forearm rotation for 4 weeks. [facility] incorporate forearm rotation at that time.  Avoid thumb basal joint range of motion for 4 weeks. Nancy Bare to incorporate scar management wrist flexion extension and digital range of motion and thumb IP motion.      Assessment of current deficits   []? Functional mobility             [x]? ADLs          [x]? Strength                  []? Cognition   []? Functional transfers           [x]? IADLs         []? Safety Awareness  []? Endurance   [x]? Fine Motor Coordination    []? Balance      []? Vision/perception   [x]? Sensation     []? Gross Motor Coordination [x]? ROM           [x]? Pain                        [x]? Edema          [x]? Scar Adhesion/Skin Integrity      OT PLAN OF CARE   OT POC based on physician orders, patient diagnosis and results of clinical assessment     Frequency/Duration: 3x/ week x 6 weeks  Specific OT Treatment to include:      [x]? Instruction in HEP                   Modalities:  [x]?  Therapeutic Exercise                 [x]? Ultrasound               []? Electrical Stimulation/Attended  [x]? PROM/Stretching                    [x]? Fluidotherapy          [x]?   Paraffin                   [x]? AAROM  [x]?  AROM   []? Iontophoresis:   [x]? Eliz Fernández                                       []? Neuromuscular Re-Ed            []? ADL/IADL re-training    [x]?  Therapeutic Activity                  [x]? Pain Management with/without modalities PRN                 [x]?  Manual Therapy                      [x]? Splinting                                   [x]? Scar Management                   []?Joint Protection/Training  []? Ergonomics                             []? Joint Mobilization                      []? Adaptive Equipment Assessment/Training                             [x]?  Manual Edema Mobilization   []? Myofascial Release                 []? Energy Conservation/Work Simplification  [x]? FM Coordination           []? Safety retraining/education per  individual diagnosis/goals  [x]? Desensitization                                   GOALS (Long term same as Short term):  1) Patient will demonstrate good understanding of home program (exercises/activities/diagnosis/prognosis/goals) with good accuracy. 2) Patient will demonstrate increased active/passive range of motion of their left thumb / Nghia Cooper WFLs for ADL/IADL completion. 3) Patient will demonstrate /pinch strength of at least 40 / 5 pinch pounds of their left hand. 4) Patient to report decreased pain in their affected left distal  upper extremity from 9/10 to 3/10 or less with resistive functional use. 5) Increase in fine motor function as evidenced by time to complete 9-hole peg test by < 30  Seconds and improved ability to perform fasteners. 6) Patient to report a decrease in hypersensitivity from 100% to 20% or less in their left thumb/ wrist.   7) Patient to demonstrate decreased guarding of their affected extremity from 100% to 20% or less. 8) Patient will demonstrate a non-tender/non-adherent scar.    9) Patient will report ADL/ IADL  functions as Mod I/I using the affected left hand/ wrist.   10) Patient will decrease QuickDASH score

## 2021-06-02 NOTE — TELEPHONE ENCOUNTER
Patient is requesting a medication refill, OARRS complete. Patient is 3 weeks out from surgery of Left wrist arthroscopy with debridement of dorsal ulnar synovitis and central TFCC tear, Left thumb revision carpometacarpal arthroplasty with application of  Arthrex mini TightRope system. Patient was last seen on 5-20-21 and patients next appointment is 6-15-21. Patient was last given Norco 5-325 q6hr PRN on 5-20-21. Patient called stating Rashad Dominguez not helping, dosage changed to Norco 7.5-325 q6hr PRN and education provided regarding how often he can take medication.

## 2021-06-04 ENCOUNTER — TREATMENT (OUTPATIENT)
Dept: OCCUPATIONAL THERAPY | Age: 71
End: 2021-06-04
Payer: COMMERCIAL

## 2021-06-04 DIAGNOSIS — M65.4 TENOSYNOVITIS, DE QUERVAIN: ICD-10-CM

## 2021-06-04 DIAGNOSIS — M18.12 ARTHRITIS OF CARPOMETACARPAL (CMC) JOINT OF LEFT THUMB: Primary | ICD-10-CM

## 2021-06-04 DIAGNOSIS — S60.222A CONTUSION OF LEFT HAND, INITIAL ENCOUNTER: ICD-10-CM

## 2021-06-04 PROCEDURE — 97035 APP MDLTY 1+ULTRASOUND EA 15: CPT | Performed by: OCCUPATIONAL THERAPIST

## 2021-06-04 PROCEDURE — 97110 THERAPEUTIC EXERCISES: CPT | Performed by: OCCUPATIONAL THERAPIST

## 2021-06-04 PROCEDURE — 97140 MANUAL THERAPY 1/> REGIONS: CPT | Performed by: OCCUPATIONAL THERAPIST

## 2021-06-04 NOTE — PROGRESS NOTES
OCCUPATIONAL THERAPY PROGRESS NOTE    Date:  2021                          Initial Evaluation Date: 2021    Patient Name:  Austin Mari. :  1950    Restrictions/Precautions:  See specifics listed below, Low fall risk  Diagnosis:    M65.4 (ICD-10-CM) - Tenosynovitis, de Quervain   M18.12 (ICD-10-CM) - Arthritis of carpometacarpal (CMC) joint of left thumb   Date of Surgery/Injury: 21 L thumb CMC arthroplasty revision, L FCR tenotomy/ debridement, L wrist arthropathy    ( 3 weeks post op)  Insurance/Certification information:  [facility]  Plan of care signed (Y/N): N  Visit# / total visits:  approved visits ( 1-15-21 to 21)     Referring Practitioner:  Dr Crys Warren  Specific Practitioner Orders:  Avoid forearm rotation for 4 weeks. [facility] incorporate forearm rotation at that time.  Avoid thumb basal joint range of motion for 4 weeks. Katelyn Duarte to incorporate scar management wrist flexion extension and digital range of motion and thumb IP motion.      Assessment of current deficits   []? Functional mobility             [x]? ADLs          [x]? Strength                  []? Cognition   []? Functional transfers           [x]? IADLs         []? Safety Awareness  []? Endurance   [x]? Fine Motor Coordination    []? Balance      []? Vision/perception   [x]? Sensation     []? Gross Motor Coordination [x]? ROM           [x]? Pain                        [x]? Edema          [x]? Scar Adhesion/Skin Integrity      OT PLAN OF CARE   OT POC based on physician orders, patient diagnosis and results of clinical assessment     Frequency/Duration: 3x/ week x 6 weeks  Specific OT Treatment to include:      [x]? Instruction in HEP                   Modalities:  [x]?  Therapeutic Exercise                 [x]? Ultrasound               []? Electrical Stimulation/Attended  [x]? PROM/Stretching                    [x]? Fluidotherapy          [x]?   Paraffin                   [x]? AAROM  [x]?  AROM   []? Iontophoresis:   [x]? Mikel Hatfield                                       []? Neuromuscular Re-Ed            []? ADL/IADL re-training    [x]?  Therapeutic Activity                  [x]? Pain Management with/without modalities PRN                 [x]?  Manual Therapy                      [x]? Splinting                                   [x]? Scar Management                   []?Joint Protection/Training  []? Ergonomics                             []? Joint Mobilization                      []? Adaptive Equipment Assessment/Training                             [x]?  Manual Edema Mobilization   []? Myofascial Release                 []? Energy Conservation/Work Simplification  [x]? FM Coordination           []? Safety retraining/education per  individual diagnosis/goals  [x]? Desensitization                                   GOALS (Long term same as Short term):  1) Patient will demonstrate good understanding of home program (exercises/activities/diagnosis/prognosis/goals) with good accuracy. 2) Patient will demonstrate increased active/passive range of motion of their left thumb / Magda Dodrill WFLs for ADL/IADL completion. 3) Patient will demonstrate /pinch strength of at least 40 / 5 pinch pounds of their left hand. 4) Patient to report decreased pain in their affected left distal  upper extremity from 9/10 to 3/10 or less with resistive functional use. 5) Increase in fine motor function as evidenced by time to complete 9-hole peg test by < 30  Seconds and improved ability to perform fasteners. 6) Patient to report a decrease in hypersensitivity from 100% to 20% or less in their left thumb/ wrist.   7) Patient to demonstrate decreased guarding of their affected extremity from 100% to 20% or less. 8) Patient will demonstrate a non-tender/non-adherent scar.    9) Patient will report ADL/ IADL  functions as Mod I/I using the affected left hand/ wrist.   10) Patient will decrease QuickDASH score to 30% or less for increased participation in daily functional activities. Pain Level:    5 - 8/10 - at all times achy, wrist dorsally and entire thumb area. ^'ed at night - to 8/10-  throbbing. Pt using heat and or ice at home and pain meds as needed    Subjective: \"My thumb was really hurting last night - like a 8/10. I try ice and heat but it still hurt. \"     Objective:  Updated POC to be completed by 6/26/2021. INTERVENTION: COMPLETED: SPECIFICS/COMMENTS:   Modality:     US  X 8 min To wrist dorsally 4 min and thumb thenar area 4 min. 3.3 MhZ, 20%, intensity . 08    MHP X- 5 min For soft tissue warm -up and to decrease pain in L hand/thumb- sterile salt water soak to encourage steri strips to come off. AROM:          Digital tendon glinding X 10 rep's each MP flex/ext,  Hook fist around red sponge wrist in neutral, then full fist-   L thumb IP flex/ext X - 15 rep's  After doing gentle PROM             AAROM:     Wrist flex/ext x Wrist in neutral GE- therapist assisting wrist flex to about 25* and ext to about 5*- slight ^ in pain- not doing at home. PROM/Stretching:     L thumb IP flexion X- 15 rep's  Before doing AROM IP flexion             Scar Mass/Edema Control:     Sacr and retrograde x Tender all scar areas. Mesh compression sleeve  x Fitted with on thumb to decrease pain and edema as needed. Pt does not have to wear if ^'es pain. / also fitted with size E compression sleeve doubled around wrist to decrease pain and edema - wear as needed   Wound care x Saline soak with MHP to aide with steri strip removal   Strengthening:               Other:     HEP  x Reviewed all and given handout - see attached. Splint on and digital exer. And thumb IP flex/ext-see above. Assessment/Comments: Pt is making Good progress toward stated plan of care. Pt continues to have ^'ed pain around this thumb ( [address] [address] area) and dorsum of the hand. He is using heat and ice at home.   Therapist has him trying another compression sleeve to decrease pain - does not have to wear it if it ^'ed pain. Gentle PROM performed to the wrist and digits-tolerated well. Protocol to advance next week with forearm rotation and basal joint motion of the thumb- Will progress per Dr's orders as tolerated. -Rehab Potential: Good  -Requires OT Follow Up: Yes  Time In:  9:00 am            Time Out: 9:55  am              Visit #: 4    Treatment Charges: Mins Units   Modalities- US 8 1   Ther Exercise 17 1   Manual Therapy 30 2   Thera Activities     ADL/Home Mgt      Neuro Re-education     Gait Training     Group Therapy     Non-Billable Service Time- [facility] 5 0   Other     Total Time/Units 55 4       -Response to Treatment: Pt frustrated with his pain - therapist reviewed pain management techniques - heat, ice, compression, elevation, gentle ROM - to cont with as needed. Therapist provided support. Goals: Goals for pt can be see on initial eval occurring on 5/26/21    Plan:   [x]  Continues Plan of care: Treatment covered based on POC and graduated to patient's progress. Pt education continues at each visit to obtain maximum benefits from skilled OT intervention.   []  Alter Plan of care:   []  Discharge:      Shruti Laird, OT/L, CHT

## 2021-06-07 ENCOUNTER — TREATMENT (OUTPATIENT)
Dept: OCCUPATIONAL THERAPY | Age: 71
End: 2021-06-07
Payer: COMMERCIAL

## 2021-06-07 DIAGNOSIS — M65.4 TENOSYNOVITIS, DE QUERVAIN: ICD-10-CM

## 2021-06-07 DIAGNOSIS — S60.222A CONTUSION OF LEFT HAND, INITIAL ENCOUNTER: ICD-10-CM

## 2021-06-07 DIAGNOSIS — M18.12 ARTHRITIS OF CARPOMETACARPAL (CMC) JOINT OF LEFT THUMB: Primary | ICD-10-CM

## 2021-06-07 PROCEDURE — 97018 PARAFFIN BATH THERAPY: CPT | Performed by: OCCUPATIONAL THERAPIST

## 2021-06-07 PROCEDURE — 97110 THERAPEUTIC EXERCISES: CPT | Performed by: OCCUPATIONAL THERAPIST

## 2021-06-07 PROCEDURE — 97140 MANUAL THERAPY 1/> REGIONS: CPT | Performed by: OCCUPATIONAL THERAPIST

## 2021-06-07 NOTE — PROGRESS NOTES
OCCUPATIONAL THERAPY PROGRESS NOTE    Date:  2021                          Initial Evaluation Date: 2021    Patient Name:  Lulu Mohr. :  1950    Restrictions/Precautions:  See specifics listed below, Low fall risk  Diagnosis:    M65.4 (ICD-10-CM) - Tenosynovitis, de Quervain   M18.12 (ICD-10-CM) - Arthritis of carpometacarpal (CMC) joint of left thumb   Date of Surgery/Injury: 21 L thumb CMC arthroplasty revision, L FCR tenotomy/ debridement, L wrist arthropathy    ( 4 weeks post op - in 2 days - this week)  Insurance/Certification information:  [facility] [id number]  Plan of care signed (Y/N): N  Visit# / total visits:  approved visits ( 1-15-21 to 21)     Referring Practitioner:  Dr Sammi Vasquez  Specific Practitioner Orders:  Avoid forearm rotation for 4 weeks. [facility] incorporate forearm rotation at that time.  Avoid thumb basal joint range of motion for 4 weeks. Lannis Nyhan to incorporate scar management wrist flexion extension and digital range of motion and thumb IP motion.      Assessment of current deficits   []? Functional mobility             [x]? ADLs          [x]? Strength                  []? Cognition   []? Functional transfers           [x]? IADLs         []? Safety Awareness  []? Endurance   [x]? Fine Motor Coordination    []? Balance      []? Vision/perception   [x]? Sensation     []? Gross Motor Coordination [x]? ROM           [x]? Pain                        [x]? Edema          [x]? Scar Adhesion/Skin Integrity      OT PLAN OF CARE   OT POC based on physician orders, patient diagnosis and results of clinical assessment     Frequency/Duration: 3x/ week x 6 weeks  Specific OT Treatment to include:      [x]? Instruction in HEP                   Modalities:  [x]?  Therapeutic Exercise                 [x]? Ultrasound               []? Electrical Stimulation/Attended  [x]? PROM/Stretching                    [x]? Fluidotherapy          [x]?   Paraffin                   [x]? AAROM  [x]?  AROM                 []? Iontophoresis:   [x]? Dilma Gey                                       []? Neuromuscular Re-Ed            []? ADL/IADL re-training    [x]?  Therapeutic Activity                  [x]? Pain Management with/without modalities PRN                 [x]?  Manual Therapy                      [x]? Splinting                                   [x]? Scar Management                   []?Joint Protection/Training  []? Ergonomics                             []? Joint Mobilization                      []? Adaptive Equipment Assessment/Training                             [x]?  Manual Edema Mobilization   []? Myofascial Release                 []? Energy Conservation/Work Simplification  [x]? FM Coordination           []? Safety retraining/education per  individual diagnosis/goals  [x]? Desensitization                                   GOALS (Long term same as Short term):  1) Patient will demonstrate good understanding of home program (exercises/activities/diagnosis/prognosis/goals) with good accuracy. 2) Patient will demonstrate increased active/passive range of motion of their left thumb / [place] Peaks WFLs for ADL/IADL completion. 3) Patient will demonstrate /pinch strength of at least 40 / 5 pinch pounds of their left hand. 4) Patient to report decreased pain in their affected left distal  upper extremity from 9/10 to 3/10 or less with resistive functional use. 5) Increase in fine motor function as evidenced by time to complete 9-hole peg test by < 30  Seconds and improved ability to perform fasteners. 6) Patient to report a decrease in hypersensitivity from 100% to 20% or less in their left thumb/ wrist.   7) Patient to demonstrate decreased guarding of their affected extremity from 100% to 20% or less. 8) Patient will demonstrate a non-tender/non-adherent scar.    9) Patient will report ADL/ IADL  functions as Mod I/I using the affected left hand/ wrist.   10) Patient will decrease QuickDASH score to 30% or less for increased participation in daily functional activities. Pain Level:    5 - 8/10 - at all times achy, hand dorsally and entire thumb area. More in the thumb area this date then on the back of the hand. ^'ed at night - to 8/10-  throbbing. Pt using heat and or ice at home and pain meds as needed. Subjective: \"My thumb still hurts - but the back of my hand feels a little less painful. \"     Objective:  Updated POC to be completed by 6/26/2021. INTERVENTION: COMPLETED: SPECIFICS/COMMENTS:   Modality:     US  X 8 min To MP area dorsally 4 min and thumb thenar area 4 min. 3.3 MhZ, 20%, intensity . 08    MHP X- 5 min For soft tissue warm -up and to decrease pain in L hand/thumb- sterile salt water soak to encourage steri strips to come off. AROM:          Digital tendon glinding X 10 rep's each MP flex/ext,  Hook fist  And then full fist.     L thumb IP flex/ext X - 15 rep's  After doing gentle PROM             AAROM:     Wrist flex/ext x Wrist in neutral GE- therapist assisting wrist flex to about 25* and ext  ^ 'ed to about 10*- slight ^ in pain- not doing at home. PROM/Stretching:     L thumb IP flexion X- 15 rep's  Before doing AROM IP flexion             Scar Mass/Edema Control:     Sacr and retrograde x Tender all scar areas. Dycem to thumb scar proximally   Mesh compression sleeve  x Fitted with on thumb to decrease pain and edema as needed. Pt does not have to wear if ^'es pain. / also fitted with size E compression sleeve doubled around wrist to decrease pain and edema - wear as - pt wearing   Wound care  Saline soak with MHP to aide with steri strip removal   Strengthening:               Other:     HEP  x Reviewed all and given handout - see attached. Splint on and digital exer. And thumb IP flex/ext-see above. Assessment/Comments: Pt is making Good progress toward stated plan of care.  Pt continues to have ^'ed pain around this thumb ( [facility]) and dorsum of the hand. He is using heat and ice at home. Gentle PROM performed to the wrist and digits-tolerated well. Protocol to advance next session-  with forearm rotation and basal joint motion of the thumb- Will progress per Dr's orders as tolerated. -Rehab Potential: Good  -Requires OT Follow Up: Yes  Time In:  9:00 am            Time Out: 9:55  am              Visit #: 5    Treatment Charges: Mins Units   Modalities- US 8 1   Ther Exercise 17 1   Manual Therapy 30 2   Thera Activities     ADL/Home Mgt      Neuro Re-education     Gait Training     Group Therapy     Non-Billable Service Time- [facility] 5 0   Other     Total Time/Units 55 4       -Response to Treatment: Pt frustrated with his pain - therapist reviewed pain management techniques - heat, ice, compression, elevation, gentle ROM - to cont with as needed. Therapist provided support. Goals: Goals for pt can be see on initial eval occurring on 5/26/21    Plan:   [x]  Continues Plan of care: Treatment covered based on POC and graduated to patient's progress. Pt education continues at each visit to obtain maximum benefits from skilled OT intervention.   []  Alter Plan of care:   []  Discharge:      Brittney Singleton OT/L, CHT

## 2021-06-09 ENCOUNTER — TREATMENT (OUTPATIENT)
Dept: OCCUPATIONAL THERAPY | Age: 71
End: 2021-06-09
Payer: COMMERCIAL

## 2021-06-09 DIAGNOSIS — M18.12 ARTHRITIS OF CARPOMETACARPAL (CMC) JOINT OF LEFT THUMB: Primary | ICD-10-CM

## 2021-06-09 DIAGNOSIS — M65.4 TENOSYNOVITIS, DE QUERVAIN: ICD-10-CM

## 2021-06-09 PROCEDURE — 97018 PARAFFIN BATH THERAPY: CPT | Performed by: OCCUPATIONAL THERAPIST

## 2021-06-09 PROCEDURE — 97110 THERAPEUTIC EXERCISES: CPT | Performed by: OCCUPATIONAL THERAPIST

## 2021-06-09 PROCEDURE — 97140 MANUAL THERAPY 1/> REGIONS: CPT | Performed by: OCCUPATIONAL THERAPIST

## 2021-06-09 NOTE — PROGRESS NOTES
OCCUPATIONAL THERAPY PROGRESS NOTE    Date:  2021                          Initial Evaluation Date: 2021    Patient Name:  Guru Ko :  1950    Restrictions/Precautions:  See specifics listed below, Low fall risk  Diagnosis:    M65.4 (ICD-10-CM) - Tenosynovitis, de Quervain   M18.12 (ICD-10-CM) - Arthritis of carpometacarpal (CMC) joint of left thumb   Date of Surgery/Injury: 21 L thumb CMC arthroplasty revision, L FCR tenotomy/ debridement, L wrist arthropathy    ( 4 weeks post op today)  Insurance/Certification information:  [facility]  Plan of care signed (Y/N): Y ( thru 21)  Visit# / total visits:  approved visits ( 1-15-21 to 21)     Referring Practitioner:  Dr Elizabeth Zapata  Specific Practitioner Orders:  Avoid forearm rotation for 4 weeks. [facility] incorporate forearm rotation at that time.  Avoid thumb basal joint range of motion for 4 weeks. Zev Spangler to incorporate scar management wrist flexion extension and digital range of motion and thumb IP motion.      Assessment of current deficits   []? Functional mobility             [x]? ADLs          [x]? Strength                  []? Cognition   []? Functional transfers           [x]? IADLs         []? Safety Awareness  []? Endurance   [x]? Fine Motor Coordination    []? Balance      []? Vision/perception   [x]? Sensation     []? Gross Motor Coordination [x]? ROM           [x]? Pain                        [x]? Edema          [x]? Scar Adhesion/Skin Integrity      OT PLAN OF CARE   OT POC based on physician orders, patient diagnosis and results of clinical assessment     Frequency/Duration: 3x/ week x 6 weeks  Specific OT Treatment to include:      [x]? Instruction in HEP                   Modalities:  [x]?  Therapeutic Exercise                 [x]? Ultrasound               []? Electrical Stimulation/Attended  [x]? PROM/Stretching                    [x]? Fluidotherapy          [x]?   Paraffin                   [x]? AAROM  [x]?  AROM                 []? Iontophoresis:   [x]? Guerrero Amabile                                       []? Neuromuscular Re-Ed            []? ADL/IADL re-training    [x]?  Therapeutic Activity                  [x]? Pain Management with/without modalities PRN                 [x]?  Manual Therapy                      [x]? Splinting                                   [x]? Scar Management                   []?Joint Protection/Training  []? Ergonomics                             []? Joint Mobilization                      []? Adaptive Equipment Assessment/Training                             [x]?  Manual Edema Mobilization   []? Myofascial Release                 []? Energy Conservation/Work Simplification  [x]? FM Coordination           []? Safety retraining/education per  individual diagnosis/goals  [x]? Desensitization                                   GOALS (Long term same as Short term):  1) Patient will demonstrate good understanding of home program (exercises/activities/diagnosis/prognosis/goals) with good accuracy. 2) Patient will demonstrate increased active/passive range of motion of their left thumb / [facility] WFLs for ADL/IADL completion. 3) Patient will demonstrate /pinch strength of at least 40 / 5 pinch pounds of their left hand. 4) Patient to report decreased pain in their affected left distal  upper extremity from 9/10 to 3/10 or less with resistive functional use. 5) Increase in fine motor function as evidenced by time to complete 9-hole peg test by < 30  Seconds and improved ability to perform fasteners. 6) Patient to report a decrease in hypersensitivity from 100% to 20% or less in their left thumb/ wrist.   7) Patient to demonstrate decreased guarding of their affected extremity from 100% to 20% or less. 8) Patient will demonstrate a non-tender/non-adherent scar.    9) Patient will report ADL/ IADL  functions as Mod I/I using the affected left hand/ wrist.   10) Patient will decrease QuickDASH score to 30% or less for increased participation in daily functional activities. Pain Level:    8/10 localized to the thenar muscles ad around the [facility] joint. Subjective: \" My thumb still hurts but the rest is a little better\". Objective:  Updated POC to be completed by 6/26/2021. INTERVENTION: COMPLETED: SPECIFICS/COMMENTS:   Modality:     US  X 7 min To MP area dorsally 3 min and thumb thenar area 4 min. 3.3 MhZ, 20%, intensity . 08    Fluidotherapy- L hand/ wrist X- 5 min Completed with gentle AROM as tolerated to increase pain free ROM and promote soft tissue elasticity        AROM:          Digital tendon glinding X 10 rep's each MP flex/ext,  Hook fist  And then full fist.     L thumb IP flex/ext X - 15 rep's  After doing gentle PROM   L thumb AROM all planes x    L forearm rotation x    AAROM:     Wrist flex/ext x Completed as tolerated             PROM/Stretching:     L thumb IP flexion X- 15 rep's  Before doing AROM IP flexion             Scar Mass/Edema Control:     Scar and retrograde x Tender all scar areas. Mesh compression sleeve  x Continue on thumb to decrease pain and edema as needed. Wound care  Saline soak with MHP to aide with steri strip removal   Strengthening:               Other:     HEP   Reviewed all and given handout - see attached. Splint on and digital exer. And thumb IP flex/ext-see above.     x AROM- thumb extension, thumb ABD, IP blocking ex, supination/ pronation, wrist flexion/ extension     Assessment/Comments: Pt is making Good progress toward stated plan of care. ROM continued with the addition of gentle AROM in the forearm and thumb as tolerated. Pt reported stiffness and less pain as Tx progressed.  AROM is as follows:    Radial extension 0-40  Palmar ABD 20-35  Opposition-  intact to index only  Supination 0-30  Pronation 0-60      -Rehab Potential: Good  -Requires OT Follow Up: Yes  Time In:  9:00 am            Time Out: 9:55 am              Visit #: 6    Treatment Charges: Mins Units   Modalities- US/ Fluido 7/ 10 1   Ther Exercise 25 2   Manual Therapy 18 1   Thera Activities     ADL/Home Mgt      Neuro Re-education     Gait Training     Group Therapy     Non-Billable Service Time- [facility]     Other     Total Time/Units 55 4       -Response to Treatment: Pt states he hasn't been doing much with his thumb at home. Thumb, wrist and forearm AROM ex provided for home exercise. Pt was encouraged to soak the hand in warm water during or before exercise and to remember to massage his scars 3x/ day. All exercises provided in writing. Support provided. Goals: Goals for pt can be see on initial eval occurring on 5/26/21    Plan:   [x]  Continues Plan of care: Continue AROM as tolerated, desesensitization , pain mgmt and edema control techniques. Treatment covered based on POC and graduated to patient's progress. Pt education continues at each visit to obtain maximum benefits from skilled OT intervention.   []  [facility] of care:   []  Discharge:      Raffi Franklin OT/JOELLE, [id number]

## 2021-06-15 ENCOUNTER — OFFICE VISIT (OUTPATIENT)
Dept: ORTHOPEDIC SURGERY | Age: 71
End: 2021-06-15

## 2021-06-15 VITALS — WEIGHT: 168 LBS | HEIGHT: 69 IN | RESPIRATION RATE: 18 BRPM | BODY MASS INDEX: 24.88 KG/M2

## 2021-06-15 DIAGNOSIS — M77.8 LEFT WRIST TENDINITIS: ICD-10-CM

## 2021-06-15 DIAGNOSIS — M65.4 TENOSYNOVITIS, DE QUERVAIN: ICD-10-CM

## 2021-06-15 DIAGNOSIS — M18.12 ARTHRITIS OF CARPOMETACARPAL (CMC) JOINT OF LEFT THUMB: Primary | ICD-10-CM

## 2021-06-15 PROCEDURE — 99024 POSTOP FOLLOW-UP VISIT: CPT | Performed by: ORTHOPAEDIC SURGERY

## 2021-06-15 RX ORDER — OXYCODONE HYDROCHLORIDE AND ACETAMINOPHEN 5; 325 MG/1; MG/1
1 TABLET ORAL EVERY 6 HOURS PRN
Qty: 28 TABLET | Refills: 0 | Status: SHIPPED | OUTPATIENT
Start: 2021-06-15 | End: 2021-06-22

## 2021-06-15 RX ORDER — GABAPENTIN 100 MG/1
300 CAPSULE ORAL 3 TIMES DAILY
Qty: 270 CAPSULE | Refills: 0 | Status: SHIPPED
Start: 2021-06-15 | End: 2022-06-06

## 2021-06-15 NOTE — PROGRESS NOTES
5 weeks postop left thumb revision arthroplasty with use of Arthrex mini tight rope as well as wrist arthroscopy with debridement of central TFCC tear. Overall he is doing well. He still complaining scar sensitivity particular of the volar wrist scars as well as the arthroplasty scar. He reports his ulnar-sided wrist pain is improving. He is also still complaining of pain at night. He is unable to sleep. Is been taking gabapentin at night. Reports the [place] is not helping his pain    His exam: Scars are maturing. Sensitivity over the arthroplasty site and the volar radial wrist surgical site and mid forearm scar. Minimal tenderness over the TFCC. He has full pronation supination of the forearm. He remains neurovascular intact. Assessment: 5 weeks postop    Plan    He is provided information on scar away silicone pads. Adjusted his gabapentin 300 mg twice a day. His pain medication to switch from [place] to Percocet. May advance in therapy. Wean out of his splint. Improve range of motion. May incorporate light strengthening at 8 weeks postop. Modalities as needed. Follow-up 4 to 6 weeks with x-rays of his thumb. Informed consent was obtained for continued opioid treatment. Patient agrees to continue the current treatment and agrees the benefits of opioid treatment ( decreased pain, improved function) continue to outweigh the potential risks ( confusion, change in thinking ability, depression, dry mouth, nausea, constipation, vomiting, impaired coordination/balance, sleepiness, damage liver or kidneys, opioid-induced hyperalgesia, physical dependence, addiction, withdrawal, respiratory depression and even death).

## 2021-06-16 ENCOUNTER — TREATMENT (OUTPATIENT)
Dept: OCCUPATIONAL THERAPY | Age: 71
End: 2021-06-16
Payer: COMMERCIAL

## 2021-06-16 DIAGNOSIS — M18.12 ARTHRITIS OF CARPOMETACARPAL (CMC) JOINT OF LEFT THUMB: Primary | ICD-10-CM

## 2021-06-16 DIAGNOSIS — M65.4 TENOSYNOVITIS, DE QUERVAIN: ICD-10-CM

## 2021-06-16 PROCEDURE — 97022 WHIRLPOOL THERAPY: CPT | Performed by: OCCUPATIONAL THERAPIST

## 2021-06-16 PROCEDURE — 97530 THERAPEUTIC ACTIVITIES: CPT | Performed by: OCCUPATIONAL THERAPIST

## 2021-06-16 PROCEDURE — 97110 THERAPEUTIC EXERCISES: CPT | Performed by: OCCUPATIONAL THERAPIST

## 2021-06-16 PROCEDURE — 97140 MANUAL THERAPY 1/> REGIONS: CPT | Performed by: OCCUPATIONAL THERAPIST

## 2021-06-16 NOTE — PROGRESS NOTES
OCCUPATIONAL THERAPY PROGRESS NOTE    Date:  2021                          Initial Evaluation Date: 2021    Patient Name:  Fortino Phelps. :  1950    Restrictions/Precautions:  See specifics listed below, Low fall risk  Diagnosis:    M65.4 (ICD-10-CM) - Tenosynovitis, de Quervain   M18.12 (ICD-10-CM) - Arthritis of carpometacarpal (CMC) joint of left thumb   Date of Surgery/Injury: 21 L thumb CMC arthroplasty revision, L FCR tenotomy/ debridement, L wrist arthropathy    ( 5 weeks post op)  Insurance/Certification information:  [facility]  Plan of care signed (Y/N): Y ( thru 21)  Visit# / total visits:  approved visits ( 1-15-21 to 21)     Referring Practitioner:  Dr Souleymane Merritt  Specific Practitioner Orders: [facility] advance in therapy. Wean out of his splint. Improve range of motion. May incorporate light strengthening at 8 weeks postop. Modalities as needed    Assessment of current deficits   []? Functional mobility             [x]? ADLs          [x]? Strength                  []? Cognition   []? Functional transfers           [x]? IADLs         []? Safety Awareness  []? Endurance   [x]? Fine Motor Coordination    []? Balance      []? Vision/perception   [x]? Sensation     []? Gross Motor Coordination [x]? ROM           [x]? Pain                        [x]? Edema          [x]? Scar Adhesion/Skin Integrity      OT PLAN OF CARE   OT POC based on physician orders, patient diagnosis and results of clinical assessment     Frequency/Duration: 3x/ week x 6 weeks  Specific OT Treatment to include:      [x]? Instruction in HEP                   Modalities:  [x]?  Therapeutic Exercise                 [x]? Ultrasound               []? Electrical Stimulation/Attended  [x]? PROM/Stretching                    [x]? Fluidotherapy          [x]?   Paraffin                   [x]? AAROM  [x]?  AROM                 []? Iontophoresis:   [x]? Zoie Yoo                                    []? Neuromuscular Re-Ed            []? ADL/IADL re-training    [x]?  Therapeutic Activity                  [x]? Pain Management with/without modalities PRN                 [x]?  Manual Therapy                      [x]? Splinting                                   [x]? Scar Management                   []?Joint Protection/Training  []? Ergonomics                             []? Joint Mobilization                      []? Adaptive Equipment Assessment/Training                             [x]?  Manual Edema Mobilization   []? Myofascial Release                 []? Energy Conservation/Work Simplification  [x]? FM Coordination           []? Safety retraining/education per  individual diagnosis/goals  [x]? Desensitization                                   GOALS (Long term same as Short term):  1) Patient will demonstrate good understanding of home program (exercises/activities/diagnosis/prognosis/goals) with good accuracy. 2) Patient will demonstrate increased active/passive range of motion of their left thumb / Valma Naga WFLs for ADL/IADL completion. 3) Patient will demonstrate /pinch strength of at least 40 / 5 pinch pounds of their left hand. 4) Patient to report decreased pain in their affected left distal  upper extremity from 9/10 to 3/10 or less with resistive functional use. 5) Increase in fine motor function as evidenced by time to complete 9-hole peg test by < 30  Seconds and improved ability to perform fasteners. 6) Patient to report a decrease in hypersensitivity from 100% to 20% or less in their left thumb/ wrist.   7) Patient to demonstrate decreased guarding of their affected extremity from 100% to 20% or less. 8) Patient will demonstrate a non-tender/non-adherent scar.    9) Patient will report ADL/ IADL  functions as Mod I/I using the affected left hand/ wrist.   10) Patient will decrease QuickDASH score to 30% or less for increased participation in daily functional activities. Pain Level:    8/10 in the L wrist/ thumb  Subjective: \" I feel pretty stiff today. It still hurts. I saw the doctor yesterday too. \"  Objective:  Updated POC to be completed by 6/26/2021. INTERVENTION: COMPLETED: SPECIFICS/COMMENTS:   Modality:     US  X 5 min To MP area dorsally 3 min and thumb thenar area 4 min. 3.3 MhZ, 20%, intensity . 08    Fluidotherapy- L hand/ wrist X- ^10 min Completed with gentle AROM as tolerated to increase pain free ROM and promote soft tissue elasticity        AROM:          Digital AROM X  - tendon glides/MP flex/ext,  Hook fist and then full fist- 10x     L thumb AROM X   X  x  - IP flex/ ext 15x  - AROM all planes 10x each  - alternating opposition ex with small pom poms        L forearm rotation x    AAROM:     Wrist flex/ext x Completed as tolerated/ wrist ext very stiff and painful             PROM/Stretching:     L thumb IP flexion X- 15 rep's  Before doing AROM IP flexion             Scar Mass/Edema Control:     Scar and retrograde x - manual  - silicone gel provided to help scar   Mesh compression sleeve  x Continue on thumb to decrease pain and edema as needed. Wound care  Saline soak with MHP to aide with steri strip removal   Strengthening:               Other:     HEP   Reviewed all and given handout - see attached. Splint on and digital exer. And thumb IP flex/ext-see above.     x AROM- thumb extension, thumb ABD, IP blocking ex, supination/ pronation, wrist flexion/ extension     Assessment/Comments: Pt is making Good progress toward stated plan of care. Pt seen following brief absence going OOT. Pt saw the MD. New orders received to wean from brace as tolerated and to advance as tolerated. Tx continued with attention to desensitization/ pain mgmt, ROM and scar mgmt. AROM is getting better but pain continues to limit ROM tolerance.      -Rehab Potential: Good  -Requires OT Follow Up: Yes  Time In:  9:00 am            Time Out: 1000  am Visit #: 7    Treatment Charges: Mins Units   Modalities- US/ Fluido 5/ 10 1   Ther Exercise 15 1   Manual Therapy 20 1   Thera Activities 10 1   ADL/Home Mgt      Neuro Re-education     Gait Training     Group Therapy     Non-Billable Service Time- [facility]     Other     Total Time/Units 60 4       -Response to Treatment: The importance of movement during this healing period discussed. Goals: Goals for pt can be see on initial eval occurring on 5/26/21    Plan:   [x]  Continues Plan of care: Continue AROM as tolerated, desesensitization , pain mgmt and edema control techniques. Treatment covered based on POC and graduated to patient's progress. Pt education continues at each visit to obtain maximum benefits from skilled OT intervention.   []  [facility] of care:   []  Discharge:      Anthony Gaines OT/JOELLE, [id number]

## 2021-06-18 ENCOUNTER — TREATMENT (OUTPATIENT)
Dept: OCCUPATIONAL THERAPY | Age: 71
End: 2021-06-18
Payer: COMMERCIAL

## 2021-06-18 DIAGNOSIS — M65.4 TENOSYNOVITIS, DE QUERVAIN: ICD-10-CM

## 2021-06-18 DIAGNOSIS — M18.12 ARTHRITIS OF CARPOMETACARPAL (CMC) JOINT OF LEFT THUMB: Primary | ICD-10-CM

## 2021-06-18 PROCEDURE — 97530 THERAPEUTIC ACTIVITIES: CPT | Performed by: OCCUPATIONAL THERAPIST

## 2021-06-18 PROCEDURE — 97110 THERAPEUTIC EXERCISES: CPT | Performed by: OCCUPATIONAL THERAPIST

## 2021-06-18 PROCEDURE — 97140 MANUAL THERAPY 1/> REGIONS: CPT | Performed by: OCCUPATIONAL THERAPIST

## 2021-06-18 PROCEDURE — 97022 WHIRLPOOL THERAPY: CPT | Performed by: OCCUPATIONAL THERAPIST

## 2021-06-18 NOTE — PROGRESS NOTES
[facility] OCCUPATIONAL THERAPY   [address]  [facility] [address]  Dept: [address] OT Fax: [phone]    OCCUPATIONAL THERAPY PROGRESS NOTE    Date:  2021                          Initial Evaluation Date: 2021    Patient Name:  Nabor Bella. :  1950    Restrictions/Precautions:  See specifics listed below, Low fall risk  Diagnosis:    M65.4 (ICD-10-CM) - Tenosynovitis, de Quervain   M18.12 (ICD-10-CM) - Arthritis of carpometacarpal (CMC) joint of left thumb   Date of Surgery/Injury: 21 L thumb CMC arthroplasty revision, L FCR tenotomy/ debridement, L wrist arthropathy    ( 5 weeks post op)  Insurance/Certification information:  [facility]  Plan of care signed (Y/N): Y ( thru 21)  Visit# / total visits:  approved visits ( 1-15-21 to 21)     Referring Practitioner:  Dr Areli Shen  Specific Practitioner Orders: [facility] advance in therapy. Wean out of his splint. Improve range of motion. May incorporate light strengthening at 8 weeks postop. Modalities as needed    Assessment of current deficits   []? Functional mobility             [x]? ADLs          [x]? Strength                  []? Cognition   []? Functional transfers           [x]? IADLs         []? Safety Awareness  []? Endurance   [x]? Fine Motor Coordination    []? Balance      []? Vision/perception   [x]? Sensation     []? Gross Motor Coordination [x]? ROM           [x]? Pain                        [x]? Edema          [x]? Scar Adhesion/Skin Integrity      OT PLAN OF CARE   OT POC based on physician orders, patient diagnosis and results of clinical assessment     Frequency/Duration: 3x/ week x 6 weeks  Specific OT Treatment to include:      [x]? Instruction in HEP                   Modalities:  [x]?  Therapeutic Exercise                 [x]? Ultrasound               []? Electrical Stimulation/Attended  [x]? PROM/Stretching                    [x]? Fluidotherapy          [x]?   Paraffin                   [x]? AAROM  [x]?  AROM                 []? Iontophoresis:   [x]? Zoie Yoo                                       []? Neuromuscular Re-Ed            []? ADL/IADL re-training    [x]?  Therapeutic Activity                  [x]? Pain Management with/without modalities PRN                 [x]?  Manual Therapy                      [x]? Splinting                                   [x]? Scar Management                   []?Joint Protection/Training  []? Ergonomics                             []? Joint Mobilization                      []? Adaptive Equipment Assessment/Training                             [x]?  Manual Edema Mobilization   []? Myofascial Release                 []? Energy Conservation/Work Simplification  [x]? FM Coordination           []? Safety retraining/education per  individual diagnosis/goals  [x]? Desensitization                                   GOALS (Long term same as Short term):  1) Patient will demonstrate good understanding of home program (exercises/activities/diagnosis/prognosis/goals) with good accuracy. 2) Patient will demonstrate increased active/passive range of motion of their left thumb / Maria De Jesus Brunson WFLs for ADL/IADL completion. 3) Patient will demonstrate /pinch strength of at least 40 / 5 pinch pounds of their left hand. 4) Patient to report decreased pain in their affected left distal  upper extremity from 9/10 to 3/10 or less with resistive functional use. 5) Increase in fine motor function as evidenced by time to complete 9-hole peg test by < 30  Seconds and improved ability to perform fasteners. 6) Patient to report a decrease in hypersensitivity from 100% to 20% or less in their left thumb/ wrist.   7) Patient to demonstrate decreased guarding of their affected extremity from 100% to 20% or less. 8) Patient will demonstrate a non-tender/non-adherent scar.    9) Patient will report ADL/ IADL  functions as Mod I/I using the affected left hand/ wrist.   10) Patient will decrease QuickDASH score to 30% or less for increased participation in daily functional activities. Pain Level:    8/10 in the L wrist/ thumb shooting, aching, tight/ pulling pains  Subjective: \" My thumb just hurts and is really sensitive\". Objective:  Updated POC to be completed by 6/26/2021. INTERVENTION: COMPLETED: SPECIFICS/COMMENTS:   Modality:     US  X 5 min To MP area dorsally 3 min and thumb thenar area 4 min. 3.3 MhZ, 20%, intensity . 08 to minimize scar adhesions and decrease post activity swelling   Fluidotherapy- L hand/ wrist X- 10 min Completed with gentle AROM thumb/ wrist as tolerated to increase pain free ROM and promote soft tissue elasticity        AROM:          Digital AROM X     x - tendon glides/MP flex/ext,  Hook fist and then full fist- 10x    -towel ex (NEW)   L thumb AROM X   X  x     x - IP flex/ ext 15x  - AROM all planes 10x each  - alternating opposition ex with marbles (updated)  - exercise balls on table (NEW)        L forearm rotation x    AAROM:     Wrist flex/ext/ deviations added x Completed as tolerated/ wrist ext stiff and painful with AROM   L thumb AAROM x All planes        PROM/Stretching:     L thumb IP flexion X- 15 rep's               Scar Mass/Edema Control:     Scar and retrograde x - manual massage  -manual desensitization  - silicone gel provided to help scar   Mesh compression sleeve  x Continue on thumb to decrease pain and edema as needed. Strengthening:               Other:     HEP   Reviewed all and given handout - see attached. Splint on and digital exer. And thumb IP flex/ext-see above.     x AROM- thumb extension, thumb ABD, IP blocking ex, supination/ pronation, wrist flexion/ extension/ encourage more self massage for scar mgmt and desensitization (NEW)     Assessment/Comments: Pt is making Good progress toward stated plan of care. Tx continued with a focus on thumb/ wrist ROM.  Pain and nerve hypersensitivity continues to inhibit tolerance for movement. However, tolerance for PROM is better than tolerance for AROM. Opposition has improved to the index/ middle and is close to touching the ring finger which is an improvement. The importance of doing home program and starting to decrease splint use discussed. Pt remains apprehensive due to the higher pain levels. -Rehab Potential: Good  -Requires OT Follow Up: Yes  Time In:  9:05 am            Time Out: 1005  am              Visit #: 8    Treatment Charges: Mins Units   Modalities- US/ Fluido 5/ 10 1   Ther Exercise 15 1   Manual Therapy 20 1   Thera Activities 10 1   ADL/Home Mgt      Neuro Re-education     Gait Training     Group Therapy     Non-Billable Service Time- MHP     Other     Total Time/Units 60 4       -Response to Treatment: The importance of movement during this healing period discussed again. Pt is concerned about his nerve pains. Support provided. Goals: Goals for pt can be see on initial eval occurring on 5/26/21    Plan:   [x]  Continues Plan of care: Continue AROM as tolerated, desesensitization , pain mgmt and edema control techniques. Encourage continued wean from thumb splint for light activities. Treatment covered based on POC and graduated to patient's progress. Pt education continues at each visit to obtain maximum benefits from skilled OT intervention.   []  [address] [facility] of care:   []  Discharge:      Marquita Sánchez OT/L, [id number]

## 2021-06-21 ENCOUNTER — TREATMENT (OUTPATIENT)
Dept: OCCUPATIONAL THERAPY | Age: 71
End: 2021-06-21
Payer: COMMERCIAL

## 2021-06-21 DIAGNOSIS — M18.12 ARTHRITIS OF CARPOMETACARPAL (CMC) JOINT OF LEFT THUMB: Primary | ICD-10-CM

## 2021-06-21 DIAGNOSIS — M65.4 TENOSYNOVITIS, DE QUERVAIN: ICD-10-CM

## 2021-06-21 DIAGNOSIS — S60.222A CONTUSION OF LEFT HAND, INITIAL ENCOUNTER: ICD-10-CM

## 2021-06-21 PROCEDURE — 97110 THERAPEUTIC EXERCISES: CPT | Performed by: OCCUPATIONAL THERAPIST

## 2021-06-21 PROCEDURE — 97035 APP MDLTY 1+ULTRASOUND EA 15: CPT | Performed by: OCCUPATIONAL THERAPIST

## 2021-06-21 PROCEDURE — 97530 THERAPEUTIC ACTIVITIES: CPT | Performed by: OCCUPATIONAL THERAPIST

## 2021-06-21 PROCEDURE — 97140 MANUAL THERAPY 1/> REGIONS: CPT | Performed by: OCCUPATIONAL THERAPIST

## 2021-06-21 NOTE — PROGRESS NOTES
[facility] OCCUPATIONAL THERAPY   [address]  [facility] [address]  Dept: [address] OT Fax: [phone]    OCCUPATIONAL THERAPY PROGRESS NOTE    Date:  2021                          Initial Evaluation Date: 2021    Patient Name:  Austin Mari. :  1950    Restrictions/Precautions:  See specifics listed below, Low fall risk  Diagnosis:    M65.4 (ICD-10-CM) - Tenosynovitis, de Quervain   M18.12 (ICD-10-CM) - Arthritis of carpometacarpal (CMC) joint of left thumb   Date of Surgery/Injury: 21 L thumb CMC arthroplasty revision, L FCR tenotomy/ debridement, L wrist arthropathy    ( 6 weeks post op)  Insurance/Certification information:  [facility]  Plan of care signed (Y/N): Y ( thru 21)  Visit# / total visits:  approved visits ( 1-15-21 to 21)     Referring Practitioner:  Dr Crys Warren  Specific Practitioner Orders: [facility] advance in therapy. Wean out of his splint. Improve range of motion. May incorporate light strengthening at 8 weeks postop. Modalities as needed    Assessment of current deficits   []? Functional mobility             [x]? ADLs          [x]? Strength                  []? Cognition   []? Functional transfers           [x]? IADLs         []? Safety Awareness  []? Endurance   [x]? Fine Motor Coordination    []? Balance      []? Vision/perception   [x]? Sensation     []? Gross Motor Coordination [x]? ROM           [x]? Pain                        [x]? Edema          [x]? Scar Adhesion/Skin Integrity      OT PLAN OF CARE   OT POC based on physician orders, patient diagnosis and results of clinical assessment     Frequency/Duration: 3x/ week x 6 weeks  Specific OT Treatment to include:      [x]? Instruction in HEP                   Modalities:  [x]?  Therapeutic Exercise                 [x]? Ultrasound               []? Electrical Stimulation/Attended  [x]? PROM/Stretching                    [x]? Fluidotherapy          [x]?   Paraffin                   [x]? AAROM  [x]?  AROM                 []? Iontophoresis:   [x]? Navi Garcialer                                       []? Neuromuscular Re-Ed            []? ADL/IADL re-training    [x]?  Therapeutic Activity                  [x]? Pain Management with/without modalities PRN                 [x]?  Manual Therapy                      [x]? Splinting                                   [x]? Scar Management                   []?Joint Protection/Training  []? Ergonomics                             []? Joint Mobilization                      []? Adaptive Equipment Assessment/Training                             [x]?  Manual Edema Mobilization   []? Myofascial Release                 []? Energy Conservation/Work Simplification  [x]? FM Coordination           []? Safety retraining/education per  individual diagnosis/goals  [x]? Desensitization                                   GOALS (Long term same as Short term):  1) Patient will demonstrate good understanding of home program (exercises/activities/diagnosis/prognosis/goals) with good accuracy. 2) Patient will demonstrate increased active/passive range of motion of their left thumb / Jennifer Presto WFLs for ADL/IADL completion. 3) Patient will demonstrate /pinch strength of at least 40 / 5 pinch pounds of their left hand. 4) Patient to report decreased pain in their affected left distal  upper extremity from 9/10 to 3/10 or less with resistive functional use. 5) Increase in fine motor function as evidenced by time to complete 9-hole peg test by < 30  Seconds and improved ability to perform fasteners. 6) Patient to report a decrease in hypersensitivity from 100% to 20% or less in their left thumb/ wrist.   7) Patient to demonstrate decreased guarding of their affected extremity from 100% to 20% or less. 8) Patient will demonstrate a non-tender/non-adherent scar.    9) Patient will report ADL/ IADL  functions as Mod I/I using the affected left hand/ wrist.   10) Patient will decrease QuickDASH score to 30% or less for increased participation in daily functional activities. Pain Level:    8/10 in the L wrist/ thumb shooting, aching, tight/ pulling pains  Subjective: \" My thumb pain is a little better but is still a 8-9/10 at night and in the morning. \"  Objective:  Updated POC to be completed by 6/26/2021. INTERVENTION: COMPLETED: SPECIFICS/COMMENTS:   Modality:     US  X 7 min To MP area dorsally 3 min and thumb thenar area 4 min. 3.3 MhZ, 20%, intensity . 08 to minimize scar adhesions and decrease post activity swelling   Fluidotherapy- L hand/ wrist X- 10 min Completed with gentle AROM thumb/ wrist as tolerated to increase pain free ROM and promote soft tissue elasticity        AROM:          Digital AROM X     X  x - tendon glides/MP flex/ext,  Hook fist and then full fist- 10x    -towel ex  -beans grasp and release - 5 min    L thumb AROM X   X  x     x - IP and MP flex/ ext 15x  - AROM all planes 10x each  - alternating opposition ex with marbles (updated)- did with pom poms this date and devang to RF.  - exercise balls on table (NEW)   L wrist AROM  X    x -tenodesis over incline - 10 rep's, RD and UD on table  -jux er ciser- 2 reps using his entire arm - no restrictions - pt showing good wrist an forearm mobility        L forearm rotation x    AAROM:     Wrist flex/ext/ deviations added x Completed as tolerated/ wrist ext stiff and painful with AROM   L thumb AAROM x All planes        PROM/Stretching:     L thumb IP flexion X- 15 rep's     L gentle wrist all planes x         Scar Mass/Edema Control:     Scar and retrograde X  x - manual massage  -manual desensitization- very sensitive on CMC scar  - silicone gel provided to help scar   Mesh compression sleeve  x Continue on thumb to decrease pain and edema as needed. Strengthening:               Other:          splint x Pt began weaning off of - during the day.   Is not using his L arm - maximum benefits from skilled OT intervention.   []  [facility] Plan of care:   []  Discharge:      Bud Corley, OT/L, CHT

## 2021-06-23 ENCOUNTER — TREATMENT (OUTPATIENT)
Dept: OCCUPATIONAL THERAPY | Age: 71
End: 2021-06-23
Payer: COMMERCIAL

## 2021-06-23 DIAGNOSIS — M18.12 ARTHRITIS OF CARPOMETACARPAL (CMC) JOINT OF LEFT THUMB: Primary | ICD-10-CM

## 2021-06-23 DIAGNOSIS — M65.4 TENOSYNOVITIS, DE QUERVAIN: ICD-10-CM

## 2021-06-23 PROCEDURE — 97140 MANUAL THERAPY 1/> REGIONS: CPT | Performed by: OCCUPATIONAL THERAPIST

## 2021-06-23 PROCEDURE — 97530 THERAPEUTIC ACTIVITIES: CPT | Performed by: OCCUPATIONAL THERAPIST

## 2021-06-23 PROCEDURE — 97022 WHIRLPOOL THERAPY: CPT | Performed by: OCCUPATIONAL THERAPIST

## 2021-06-23 PROCEDURE — 97110 THERAPEUTIC EXERCISES: CPT | Performed by: OCCUPATIONAL THERAPIST

## 2021-06-23 NOTE — PROGRESS NOTES
[facility] OCCUPATIONAL THERAPY   [facility] [address]  [facility] [address]  Dept: [address] OT Fax: [phone]    OCCUPATIONAL THERAPY PROGRESS NOTE    Date:  2021                          Initial Evaluation Date: 2021    Patient Name:  Charla Oleary. :  1950    Restrictions/Precautions:  See specifics listed below, Low fall risk  Diagnosis:    M65.4 (ICD-10-CM) - Tenosynovitis, de Quervain   M18.12 (ICD-10-CM) - Arthritis of carpometacarpal (CMC) joint of left thumb   Date of Surgery/Injury: 21 L thumb CMC arthroplasty revision, L FCR tenotomy/ debridement, L wrist arthropathy    ( 6 weeks post op)  Insurance/Certification information:  [facility]  Plan of care signed (Y/N): Y ( thru 21)  Visit# / total visits: 10 / 18 approved visits ( 1-15-21 to 21)     Referring Practitioner:  Dr Phillip Gunter  Specific Practitioner Orders: [facility] advance in therapy. Wean out of his splint. Improve range of motion. May incorporate light strengthening at 8 weeks postop. Modalities as needed    Assessment of current deficits   []? Functional mobility             [x]? ADLs          [x]? Strength                  []? Cognition   []? Functional transfers           [x]? IADLs         []? Safety Awareness  []? Endurance   [x]? Fine Motor Coordination    []? Balance      []? Vision/perception   [x]? Sensation     []? Gross Motor Coordination [x]? ROM           [x]? Pain                        [x]? Edema          [x]? Scar Adhesion/Skin Integrity      OT PLAN OF CARE   OT POC based on physician orders, patient diagnosis and results of clinical assessment     Frequency/Duration: 3x/ week x 6 weeks  Specific OT Treatment to include:      [x]? Instruction in HEP                   Modalities:  [x]?  Therapeutic Exercise                 [x]? Ultrasound               []? Electrical Stimulation/Attended  [x]? PROM/Stretching                    [x]? Fluidotherapy the affected left hand/ wrist.   10) Patient will decrease QuickDASH score to 30% or less for increased participation in daily functional activities. Pain Level:    7/10 in the L wrist/ thumb aching, tight/ pulling pains  Subjective: \" The best my pain gets is a 5/10. It is really the worst at night tho.\"  Objective:  Updated POC to be completed by 6/26/2021. INTERVENTION: COMPLETED: SPECIFICS/COMMENTS:   Modality:     US   To MP area dorsally 3 min and thumb thenar area 4 min. 3.3 MhZ, 20%, intensity . 08 to minimize scar adhesions and decrease post activity swelling   Fluidotherapy- L hand/ wrist X- 10 min Completed with gentle AROM thumb/ wrist as tolerated to increase pain free ROM and promote soft tissue elasticity        AROM:          Digital AROM X        - tendon glides/MP flex/ext,  Hook fist and then full fist- 10x    -towel ex  -beans grasp and release - 5 min    L thumb AROM X   X  x      - IP and MP flex/ ext 15x  - AROM all planes 10x each  - alternating opposition ex with marbles   - exercise balls on table    L wrist AROM        X    x   -tenodesis over incline - 10 rep's, RD and UD on table  -jux er ciser- 2 reps using his entire arm -   - palm flat w/ wrist active ext hold 3-4 sec 10x  - wrist AROM all planes        L forearm rotation x    AAROM:     Wrist flex/ext/ deviations  x Completed as tolerated   L thumb AAROM x All planes        PROM/Stretching:     L thumb IP flexion X- 15 rep's     L gentle wrist all planes x         Scar Mass/Edema Control:     Scar and retrograde X  x - manual massage  -manual desensitization   - silicone gel provided to help scar   Mesh compression sleeve  x Continue on thumb to decrease pain and edema as needed. Strengthening:               Other:          splint x Pt to continue weaning off of splint - during the day. HEP   Reviewed all and given handout - see attached. Splint on and digital exer.  And thumb IP flex/ext-see above.     x AROM- thumb extension, thumb ABD, IP blocking ex, supination/ pronation, wrist flexion/ extension/ encourage more self massage for scar mgmt and desensitization      Assessment/Comments: Pt is making Good progress toward stated plan of care. Wrist and thumb ROM activities continued. Pt states he has been taking his brace off to use it during some parts of the day. Status updated as follows: Thumb AROM:  IP 0-35 ( from 0-30)  MP 0-15  Opposition: intact to all digits except the small finger ( from intact to the index only)  Radial ext 0-45 (from 0-40)  Palmar ABD 0-50 (from 20-35)    Wrist AROM  Flexion 0-65 (from 0-25)  Extension 0-35 (from 0)  RD 0-10  UD 0-25    Supination 0-60 (from 0-30)  Pronation 0-90 (from 0-60)    Hand DPC: WFL    Nine Hole Peg Test: L 46 sec    -Rehab Potential: Good  -Requires OT Follow Up: Yes  Time In:  9:00 am            Time Out: 1000  am              Visit #: 9    Treatment Charges: Mins Units   Modalities- US/ Fluido 0/ 10 1   Ther Exercise 15 1   Manual Therapy 15 1   Thera Activities 20 1   ADL/Home Mgt      Neuro Re-education     Gait Training     Group Therapy     Non-Billable Service Time- P     Other     Total Time/Units 60 4       -Response to Treatment: Pt is encouraged by the retesting results. Support provided. Goals: Goals for pt can be see on initial eval occurring on 5/26/21, update 6-23-21. Plan:   [x]  Continues Plan of care: Continue AROM as tolerated, desesensitization , pain mgmt and functional hand activities. Wean from thumb splint for light activities. Treatment covered based on POC and graduated to patient's progress. Pt education continues at each visit to obtain maximum benefits from skilled OT intervention.   []  [facility] of care:   []  Discharge:      Kristopher Mace OT/L, [id number]

## 2021-06-25 ENCOUNTER — TREATMENT (OUTPATIENT)
Dept: OCCUPATIONAL THERAPY | Age: 71
End: 2021-06-25
Payer: COMMERCIAL

## 2021-06-25 DIAGNOSIS — M65.4 TENOSYNOVITIS, DE QUERVAIN: ICD-10-CM

## 2021-06-25 DIAGNOSIS — S60.222A CONTUSION OF LEFT HAND, INITIAL ENCOUNTER: ICD-10-CM

## 2021-06-25 DIAGNOSIS — M18.12 ARTHRITIS OF CARPOMETACARPAL (CMC) JOINT OF LEFT THUMB: Primary | ICD-10-CM

## 2021-06-25 PROCEDURE — 97110 THERAPEUTIC EXERCISES: CPT | Performed by: OCCUPATIONAL THERAPIST

## 2021-06-25 PROCEDURE — 97022 WHIRLPOOL THERAPY: CPT | Performed by: OCCUPATIONAL THERAPIST

## 2021-06-25 PROCEDURE — 97035 APP MDLTY 1+ULTRASOUND EA 15: CPT | Performed by: OCCUPATIONAL THERAPIST

## 2021-06-25 PROCEDURE — 97140 MANUAL THERAPY 1/> REGIONS: CPT | Performed by: OCCUPATIONAL THERAPIST

## 2021-06-25 NOTE — PROGRESS NOTES
[facility] OCCUPATIONAL THERAPY   [address]  [facility] [address]  Dept: [phone] Fax: [phone]    OCCUPATIONAL THERAPY PROGRESS NOTE    Date:  2021                          Initial Evaluation Date: 2021    Patient Name:  Amanda Boyd. :  1950    Restrictions/Precautions:  See specifics listed below, Low fall risk  Diagnosis:    M65.4 (ICD-10-CM) - Tenosynovitis, de Quervain   M18.12 (ICD-10-CM) - Arthritis of carpometacarpal (CMC) joint of left thumb   Date of Surgery/Injury: 21 L thumb CMC arthroplasty revision, L FCR tenotomy/ debridement, L wrist arthropathy    ( 6 weeks post op)  Insurance/Certification information:  [facility]  Plan of care signed (Y/N): Y ( thru 21)  Visit# / total visits:  approved visits ( 1-15-21 to 21)     Referring Practitioner:  Dr Trinity Ratliff  Specific Practitioner Orders: [facility] advance in therapy. Wean out of his splint. Improve range of motion. May incorporate light strengthening at 8 weeks postop. Modalities as needed    Assessment of current deficits   []? Functional mobility             [x]? ADLs          [x]? Strength                  []? Cognition   []? Functional transfers           [x]? IADLs         []? Safety Awareness  []? Endurance   [x]? Fine Motor Coordination    []? Balance      []? Vision/perception   [x]? Sensation     []? Gross Motor Coordination [x]? ROM           [x]? Pain                        [x]? Edema          [x]? Scar Adhesion/Skin Integrity      OT PLAN OF CARE   OT POC based on physician orders, patient diagnosis and results of clinical assessment     Frequency/Duration: 3x/ week x 6 weeks  Specific OT Treatment to include:      [x]? Instruction in HEP                   Modalities:  [x]?  Therapeutic Exercise                 [x]? Ultrasound               []? Electrical Stimulation/Attended  [x]? PROM/Stretching                    [x]? Fluidotherapy          [x]?   Paraffin                   [x]? AAROM  [x]?  AROM                 []? Iontophoresis:   [x]? Freedom Marcus                                       []? Neuromuscular Re-Ed            []? ADL/IADL re-training    [x]?  Therapeutic Activity                  [x]? Pain Management with/without modalities PRN                 [x]?  Manual Therapy                      [x]? Splinting                                   [x]? Scar Management                   []?Joint Protection/Training  []? Ergonomics                             []? Joint Mobilization                      []? Adaptive Equipment Assessment/Training                             [x]?  Manual Edema Mobilization   []? Myofascial Release                 []? Energy Conservation/Work Simplification  [x]? FM Coordination           []? Safety retraining/education per  individual diagnosis/goals  [x]? Desensitization                                   GOALS (Long term same as Short term):  1) Patient will demonstrate good understanding of home program (exercises/activities/diagnosis/prognosis/goals) with good accuracy. 2) Patient will demonstrate increased active/passive range of motion of their left thumb / Sadiq Millet WFLs for ADL/IADL completion. 3) Patient will demonstrate /pinch strength of at least 40 / 5 pinch pounds of their left hand. 4) Patient to report decreased pain in their affected left distal  upper extremity from 9/10 to 3/10 or less with resistive functional use. 5) Increase in fine motor function as evidenced by time to complete 9-hole peg test by < 30  Seconds and improved ability to perform fasteners. 6) Patient to report a decrease in hypersensitivity from 100% to 20% or less in their left thumb/ wrist.   7) Patient to demonstrate decreased guarding of their affected extremity from 100% to 20% or less. 8) Patient will demonstrate a non-tender/non-adherent scar.    9) Patient will report ADL/ IADL  functions as Mod I/I using the affected left hand/ wrist.   10) Patient will decrease QuickDASH score to 30% or less for increased participation in daily functional activities. Pain Level:    7/10 in the L wrist/ thumb aching, tight/ pulling pains  Subjective: Pt states \"I am starting to wean out of my brace a little\"  Objective:  Updated POC to be completed by 6/26/2021. INTERVENTION: COMPLETED: SPECIFICS/COMMENTS:   Modality:     US  x To MP area dorsally 4 min and thumb thenar area 4 min. 3.3 MhZ, 20%, intensity . 08 to minimize scar adhesions and decrease post activity swelling   Fluidotherapy- L hand/ wrist X- 10 min Completed with gentle AROM thumb/ wrist as tolerated to increase pain free ROM and promote soft tissue elasticity        AROM:          Digital AROM X        - tendon glides/MP flex/ext,  Hook fist and then full fist- 10x    -towel ex  -beans grasp and release - 5 min    L thumb AROM X   X  x      - IP and MP flex/ ext 15x  - AROM all planes 10x each  - alternating opposition ex with marbles              -pennies with IF and MF  - exercise balls on table   -radial abduction with pom pom picking up-focus on IP flexion   L wrist AROM        X    x   -tenodesis over incline - 10 rep's, RD and UD on table  -jux er ciser- 2 reps using his entire arm -   - palm flat w/ wrist active ext hold 3-4 sec 10x  - wrist AROM all planes        L forearm rotation x    AAROM:     Wrist flex/ext/ deviations  x Completed as tolerated   L thumb AAROM x All planes        PROM/Stretching:     L thumb IP flexion X- 15 rep's     L gentle wrist all planes x         Scar Mass/Edema Control:     Scar and retrograde X  x - manual massage  -manual desensitization   - silicone gel provided to help scar   Mesh compression sleeve  x Continue on thumb to decrease pain and edema as needed. Strengthening:               Other:          splint x Pt to continue weaning off of splint - during the day.      HEP   Reviewed all and given handout - see attached. Splint on and digital exer. And thumb IP flex/ext-see above.     x AROM- thumb extension, thumb ABD, IP blocking ex, supination/ pronation, wrist flexion/ extension/ encourage more self massage for scar mgmt and desensitization      Assessment/Comments: Pt is making Good progress toward stated plan of care. Pt tolerated session well with increased ROM noted this date with IP flexion with novel activities. Pt encouraged to increase weaning from splint throughout the day with continued wear at night. Will increase as tolerated. -Rehab Potential: Good  -Requires OT Follow Up: Yes  Time In:  11:00 am            Time Out: 1200  am              Visit #: 11    Treatment Charges: Mins Units   Modalities- US/ Fluido 8/ 10 1/1   Ther Exercise 20 1   Manual Therapy 17 1   Thera Activities     ADL/Home Mgt      Neuro Re-education     Gait Training     Group Therapy     Non-Billable Service Time- [facility]     Other     Total Time/Units 55 4       -Response to Treatment: Pt is encouraged by the retesting results. Support provided. Goals: Goals for pt can be see on initial eval occurring on 5/26/21, update 6-23-21. Plan:   [x]  Continues Plan of care: Continue AROM as tolerated, desesensitization , pain mgmt and functional hand activities. Wean from thumb splint for light activities. Treatment covered based on POC and graduated to patient's progress. Pt education continues at each visit to obtain maximum benefits from skilled OT intervention. []  Alter Plan of care:   []  Discharge:       [facility], OTR/L, [place], #[id number]

## 2021-06-30 ENCOUNTER — TREATMENT (OUTPATIENT)
Dept: OCCUPATIONAL THERAPY | Age: 71
End: 2021-06-30
Payer: COMMERCIAL

## 2021-06-30 DIAGNOSIS — M18.12 ARTHRITIS OF CARPOMETACARPAL (CMC) JOINT OF LEFT THUMB: Primary | ICD-10-CM

## 2021-06-30 DIAGNOSIS — M65.4 TENOSYNOVITIS, DE QUERVAIN: ICD-10-CM

## 2021-06-30 PROCEDURE — 97110 THERAPEUTIC EXERCISES: CPT | Performed by: OCCUPATIONAL THERAPIST

## 2021-06-30 PROCEDURE — 97530 THERAPEUTIC ACTIVITIES: CPT | Performed by: OCCUPATIONAL THERAPIST

## 2021-06-30 PROCEDURE — 97140 MANUAL THERAPY 1/> REGIONS: CPT | Performed by: OCCUPATIONAL THERAPIST

## 2021-06-30 PROCEDURE — 97022 WHIRLPOOL THERAPY: CPT | Performed by: OCCUPATIONAL THERAPIST

## 2021-06-30 NOTE — PROGRESS NOTES
[facility] OCCUPATIONAL THERAPY   [address]  [facility] [address]  Dept: [phone] OT Fax: [phone]    OCCUPATIONAL THERAPY PROGRESS NOTE    Date:  2021                          Initial Evaluation Date: 2021    Patient Name:  Georgia Ordoñez. :  1950    Restrictions/Precautions:  See specifics listed below, Low fall risk  Diagnosis:    M65.4 (ICD-10-CM) - Tenosynovitis, de Quervain   M18.12 (ICD-10-CM) - Arthritis of carpometacarpal (CMC) joint of left thumb   Date of Surgery/Injury: 21 L thumb CMC arthroplasty revision, L FCR tenotomy/ debridement, L wrist arthropathy    ( 7 weeks post op)  Insurance/Certification information:  [facility]  Plan of care signed (Y/N): Y ( thru 21)  Visit# / total visits:  approved visits ( 1-15-21 to 21)     Referring Practitioner:  Dr Cyndi Day  Specific Practitioner Orders: [facility] advance in therapy. Wean out of his splint. Improve range of motion. May incorporate light strengthening at 8 weeks postop. Modalities as needed    Assessment of current deficits   []? Functional mobility             [x]? ADLs          [x]? Strength                  []? Cognition   []? Functional transfers           [x]? IADLs         []? Safety Awareness  []? Endurance   [x]? Fine Motor Coordination    []? Balance      []? Vision/perception   [x]? Sensation     []? Gross Motor Coordination [x]? ROM           [x]? Pain                        [x]? Edema          [x]? Scar Adhesion/Skin Integrity      OT PLAN OF CARE   OT POC based on physician orders, patient diagnosis and results of clinical assessment     Frequency/Duration: 3x/ week x 6 weeks  Specific OT Treatment to include:      [x]? Instruction in HEP                   Modalities:  [x]?  Therapeutic Exercise                 [x]? Ultrasound               []? Electrical Stimulation/Attended  [x]? PROM/Stretching                    [x]? Fluidotherapy          [x]?   Paraffin                   [x]? AAROM  [x]?  AROM                 []? Iontophoresis:   [x]? Efrain Bah                                       []? Neuromuscular Re-Ed            []? ADL/IADL re-training    [x]?  Therapeutic Activity                  [x]? Pain Management with/without modalities PRN                 [x]?  Manual Therapy                      [x]? Splinting                                   [x]? Scar Management                   []?Joint Protection/Training  []? Ergonomics                             []? Joint Mobilization                      []? Adaptive Equipment Assessment/Training                             [x]?  Manual Edema Mobilization   []? Myofascial Release                 []? Energy Conservation/Work Simplification  [x]? FM Coordination           []? Safety retraining/education per  individual diagnosis/goals  [x]? Desensitization                                   GOALS (Long term same as Short term):  1) Patient will demonstrate good understanding of home program (exercises/activities/diagnosis/prognosis/goals) with good accuracy. 2) Patient will demonstrate increased active/passive range of motion of their left thumb / Adah Bene WFLs for ADL/IADL completion. 3) Patient will demonstrate /pinch strength of at least 40 / 5 pinch pounds of their left hand. 4) Patient to report decreased pain in their affected left distal  upper extremity from 9/10 to 3/10 or less with resistive functional use. 5) Increase in fine motor function as evidenced by time to complete 9-hole peg test by < 30  Seconds and improved ability to perform fasteners. 6) Patient to report a decrease in hypersensitivity from 100% to 20% or less in their left thumb/ wrist.   7) Patient to demonstrate decreased guarding of their affected extremity from 100% to 20% or less. 8) Patient will demonstrate a non-tender/non-adherent scar.    9) Patient will report ADL/ IADL  functions as Mod I/I using the affected left hand/ wrist.   10) Patient will decrease QuickDASH score to 30% or less for increased participation in daily functional activities. Pain Level:    8/10 in the L wrist/ thumb aching, tight/ pulling pains  Subjective: \" My thumb really hurts. Why is it hurting so long ? \"  Objective:  Updated POC to be completed by 7/26/2021. INTERVENTION: COMPLETED: SPECIFICS/COMMENTS:   Modality:     US   To MP area dorsally 4 min and thumb thenar area 4 min. 3.3 MhZ, 20%, intensity . 08 to minimize scar adhesions and decrease post activity swelling   Fluidotherapy- L hand/ wrist X- 10 min Completed with gentle AROM thumb/ wrist as tolerated to increase pain free ROM and promote soft tissue elasticity        AROM:          Digital AROM X     x   - tendon glides/MP flex/ext,  Hook fist and then full fist- 10x    -towel ex w/ digits and thumb  -beans grasp and release - 5 min    L thumb AROM X   X  x      - IP and MP flex/ ext 15x  - AROM all planes 10x each  - alternating opposition ex with pennies to each digit  - exercise balls on table   -radial abduction with pom pom picking up-focus on IP flexion   L wrist AROM      x  X    x   -tenodesis over incline - 10 rep's, RD and UD on table  -jux er ciser- ^ 4 reps using his entire arm -   - palm flat w/ wrist active ext hold 3-4 sec 10x  - wrist AROM all planes        L forearm rotation x Supination/ pronation   AAROM:     Wrist flex/ext/ deviations  x Completed as tolerated   L thumb AAROM x All planes        PROM/Stretching:     L thumb IP flexion X- 15 rep's     L gentle wrist all planes x         Scar Mass/Edema Control:     Scar and retrograde X  x - manual massage  -manual desensitization   - silicone gel provided to help scar   Mesh compression sleeve  x Continue on thumb to decrease pain and edema as needed. Strengthening:               Other:          splint x Pt to continue weaning off of splint - during the day.      HEP   Reviewed all and given handout - see attached. Splint on and digital exer. And thumb IP flex/ext-see above.     x AROM- thumb extension, thumb ABD, IP blocking ex, supination/ pronation, wrist flexion/ extension/ encourage more self massage for scar mgmt and desensitization      Assessment/Comments: Pt is making Good progress toward stated plan of care. The importance of moving more and using the thumb for light tasks discussed. Pt remains very guarded and with increased nerve / generalized pain in the thumb and wrist. Overall progress is slow. Pt appeared to push himself more in today's visit. ROM improved as session progressed. Will consider a large comfort cool neoprene support as a transition splint.    -Rehab Potential: Good  -Requires OT Follow Up: Yes  Time In:  11:00 am            Time Out: 1200  am              Visit #: 12      Treatment Charges: Mins Time in - Time out  Units   Modalities: Fluido 10 1100- 1110 1   Ther Exercise 15 2062-4698 1   Manual Therapy 15 1110- 1125 1   Thera Activities 20 1173-8180 1   ADL/Home Mgt       Neuro Re-education      Gait Training      Group Therapy      Non-Billable Service Time      Other      Total Time/Units 60  4     -Response to Treatment: Pt is frustrated by the persistent pain. MD follow up is in 1-2 weeks. Pt may benefit from a steroid to help the healing process. Support provided. Goals: Goals for pt can be see on initial eval occurring on 5/26/21, update 6-23-21. Plan:   [x]  Continues Plan of care: Continue AROM as tolerated, desesensitization , pain mgmt and functional hand activities. Wean from thumb splint for light activities. Advance into light hand strengthening at 8 weeks post op. Treatment covered based on POC and graduated to patient's progress. Pt education continues at each visit to obtain maximum benefits from skilled OT intervention.   []  [address] of care:   []  Discharge:      Chinedu West, OT/L  [id number]

## 2021-07-02 ENCOUNTER — TREATMENT (OUTPATIENT)
Dept: OCCUPATIONAL THERAPY | Age: 71
End: 2021-07-02
Payer: COMMERCIAL

## 2021-07-02 DIAGNOSIS — M18.12 ARTHRITIS OF CARPOMETACARPAL (CMC) JOINT OF LEFT THUMB: Primary | ICD-10-CM

## 2021-07-02 DIAGNOSIS — M65.4 TENOSYNOVITIS, DE QUERVAIN: ICD-10-CM

## 2021-07-02 PROCEDURE — 97530 THERAPEUTIC ACTIVITIES: CPT | Performed by: OCCUPATIONAL THERAPIST

## 2021-07-02 PROCEDURE — 97110 THERAPEUTIC EXERCISES: CPT | Performed by: OCCUPATIONAL THERAPIST

## 2021-07-02 PROCEDURE — 97760 ORTHOTIC MGMT&TRAING 1ST ENC: CPT | Performed by: OCCUPATIONAL THERAPIST

## 2021-07-02 PROCEDURE — 97022 WHIRLPOOL THERAPY: CPT | Performed by: OCCUPATIONAL THERAPIST

## 2021-07-02 NOTE — PROGRESS NOTES
[facility] OCCUPATIONAL THERAPY   [address]  [facility] [address]  Dept: [phone] OT Fax: [phone]    OCCUPATIONAL THERAPY PROGRESS NOTE    Date:  2021                          Initial Evaluation Date: 2021    Patient Name:  Sima Mcclain. :  1950    Restrictions/Precautions:  See specifics listed below, Low fall risk  Diagnosis:    M65.4 (ICD-10-CM) - Tenosynovitis, de Quervain   M18.12 (ICD-10-CM) - Arthritis of carpometacarpal (CMC) joint of left thumb   Date of Surgery/Injury: 21 L thumb CMC arthroplasty revision, L FCR tenotomy/ debridement, L wrist arthropathy    ( 7 weeks post op)  Insurance/Certification information:  [facility]  Plan of care signed (Y/N): Y ( thru 21)  Visit# / total visits:  approved visits ( 1-15-21 to 21)     Referring Practitioner:  Dr Lindsey Peguero  Specific Practitioner Orders: [facility] advance in therapy. Wean out of his splint. Improve range of motion. May incorporate light strengthening at 8 weeks postop. Modalities as needed    Assessment of current deficits   []? Functional mobility             [x]? ADLs          [x]? Strength                  []? Cognition   []? Functional transfers           [x]? IADLs         []? Safety Awareness  []? Endurance   [x]? Fine Motor Coordination    []? Balance      []? Vision/perception   [x]? Sensation     []? Gross Motor Coordination [x]? ROM           [x]? Pain                        [x]? Edema          [x]? Scar Adhesion/Skin Integrity      OT PLAN OF CARE   OT POC based on physician orders, patient diagnosis and results of clinical assessment     Frequency/Duration: 3x/ week x 6 weeks  Specific OT Treatment to include:      [x]? Instruction in HEP                   Modalities:  [x]?  Therapeutic Exercise                 [x]? Ultrasound               []? Electrical Stimulation/Attended  [x]? PROM/Stretching                    [x]? Fluidotherapy          [x]?   Paraffin                   [x]? AAROM  [x]?  AROM                 []? Iontophoresis:   [x]? Gina Tello                                       []? Neuromuscular Re-Ed            []? ADL/IADL re-training    [x]?  Therapeutic Activity                  [x]? Pain Management with/without modalities PRN                 [x]?  Manual Therapy                      [x]? Splinting                                   [x]? Scar Management                   []?Joint Protection/Training  []? Ergonomics                             []? Joint Mobilization                      []? Adaptive Equipment Assessment/Training                             [x]?  Manual Edema Mobilization   []? Myofascial Release                 []? Energy Conservation/Work Simplification  [x]? FM Coordination           []? Safety retraining/education per  individual diagnosis/goals  [x]? Desensitization                                   GOALS (Long term same as Short term):  1) Patient will demonstrate good understanding of home program (exercises/activities/diagnosis/prognosis/goals) with good accuracy. 2) Patient will demonstrate increased active/passive range of motion of their left thumb / Rubbie Owings WFLs for ADL/IADL completion. 3) Patient will demonstrate /pinch strength of at least 40 / 5 pinch pounds of their left hand. 4) Patient to report decreased pain in their affected left distal  upper extremity from 9/10 to 3/10 or less with resistive functional use. 5) Increase in fine motor function as evidenced by time to complete 9-hole peg test by < 30  Seconds and improved ability to perform fasteners. 6) Patient to report a decrease in hypersensitivity from 100% to 20% or less in their left thumb/ wrist.   7) Patient to demonstrate decreased guarding of their affected extremity from 100% to 20% or less. 8) Patient will demonstrate a non-tender/non-adherent scar.    9) Patient will report ADL/ IADL  functions as Mod I/I using the affected left hand/ wrist.   10) Patient will decrease QuickDASH score to 30% or less for increased participation in daily functional activities. Pain Level:    8/10 in the L wrist/ thumb aching, nerve shooting,  tight/ pulling pains  Subjective: \" My thumb is so sensitive ! \"  Objective:  Updated POC to be completed by 7/26/2021. INTERVENTION: COMPLETED: SPECIFICS/COMMENTS:   Modality:     US   To MP area dorsally 4 min and thumb thenar area 4 min. 3.3 MhZ, 20%, intensity . 08 to minimize scar adhesions and decrease post activity swelling   Fluidotherapy- L hand/ wrist X- 10 min Completed with gentle AROM thumb/ wrist as tolerated to increase pain free ROM and promote soft tissue elasticity        AROM:          Digital AROM X     x   - tendon glides/MP flex/ext,  Hook fist and then full fist- 10x    -towel ex w/ digits and thumb  -beans grasp and release - 5 min    L thumb AROM X   X  X    x      - IP and MP flex/ ext 15x  - AROM all planes 10x each  - alternating opposition ex with pennies to each digit  - exercise balls on table   -radial abduction with pom pom picking up-focus on IP flexion   L wrist AROM     --jux er ciser- ^ 4 reps using his entire arm   - wrist AROM all planes        L forearm rotation x Supination/ pronation   AAROM:     Wrist flex/ext/ deviations  x Completed as tolerated   L thumb AAROM x All planes        PROM/Stretching:     L thumb  X- 15 rep's  IP/ MP flexion/ gentle composite flexion as tolerated    L gentle wrist all planes x         Scar Mass/Edema Control:     Scar and retrograde X  x - manual massage  -manual desensitization   - silicone gel provided to help scar   Mesh compression sleeve  x Continue on thumb to decrease pain and edema as needed. Strengthening:               Other:     Desensitization (NEW) x  massage with graded textures from smooth to slightly rough/ pt to continue at home for 10 min self massage - 2x/ day   Splint (NEW) x Pt fit today with a large Comfort cool brace . Care of the splint reviewed. Pt is to wear splint for moderate to heavy activity only. Splint is to be off during rest and light tasks. HEP   Reviewed all and given handout - see attached. Splint on and digital exer. And thumb IP flex/ext-see above.     x AROM- thumb extension, thumb ABD, IP blocking ex, supination/ pronation, wrist flexion/ extension/ encourage more self massage for scar mgmt and desensitization      Assessment/Comments: Pt is making Good progress toward stated plan of care. Hypersensitivity in the thumb continues to be significant. The importance of moving more and using the thumb for light tasks discussed. Neoprene splint provided today  -Rehab Potential: Good  -Requires OT Follow Up: Yes  Time In:  11:00 am            Time Out: 1200  am              Visit #: 13      Treatment Charges: Mins Time in - Time out  Units   Modalities: Fluido 10 1100- 1110 1   Ther Exercise 15 1135- 1150 1   Manual Therapy 5 1110- 1115 0   Thera Activities 20 6017-6725 1   ADL/Home Mgt       Neuro Re-education      Gait Training      Group Therapy      Non-Billable Service Time      Other: ortho fit 10 1150- 1200 1   Total Time/Units 60  4     -Response to Treatment: Pt is concerned about his thumb more than the wrist. Support provided. Goals: Goals for pt can be see on initial eval occurring on 5/26/21, update 6-23-21. Plan:   [x]  Continues Plan of care: Continue AROM as tolerated, desesensitization , pain mgmt and functional hand activities. Wean from thumb splint for light activities. Advance into light hand strengthening at 8 weeks post op. Treatment covered based on POC and graduated to patient's progress. Pt education continues at each visit to obtain maximum benefits from skilled OT intervention.   []  [facility] of care:   []  Discharge:      Mynor Mayer, OT/L  [id number]

## 2021-07-06 DIAGNOSIS — G89.18 POST-OP PAIN: Primary | ICD-10-CM

## 2021-07-06 RX ORDER — HYDROCODONE BITARTRATE AND ACETAMINOPHEN 5; 325 MG/1; MG/1
1 TABLET ORAL EVERY 6 HOURS PRN
Qty: 28 TABLET | Refills: 0 | Status: SHIPPED | OUTPATIENT
Start: 2021-07-06 | End: 2021-07-13

## 2021-07-06 NOTE — TELEPHONE ENCOUNTER
Patient is requesting a medication refill, OARRS complete. Patient is 8 weeks out from left thumb revision arthroplasty with use of Arthrex mini tight rope as well as wrist arthroscopy with debridement of central TFCC tear. Patient was last seen on 6/15 and patients next appointment is 7/13. Patient was last given percocet 5 mg every 6 hours on 6/15.

## 2021-07-08 ENCOUNTER — TREATMENT (OUTPATIENT)
Dept: OCCUPATIONAL THERAPY | Age: 71
End: 2021-07-08
Payer: COMMERCIAL

## 2021-07-08 DIAGNOSIS — M18.12 ARTHRITIS OF CARPOMETACARPAL (CMC) JOINT OF LEFT THUMB: Primary | ICD-10-CM

## 2021-07-08 DIAGNOSIS — M65.4 TENOSYNOVITIS, DE QUERVAIN: ICD-10-CM

## 2021-07-08 PROCEDURE — 97530 THERAPEUTIC ACTIVITIES: CPT | Performed by: OCCUPATIONAL THERAPIST

## 2021-07-08 PROCEDURE — 97110 THERAPEUTIC EXERCISES: CPT | Performed by: OCCUPATIONAL THERAPIST

## 2021-07-08 PROCEDURE — 97140 MANUAL THERAPY 1/> REGIONS: CPT | Performed by: OCCUPATIONAL THERAPIST

## 2021-07-08 PROCEDURE — 97022 WHIRLPOOL THERAPY: CPT | Performed by: OCCUPATIONAL THERAPIST

## 2021-07-08 NOTE — PROGRESS NOTES
[facility] OCCUPATIONAL THERAPY   [address]  [facility] [address]  Dept: [address] OT Fax: [phone]    OCCUPATIONAL THERAPY PROGRESS NOTE    Date:  2021                          Initial Evaluation Date: 2021    Patient Name:  Avila Schaeffer. :  1950    Restrictions/Precautions:  See specifics listed below, Low fall risk  Diagnosis:    M65.4 (ICD-10-CM) - Tenosynovitis, de Quervain   M18.12 (ICD-10-CM) - Arthritis of carpometacarpal (CMC) joint of left thumb   Date of Surgery/Injury: 21 L thumb CMC arthroplasty revision, L FCR tenotomy/ debridement, L wrist arthropathy      Insurance/Certification information:  [facility] [id number]  Plan of care signed (Y/N): Y ( thru 21)  Visit# / total visits:  approved visits ( 1-15-21 to 21)     Referring Practitioner:  Dr Sathya Godfrey  Specific Practitioner Orders: [facility] advance in therapy. Wean out of his splint. Improve range of motion. May incorporate light strengthening at 8 weeks postop. Modalities as needed    Assessment of current deficits   []? Functional mobility             [x]? ADLs          [x]? Strength                  []? Cognition   []? Functional transfers           [x]? IADLs         []? Safety Awareness  []? Endurance   [x]? Fine Motor Coordination    []? Balance      []? Vision/perception   [x]? Sensation     []? Gross Motor Coordination [x]? ROM           [x]? Pain                        [x]? Edema          [x]? Scar Adhesion/Skin Integrity      OT PLAN OF CARE   OT POC based on physician orders, patient diagnosis and results of clinical assessment     Frequency/Duration: 3x/ week x 6 weeks  Specific OT Treatment to include:      [x]? Instruction in HEP                   Modalities:  [x]?  Therapeutic Exercise                 [x]? Ultrasound               []? Electrical Stimulation/Attended  [x]? PROM/Stretching                    [x]? Fluidotherapy          [x]?   Paraffin wrist.   10) Patient will decrease QuickDASH score to 30% or less for increased participation in daily functional activities. Pain Level:    8/10 in the L wrist/ thumb aching, sensitive, sore,  tight/ pulling pains  Subjective: Pt presents with no new complaints  Objective:  Updated POC to be completed by 7/26/2021. INTERVENTION: COMPLETED: SPECIFICS/COMMENTS:   Modality:     US   To MP area dorsally 4 min and thumb thenar area 4 min. 3.3 MhZ, 20%, intensity . 08 to minimize scar adhesions and decrease post activity swelling   Fluidotherapy- L hand/ wrist X- 10 min Completed with gentle AROM thumb/ wrist as tolerated to increase pain free ROM and promote soft tissue elasticity        AROM:          Digital AROM X     x   - tendon glides/MP flex/ext,  Hook fist and then full fist- 10x    -towel ex w/ digits and thumb  -beans grasp and release - 5 min    L thumb AROM X   X  X          x    - IP and MP flex/ ext 15x  - AROM all planes 10x each  - in hand manipulation and place with 4 large discs  - exercise balls on table   -radial abduction with pom pom picking up-focus on IP flexion  - grasp and release cones with thumb ABD at widest part   L wrist AROM     --jux er ciser- ^ 4 reps using his entire arm   - wrist AROM all planes  - rolling ball up wall B  with wrist extension        L forearm rotation x Supination/ pronation   AAROM:     Wrist flex/ext/ deviations  x Completed as tolerated   L thumb AAROM x All planes        PROM/Stretching:     L thumb  X- 15 reps     IP/ MP flexion/ gentle composite flexion as tolerated      L gentle wrist all planes x         Scar Mass/Edema Control:     Scar and retrograde X  x - manual massage  -manual desensitization   - silicone gel provided to help scar   Mesh compression sleeve  x Continue on thumb to decrease pain and edema as needed.      Strengthening:     - gentle hand strengthening (NEW) x - soft putty/ gentle grasp and manipulate 10x  -soft putty/ push in discs with lateral pinch        Other:     Desensitization  X  Continue at home  massage with graded textures from smooth to slightly rough/ pt to continue at home for 10 min self massage - 2x/ day   Splint x Pt fit today with a large Comfort cool brace. Compliance is good. HEP   Reviewed all and given handout - see attached. Splint on and digital exer. And thumb IP flex/ext-see above.     x AROM- thumb extension, thumb ABD, IP blocking ex, supination/ pronation, wrist flexion/ extension/ encourage more self massage for scar mgmt and desensitization      Assessment/Comments: Pt is making Good progress toward stated plan of care. ROM continued with the addition of light hand strengthening. Will monitor response to the program upgrade and adjust as needed. -Rehab Potential: Good  -Requires OT Follow Up: Yes  Time In:  1300           Time Out: 1400            Visit #: 14      Treatment Charges: Mins Time in - Time out  Units   Modalities: Fluido 10 4586-3741 1   Ther Exercise 15 1325- 1340 1   Manual Therapy 15 1310- 1325 1   Thera Activities 20 5475-3716 1   ADL/Home Mgt       Neuro Re-education      Gait Training      Group Therapy      Non-Billable Service Time      Other: ortho fit      Total Time/Units 60  4     -Response to Treatment: Pt is concerned about his thumb more than the wrist. Support provided. Goals: Goals for pt can be see on initial eval occurring on 5/26/21, update 6-23-21. Plan:   [x]  Continues Plan of care: Continue AROM as tolerated, desesensitization , pain mgmt and functional hand activities. Wean from thumb splint for activities as tolerated. Advance into light hand strengthening. . Treatment covered based on POC and graduated to patient's progress. Pt education continues at each visit to obtain maximum benefits from skilled OT intervention.   []  [address] [facility] of care:   []  Discharge:      Luz Maria Forman OT/L  [id number]

## 2021-07-09 ENCOUNTER — TREATMENT (OUTPATIENT)
Dept: OCCUPATIONAL THERAPY | Age: 71
End: 2021-07-09
Payer: COMMERCIAL

## 2021-07-09 DIAGNOSIS — M18.12 ARTHRITIS OF CARPOMETACARPAL (CMC) JOINT OF LEFT THUMB: Primary | ICD-10-CM

## 2021-07-09 DIAGNOSIS — M65.4 TENOSYNOVITIS, DE QUERVAIN: ICD-10-CM

## 2021-07-09 PROCEDURE — 97140 MANUAL THERAPY 1/> REGIONS: CPT | Performed by: OCCUPATIONAL THERAPIST

## 2021-07-09 PROCEDURE — 97530 THERAPEUTIC ACTIVITIES: CPT | Performed by: OCCUPATIONAL THERAPIST

## 2021-07-09 PROCEDURE — 97110 THERAPEUTIC EXERCISES: CPT | Performed by: OCCUPATIONAL THERAPIST

## 2021-07-09 PROCEDURE — 97022 WHIRLPOOL THERAPY: CPT | Performed by: OCCUPATIONAL THERAPIST

## 2021-07-09 NOTE — PROGRESS NOTES
[facility] OCCUPATIONAL THERAPY   [address]  [facility] [address]  Dept: [address] OT Fax: [phone]    OCCUPATIONAL THERAPY PROGRESS NOTE    Date:  2021                          Initial Evaluation Date: 2021    Patient Name:  Josette Guzman. :  1950    Restrictions/Precautions:  See specifics listed below, Low fall risk  Diagnosis:    M65.4 (ICD-10-CM) - Tenosynovitis, de Quervain   M18.12 (ICD-10-CM) - Arthritis of carpometacarpal (CMC) joint of left thumb   Date of Surgery/Injury: 21 L thumb CMC arthroplasty revision, L FCR tenotomy/ debridement, L wrist arthropathy      Insurance/Certification information:  [facility] [id number]  Plan of care signed (Y/N): Y ( thru 21)  Visit# / total visits: 15 / 18 approved visits ( 1-15-21 to 21)     Referring Practitioner:  Dr Gregory James  Specific Practitioner Orders: [facility] advance in therapy. Wean out of his splint. Improve range of motion. May incorporate light strengthening at 8 weeks postop. Modalities as needed    Assessment of current deficits   []? Functional mobility             [x]? ADLs          [x]? Strength                  []? Cognition   []? Functional transfers           [x]? IADLs         []? Safety Awareness  []? Endurance   [x]? Fine Motor Coordination    []? Balance      []? Vision/perception   [x]? Sensation     []? Gross Motor Coordination [x]? ROM           [x]? Pain                        [x]? Edema          [x]? Scar Adhesion/Skin Integrity      OT PLAN OF CARE   OT POC based on physician orders, patient diagnosis and results of clinical assessment     Frequency/Duration: 3x/ week x 6 weeks  Specific OT Treatment to include:      [x]? Instruction in HEP                   Modalities:  [x]?  Therapeutic Exercise                 [x]? Ultrasound               []? Electrical Stimulation/Attended  [x]? PROM/Stretching                    [x]? Fluidotherapy          [x]?   Paraffin wrist.   10) Patient will decrease QuickDASH score to 30% or less for increased participation in daily functional activities. Pain Level:    8/10 in the L wrist/ thumb aching, sensitive, sore,  tight/ pulling pains  Subjective: \" Everything is moving but it really hurts. \"  Objective:  Updated POC to be completed by 7/26/2021. INTERVENTION: COMPLETED: SPECIFICS/COMMENTS:   Modality:     US   To MP area dorsally 4 min and thumb thenar area 4 min. 3.3 MhZ, 20%, intensity . 08 to minimize scar adhesions and decrease post activity swelling   Fluidotherapy- L hand/ wrist X- 10 min Completed with gentle AROM thumb/ wrist as tolerated to increase pain free ROM and promote soft tissue elasticity        AROM:          Digital AROM X     x   - tendon glides/MP flex/ext,  Hook fist and then full fist- 10x    -towel ex w/ digits and thumb  -beans grasp and release - 5 min    L thumb AROM X   X               - IP and MP flex/ ext 15x  - AROM all planes 10x each  - in hand manipulation and place with 4 large discs  - exercise balls on table   -radial abduction with pom pom picking up-focus on IP flexion  - grasp and release cones with thumb ABD at widest part   L wrist AROM  X  X  x   --jux er ciser- ^ 4 reps using his entire arm   - wrist AROM all planes  - rolling ball up on table B  with wrist extension        L forearm rotation x Supination/ pronation   AAROM:     Wrist flex/ext/ deviations  x Completed as tolerated   L thumb AAROM x All planes        PROM/Stretching:     L thumb  X- 15 reps     IP/ MP flexion/ gentle composite flexion as tolerated      L gentle wrist all planes x         Scar Mass/Edema Control:     Scar and retrograde X  x - manual massage  -manual desensitization   - silicone gel provided to help scar   Mesh compression sleeve  x Continue on thumb to decrease pain and edema as needed.      Strengthening:     - gentle hand strengthening  x - soft putty/ gentle grasp and manipulate 10x  -soft putty/ push in discs with lateral pinch        Other:     Desensitization  X  Continue at home  massage with graded textures from smooth to slightly rough/ pt to continue at home for 10 min self massage - 2x/ day   Splint x Pt fit today with a large Comfort cool brace. Compliance is good. HEP   Reviewed all and given handout - see attached. Splint on and digital exer. And thumb IP flex/ext-see above.     x AROM- thumb extension, thumb ABD, IP blocking ex, supination/ pronation, wrist flexion/ extension/ encourage more self massage for scar mgmt and desensitization      Assessment/Comments: Pt is making Good progress toward stated plan of care. ROM and light hand strengthening continued. Will advance as tolerated. -Rehab Potential: Good  -Requires OT Follow Up: Yes  Time In:  1300           Time Out: 1400            Visit #: 15      Treatment Charges: Mins Time in - Time out  Units   Modalities: Fluido 10 5758-8310 1   Ther Exercise 15 1325- 1340 1   Manual Therapy 15 1310- 1325 1   Thera Activities 20 0943-0443 1   ADL/Home Mgt       Neuro Re-education      Gait Training      Group Therapy      Non-Billable Service Time      Other: ortho fit      Total Time/Units 60  4     -Response to Treatment: Pt is concerned about his thumb more than the wrist. Support provided. Goals: Goals for pt can be see on initial eval occurring on 5/26/21, update 6-23-21. Plan:   [x]  Continues Plan of care: Continue AROM as tolerated, desesensitization , pain mgmt and functional hand activities. Wean from thumb splint for activities as tolerated. Advance into light hand strengthening. . Treatment covered based on POC and graduated to patient's progress. Pt education continues at each visit to obtain maximum benefits from skilled OT intervention.   []  [address] [facility] of care:   []  Discharge:      Jasper Rodriguez, OT/L  [id number]

## 2021-07-12 ENCOUNTER — TREATMENT (OUTPATIENT)
Dept: OCCUPATIONAL THERAPY | Age: 71
End: 2021-07-12
Payer: COMMERCIAL

## 2021-07-12 DIAGNOSIS — M65.4 TENOSYNOVITIS, DE QUERVAIN: ICD-10-CM

## 2021-07-12 DIAGNOSIS — M18.12 ARTHRITIS OF CARPOMETACARPAL (CMC) JOINT OF LEFT THUMB: Primary | ICD-10-CM

## 2021-07-12 PROCEDURE — 97140 MANUAL THERAPY 1/> REGIONS: CPT | Performed by: OCCUPATIONAL THERAPIST

## 2021-07-12 PROCEDURE — 97110 THERAPEUTIC EXERCISES: CPT | Performed by: OCCUPATIONAL THERAPIST

## 2021-07-12 PROCEDURE — 97530 THERAPEUTIC ACTIVITIES: CPT | Performed by: OCCUPATIONAL THERAPIST

## 2021-07-12 PROCEDURE — 97022 WHIRLPOOL THERAPY: CPT | Performed by: OCCUPATIONAL THERAPIST

## 2021-07-12 NOTE — PROGRESS NOTES
[x]? Fluidotherapy          [x]?   Paraffin                   [x]? AAROM  [x]?  AROM                 []? Iontophoresis:   [x]? Curt Sawyer                                       []? Neuromuscular Re-Ed            []? ADL/IADL re-training    [x]?  Therapeutic Activity                  [x]? Pain Management with/without modalities PRN                 [x]?  Manual Therapy                      [x]? Splinting                                   [x]? Scar Management                   []?Joint Protection/Training  []? Ergonomics                             []? Joint Mobilization                      []? Adaptive Equipment Assessment/Training                             [x]?  Manual Edema Mobilization   []? Myofascial Release                 []? Energy Conservation/Work Simplification  [x]? FM Coordination           []? Safety retraining/education per  individual diagnosis/goals  [x]? Desensitization                                   GOALS (Long term same as Short term):  1) Patient will demonstrate good understanding of home program (exercises/activities/diagnosis/prognosis/goals) with good accuracy. 2) Patient will demonstrate increased active/passive range of motion of their left thumb / Suni Sos WFLs for ADL/IADL completion. 3) Patient will demonstrate /pinch strength of at least 40 / 5 pinch pounds of their left hand. 4) Patient to report decreased pain in their affected left distal  upper extremity from 9/10 to 3/10 or less with resistive functional use. 5) Increase in fine motor function as evidenced by time to complete 9-hole peg test by < 30  Seconds and improved ability to perform fasteners. 6) Patient to report a decrease in hypersensitivity from 100% to 20% or less in their left thumb/ wrist.   7) Patient to demonstrate decreased guarding of their affected extremity from 100% to 20% or less. 8) Patient will demonstrate a non-tender/non-adherent scar.    9) Patient will report ADL/ IADL  functions as Mod I/I using the affected left hand/ wrist.   10) Patient will decrease QuickDASH score to 30% or less for increased participation in daily functional activities. Pain Level:    8/10 in the L wrist/ thumb aching, sensitive, sore,  tight/ pulling pains  Subjective: \" That thumb is the bad part. It hurts so bad but I don't know why. \"  Objective:  Updated POC to be completed by 7/26/2021. INTERVENTION: COMPLETED: SPECIFICS/COMMENTS:   Modality:     US   To MP area dorsally 4 min and thumb thenar area 4 min. 3.3 MhZ, 20%, intensity . 08 to minimize scar adhesions and decrease post activity swelling   Fluidotherapy- L hand/ wrist X- 10 min Completed with gentle AROM thumb/ wrist as tolerated to increase pain free ROM and promote soft tissue elasticity        AROM:          Digital AROM X     x   - tendon glides/MP flex/ext,  Hook fist and then full fist- 10x    -towel ex w/ digits and thumb  -beans grasp and release - 5 min    L thumb AROM X   X               - IP and MP flex/ ext 15x  - AROM all planes 10x each  - in hand manipulation and place with 4 large discs  - exercise balls on table   -radial abduction with pom pom picking up-focus on IP flexion  - grasp and release cones with thumb ABD at widest part   L wrist AROM  X  X  x   --jux edna cisedna- ^ 4 reps using his entire arm   - wrist AROM all planes  - rolling ball up on table B  with wrist extension        L forearm rotation x Supination/ pronation   AAROM:     Wrist flex/ext/ deviations  x Completed as tolerated   L thumb AAROM x All planes        PROM/Stretching:     L thumb  X- 15 reps     IP/ MP flexion/ gentle composite flexion as tolerated      L gentle wrist all planes x         Scar Mass/Edema Control:     Scar and retrograde X  x - manual massage  -manual desensitization   - silicone gel provided to help scar   Mesh compression sleeve  x Continue on thumb to decrease pain and edema as needed.      Strengthening:     - gentle hand strengthening - soft putty/ gentle grasp and manipulate 10x  -soft putty/ push in discs with lateral pinch        Other:     Desensitization  X  Continue at home  massage with graded textures from smooth to slightly rough/ pt to continue at home for 10 min self massage - 2x/ day   Splint x Pt fit today with a large Comfort cool brace. Compliance is good. HEP   Reviewed all and given handout - see attached. Splint on and digital exer. And thumb IP flex/ext-see above.     x AROM- thumb extension, thumb ABD, IP blocking ex, supination/ pronation, wrist flexion/ extension/ encourage more self massage for scar mgmt and desensitization      Assessment/Comments: Pt is making Good progress toward stated plan of care. All thumb movement is decreased today due to pain and a mild increase in swelling. In general, pt has shown improvements in wrist and thumb movement over the last couple weeks. He has weaned from the hard splint but is now intermittently using a Comfort Cool brace due to pain. The main issue affecting recovery  is pain and nerve related symptoms. Status is updated below as pt can tolerate. However, his normal ROM is better than today. Pt is scheduled for return to the MD tomorrow. Question whether a steroid may help to calm his condition. Pt has 2 remaining approved OT visits left. Additional Therapy is highly recommended.     AROM:   Thumb radial ext 0-50  Thumb flexion -5 cm  Opposition intact to each digit except the small finger  IP 0-45  Thumb palmar ABD 0-45     Wrist flexion 0-60 (WFL passive)  Wrist ext 0-30 (0-50 passive)    * pt is unable to tolerate hand strength testing today    -Rehab Potential: Good  -Requires OT Follow Up: Yes  Time In:  1300           Time Out: 1400            Visit #: 16      Treatment Charges: Mins Time in - Time out  Units   Modalities: Fluido 10 4313-1215 1   Ther Exercise 15 1325- 1340 1   Manual Therapy 15 1310- 1325 1   Thera Activities 20 0395-2761 1   ADL/Home Mgt       Neuro Re-education      Gait Training      Group Therapy      Non-Billable Service Time      Other: ortho fit      Total Time/Units 60  4     -Response to Treatment: Pt is concerned about his thumb more than the wrist. Support provided. Goals: Goals for pt can be see on initial eval occurring on 5/26/21, update 6-23-21. Plan:   [x]  Continues Plan of care: Additional OT recommended. Continue AROM as tolerated, desesensitization , pain mgmt and functional hand activities. Wean from thumb splint for activities as tolerated. Advance  Hand and wrist  strengthening. . Treatment covered based on POC and graduated to patient's progress. Pt education continues at each visit to obtain maximum benefits from skilled OT intervention.   []  [facility] of care:   []  Discharge:      Cari Hooker OT/L  [id number]

## 2021-07-13 ENCOUNTER — OFFICE VISIT (OUTPATIENT)
Dept: ORTHOPEDIC SURGERY | Age: 71
End: 2021-07-13

## 2021-07-13 VITALS — RESPIRATION RATE: 20 BRPM | BODY MASS INDEX: 24.88 KG/M2 | HEIGHT: 69 IN | WEIGHT: 168 LBS

## 2021-07-13 DIAGNOSIS — M18.12 ARTHRITIS OF CARPOMETACARPAL (CMC) JOINT OF LEFT THUMB: Primary | ICD-10-CM

## 2021-07-13 PROCEDURE — 99024 POSTOP FOLLOW-UP VISIT: CPT | Performed by: ORTHOPAEDIC SURGERY

## 2021-07-13 RX ORDER — AMITRIPTYLINE HYDROCHLORIDE 10 MG/1
10 TABLET, FILM COATED ORAL NIGHTLY
Qty: 30 TABLET | Refills: 0 | Status: SHIPPED
Start: 2021-07-13 | End: 2021-08-04

## 2021-07-13 RX ORDER — METHYLPREDNISOLONE 4 MG/1
TABLET ORAL
Qty: 1 KIT | Refills: 0 | Status: SHIPPED | OUTPATIENT
Start: 2021-07-13 | End: 2021-07-19

## 2021-07-13 NOTE — PROGRESS NOTES
9 weeks postop left thumb revision arthroplasty with use of Arthrex mini tight rope as well as wrist arthroscopy with debridement of central TFCC tear. He is still hypersensitive around the thumb the thenar eminence and first webspace dorsally. Pain at the arthroplasty site has improved significantly. He is also still complaining of pain at night. He is unable to sleep. Has been taking gabapentin at night and Percocet. He is also trying Voltaren gel. His exam: Scars are maturing. Hypersensitive to the thumb, thenar eminence, and first webspace dorsally. Minimal pain at the arthroplasty site. . Minimal tenderness over the TFCC. He has full pronation supination of the forearm. Significant limitation and pain with wrist extension. He is able to flex the wrist without discomfort. He is able to oppose the thumb to the ring finger and is 1 cm from the small finger. He remains neurovascular intact. Assessment: 9 weeks postop    Plan    Discussed getting approval for compound cream to help with his hypersensitivity. We will also prescribe Elavil to be taken at night for pain. He may continue to take his Percocet as needed for pain at night. May advance in therapy. Wean out of his splint. Improve range of motion. May incorporate light strengthening. Modalities as needed. Follow-up 4  Weeks. .      I have seen and evaluated the patient and agree with the above assessment and plan on today's visit. I have performed the key components of the history and physical examination with significant findings of 9 weeks postop with continued scar sensitivity. He does report improving range of motion and function. Patient inquired about medication to help him sleep at night. Is having difficulties sleeping. He reports no improvement with his gabapentin. Patient was given a prescription for Elavil. Specially compounded cream for nerve sensitivity was also prescribed. . I concur with the findings and plan as documented.     Sergio Navarrete MD  7/13/2021

## 2021-07-22 ENCOUNTER — TREATMENT (OUTPATIENT)
Dept: OCCUPATIONAL THERAPY | Age: 71
End: 2021-07-22
Payer: COMMERCIAL

## 2021-07-22 DIAGNOSIS — M65.4 TENOSYNOVITIS, DE QUERVAIN: ICD-10-CM

## 2021-07-22 DIAGNOSIS — M18.12 ARTHRITIS OF CARPOMETACARPAL (CMC) JOINT OF LEFT THUMB: Primary | ICD-10-CM

## 2021-07-22 PROCEDURE — 97140 MANUAL THERAPY 1/> REGIONS: CPT | Performed by: OCCUPATIONAL THERAPIST

## 2021-07-22 PROCEDURE — 97530 THERAPEUTIC ACTIVITIES: CPT | Performed by: OCCUPATIONAL THERAPIST

## 2021-07-22 PROCEDURE — 97022 WHIRLPOOL THERAPY: CPT | Performed by: OCCUPATIONAL THERAPIST

## 2021-07-22 PROCEDURE — 97110 THERAPEUTIC EXERCISES: CPT | Performed by: OCCUPATIONAL THERAPIST

## 2021-07-22 NOTE — PROGRESS NOTES
[facility] OCCUPATIONAL THERAPY   [address]  [facility] [address]  Dept: [phone] Mayetta Narayan OT Fax: [phone]    OCCUPATIONAL THERAPY   PROGRESS NOTE/ UPDATE    Date:  2021                          Initial Evaluation Date: 2021    Patient Name:  Fred Marie. :  1950    Restrictions/Precautions:  See specifics listed below, Low fall risk  Diagnosis:    M65.4 (ICD-10-CM) - Tenosynovitis, de Quervain   M18.12 (ICD-10-CM) - Arthritis of carpometacarpal (CMC) joint of left thumb   Date of Surgery/Injury: 21 L thumb CMC arthroplasty revision, L FCR tenotomy/ debridement, L wrist arthropathy      Insurance/Certification information:  [facility] [id number]  Plan of care signed (Y/N): Y ( thru 21)  Visit# / total visits:  approved visits ( 1-15-21 to 21 via date extension approval)      Referring Practitioner:  Dr Evonne Kessler  Specific Practitioner Orders: [facility] advance in therapy. Wean out of his splint. Improve range of motion. May incorporate light strengthening at 8 weeks postop. Modalities as needed    Assessment of current deficits   []? Functional mobility             [x]? ADLs          [x]? Strength                  []? Cognition   []? Functional transfers           [x]? IADLs         []? Safety Awareness  []? Endurance   [x]? Fine Motor Coordination    []? Balance      []? Vision/perception   [x]? Sensation     []? Gross Motor Coordination [x]? ROM           [x]? Pain                        [x]? Edema          [x]? Scar Adhesion/Skin Integrity      OT PLAN OF CARE   OT POC based on physician orders, patient diagnosis and results of clinical assessment     Frequency/Duration: 3x/ week x 6 weeks  Specific OT Treatment to include:      [x]? Instruction in HEP                   Modalities:  [x]?  Therapeutic Exercise                 [x]? Ultrasound               []? Electrical Stimulation/Attended  [x]? PROM/Stretching                    [x]? Fluidotherapy          [x]?   Paraffin                   [x]? AAROM  [x]?  AROM                 []? Iontophoresis:   [x]? Radhames Hallman                                       []? Neuromuscular Re-Ed            []? ADL/IADL re-training    [x]?  Therapeutic Activity                  [x]? Pain Management with/without modalities PRN                 [x]?  Manual Therapy                      [x]? Splinting                                   [x]? Scar Management                   []?Joint Protection/Training  []? Ergonomics                             []? Joint Mobilization                      []? Adaptive Equipment Assessment/Training                             [x]?  Manual Edema Mobilization   []? Myofascial Release                 []? Energy Conservation/Work Simplification  [x]? FM Coordination           []? Safety retraining/education per  individual diagnosis/goals  [x]? Desensitization                                   GOALS (Long term same as Short term):  1) Patient will demonstrate good understanding of home program (exercises/activities/diagnosis/prognosis/goals) with good accuracy. 2) Patient will demonstrate increased active/passive range of motion of their left thumb / [employer] Mini WFLs for ADL/IADL completion. 3) Patient will demonstrate /pinch strength of at least 40 / 5 pinch pounds of their left hand. 4) Patient to report decreased pain in their affected left distal  upper extremity from 9/10 to 3/10 or less with resistive functional use. 5) Increase in fine motor function as evidenced by time to complete 9-hole peg test by < 30  Seconds and improved ability to perform fasteners. 6) Patient to report a decrease in hypersensitivity from 100% to 20% or less in their left thumb/ wrist.   7) Patient to demonstrate decreased guarding of their affected extremity from 100% to 20% or less. 8) Patient will demonstrate a non-tender/non-adherent scar.    9) Patient will report ADL/ IADL  functions as Mod I/I using the affected left hand/ wrist.   10) Patient will decrease QuickDASH score to 30% or less for increased participation in daily functional activities. Pain Level:    8/10 in the L wrist/ thumb aching, sensitive, sore,  tight/ pulling pains  Subjective: \" That thumb is the bad part. It hurts so bad but I don't know why. \"  Objective:  Updated POC to be completed by 7/26/2021. INTERVENTION: COMPLETED: SPECIFICS/COMMENTS:   Modality:     US   To MP area dorsally 4 min and thumb thenar area 4 min. 3.3 MhZ, 20%, intensity . 08 to minimize scar adhesions and decrease post activity swelling   Fluidotherapy- L hand/ wrist X- 10 min Completed with gentle AROM thumb/ wrist as tolerated to increase pain free ROM and promote soft tissue elasticity        AROM:          Digital AROM X     x   - tendon glides/MP flex/ext,  Hook fist and then full fist- 10x    -towel ex w/ digits and thumb  -beans grasp and release - 5 min    L thumb AROM X   X               - IP and MP flex/ ext 15x  - AROM all planes 10x each  - in hand manipulation and place with 4 large discs  - exercise balls on table   -radial abduction with pom pom picking up-focus on IP flexion  - grasp and release cones with thumb ABD at widest part   L wrist AROM    X  x   --jux er ciser- ^ 4 reps using his entire arm   - wrist AROM all planes  - rolling ball up on table B  with wrist extension        L forearm rotation x Supination/ pronation   AAROM:     Wrist flex/ext/ deviations  x Completed as tolerated   L thumb AAROM x All planes        PROM/Stretching:     L thumb  X- 15 reps     IP/ MP flexion/ gentle composite flexion as tolerated      L gentle wrist all planes x         Scar Mass/Edema Control:     Scar and retrograde X  x - manual massage  -manual desensitization   - silicone gel provided to help scar   Mesh compression sleeve  x Continue on thumb to decrease pain and edema as needed.      Strengthening:     - gentle hand strengthening   - soft putty/ gentle grasp and manipulate 10x  -soft putty/ push in discs with lateral pinch        Other:     Desensitization  X  Continue at home  massage with graded textures from smooth to slightly rough/ pt to continue at home for 10 min self massage - 2x/ day   Splint x Pt fit today with a large Comfort cool brace. Compliance is good. HEP   Reviewed all and given handout - see attached. Splint on and digital exer. And thumb IP flex/ext-see above.     x AROM- thumb extension, thumb ABD, IP blocking ex, supination/ pronation, wrist flexion/ extension/ encourage more self massage for scar mgmt and desensitization      Assessment/Comments: Pt is making Good progress toward stated plan of care. Pt is seen today following being on vacation for a week. Pain levels in the thumb and wrist continues to be elevated . Pt reports being prescribed medication by Dr Marlyn Peterson (steroid and compound cream). However, he states he hasn't started the steroid yet. Will continue and advance strengthening as tolerated. -Rehab Potential: Good  -Requires OT Follow Up: Yes  Time In:  1500           Time Out: 1600            Visit #: 17      Treatment Charges: Mins Time in - Time out  Units   Modalities: Fluido 10 4177-7406 1   Ther Exercise 15 5596-1376 1   Manual Therapy 15 4460-8432 1   Thera Activities 20 6473-0609 1   ADL/Home Mgt       Neuro Re-education      Gait Training      Group Therapy      Non-Billable Service Time      Other: ortho fit      Total Time/Units 60  4     -Response to Treatment: Pt is concerned about his wrist pain. The importance of starting the steroid discussed. Goals: Goals for pt can be seen on initial eval occurring on 5/26/21, update 6-23-21 and 7-12-21. Plan:   [x]  Continue Plan of care: Additional OT recommended. Will contact MD office regarding getting a new C9. Continue AROM as tolerated, desesensitization , pain mgmt and functional hand activities. Advance Hand and wrist  strengthening. . Treatment covered based on POC and graduated to patient's progress. Pt education continues at each visit to obtain maximum benefits from skilled OT intervention.   []  [facility] of care:   []  Discharge:      Davi Mae OT/JOELLE  [id number]

## 2021-07-26 DIAGNOSIS — G89.18 POST-OP PAIN: Primary | ICD-10-CM

## 2021-07-26 RX ORDER — HYDROCODONE BITARTRATE AND ACETAMINOPHEN 5; 325 MG/1; MG/1
1 TABLET ORAL EVERY 6 HOURS PRN
Qty: 28 TABLET | Refills: 0 | Status: SHIPPED | OUTPATIENT
Start: 2021-07-26 | End: 2021-08-02

## 2021-07-26 NOTE — TELEPHONE ENCOUNTER
Patient is requesting a medication refill, OARRS complete. Patient is 11 weeks out from surgery of left thumb revision arthroplasty with use of Arthrex mini tight rope as well as wrist arthroscopy with debridement of central TFCC tear. Patient was last seen on 7-13-21 and patients next appointment is 8-19-21. Patient was last given Norco 5-325 q6hr PRN on 7-6-21.

## 2021-07-28 ENCOUNTER — TREATMENT (OUTPATIENT)
Dept: OCCUPATIONAL THERAPY | Age: 71
End: 2021-07-28
Payer: COMMERCIAL

## 2021-07-28 DIAGNOSIS — S60.222A CONTUSION OF LEFT HAND, INITIAL ENCOUNTER: ICD-10-CM

## 2021-07-28 DIAGNOSIS — M18.12 ARTHRITIS OF CARPOMETACARPAL (CMC) JOINT OF LEFT THUMB: Primary | ICD-10-CM

## 2021-07-28 DIAGNOSIS — M65.4 TENOSYNOVITIS, DE QUERVAIN: ICD-10-CM

## 2021-07-28 PROCEDURE — 97110 THERAPEUTIC EXERCISES: CPT | Performed by: OCCUPATIONAL THERAPIST

## 2021-07-28 PROCEDURE — 97140 MANUAL THERAPY 1/> REGIONS: CPT | Performed by: OCCUPATIONAL THERAPIST

## 2021-07-28 PROCEDURE — 97022 WHIRLPOOL THERAPY: CPT | Performed by: OCCUPATIONAL THERAPIST

## 2021-07-28 PROCEDURE — 97530 THERAPEUTIC ACTIVITIES: CPT | Performed by: OCCUPATIONAL THERAPIST

## 2021-07-28 NOTE — PROGRESS NOTES
[facility] OCCUPATIONAL THERAPY   [address]  [facility] [address]  Dept: [address] Fax: [phone]    OCCUPATIONAL THERAPY   PROGRESS NOTE    Date:  2021                          Initial Evaluation Date: 2021    Patient Name:  Manuela Joshi. :  1950    Restrictions/Precautions:  See specifics listed below, Low fall risk  Diagnosis:    M65.4 (ICD-10-CM) - Tenosynovitis, de Quervain   M18.12 (ICD-10-CM) - Arthritis of carpometacarpal (CMC) joint of left thumb   Date of Surgery/Injury: 21 L thumb CMC arthroplasty revision, L FCR tenotomy/ debridement, L wrist arthropathy      Insurance/Certification information:  Paolo Campuzano [id number]  Plan of care signed (Y/N): Y ( thru 21)  Visit# / total visits:  (extension through - approved visits ( 1-15-21 to 21 via date extension approval)      Referring Practitioner:  Dr Skip Bah  Specific Practitioner Orders: [facility] advance in therapy. Wean out of his splint. Improve range of motion. May incorporate light strengthening at 8 weeks postop. Modalities as needed    Assessment of current deficits   []? Functional mobility             [x]? ADLs          [x]? Strength                  []? Cognition   []? Functional transfers           [x]? IADLs         []? Safety Awareness  []? Endurance   [x]? Fine Motor Coordination    []? Balance      []? Vision/perception   [x]? Sensation     []? Gross Motor Coordination [x]? ROM           [x]? Pain                        [x]? Edema          [x]? Scar Adhesion/Skin Integrity      OT PLAN OF CARE   OT POC based on physician orders, patient diagnosis and results of clinical assessment     Frequency/Duration: 3x/ week x 6 weeks  Specific OT Treatment to include:      [x]? Instruction in HEP                   Modalities:  [x]?  Therapeutic Exercise                 [x]? Ultrasound               []? Electrical Stimulation/Attended  [x]? PROM/Stretching                    [x]? Fluidotherapy          [x]?   Paraffin                   [x]? AAROM  [x]?  AROM                 []? Iontophoresis:   [x]? Loman Gell                                       []? Neuromuscular Re-Ed            []? ADL/IADL re-training    [x]?  Therapeutic Activity                  [x]? Pain Management with/without modalities PRN                 [x]?  Manual Therapy                      [x]? Splinting                                   [x]? Scar Management                   []?Joint Protection/Training  []? Ergonomics                             []? Joint Mobilization                      []? Adaptive Equipment Assessment/Training                             [x]?  Manual Edema Mobilization   []? Myofascial Release                 []? Energy Conservation/Work Simplification  [x]? FM Coordination           []? Safety retraining/education per  individual diagnosis/goals  [x]? Desensitization                                   GOALS (Long term same as Short term):  1) Patient will demonstrate good understanding of home program (exercises/activities/diagnosis/prognosis/goals) with good accuracy. 2) Patient will demonstrate increased active/passive range of motion of their left thumb / [facility] for ADL/IADL completion. 3) Patient will demonstrate /pinch strength of at least 40 / 5 pinch pounds of their left hand. 4) Patient to report decreased pain in their affected left distal  upper extremity from 9/10 to 3/10 or less with resistive functional use. 5) Increase in fine motor function as evidenced by time to complete 9-hole peg test by < 30  Seconds and improved ability to perform fasteners. 6) Patient to report a decrease in hypersensitivity from 100% to 20% or less in their left thumb/ wrist.   7) Patient to demonstrate decreased guarding of their affected extremity from 100% to 20% or less.    8) Patient will demonstrate a non-tender/non-adherent scar. 9) Patient will report ADL/ IADL  functions as Mod I/I using the affected left hand/ wrist.   10) Patient will decrease QuickDASH score to 30% or less for increased participation in daily functional activities. Pain Level:    8/10 in the L wrist/ thumb aching, sensitive, sore,  tight/ pulling pains  Subjective: \"My hand feels swollen and really tender \"  Objective:  Updated POC to be completed by 7/26/2021. INTERVENTION: COMPLETED: SPECIFICS/COMMENTS:   Modality:     US   To MP area dorsally 4 min and thumb thenar area 4 min. 3.3 MhZ, 20%, intensity . 08 to minimize scar adhesions and decrease post activity swelling   Fluidotherapy- L hand/ wrist X- 10 min Completed with gentle AROM thumb/ wrist as tolerated to increase pain free ROM and promote soft tissue elasticity        AROM:          Digital AROM X       x - tendon glides/MP flex/ext,  Hook fist and then full fist- 10x    -towel ex w/ digits and thumb  -beans grasp and release - 5 min    L thumb AROM X   X        X        X    x     - IP and MP flex/ ext 15x  - AROM all planes 10x each  - in hand manipulation and place with 4 large discs  - exercise balls on table   -radial abduction with pom pom picking up-focus on IP flexion  - grasp and release cones with thumb ABD at widest part  -vision occluded thumb ROM exercises within beans-stereoagnosis  -playing cards-pushing cards off deck one by one with palmar abduction and IP flexion   L wrist AROM  x  X     --jux er ciser- ^ 4 reps using his entire arm   - wrist AROM all planes  - rolling ball up on table B  with wrist extension        L forearm rotation x Supination/ pronation   AAROM:     Wrist flex/ext/ deviations  x Completed as tolerated   L thumb AAROM x All planes        PROM/Stretching:     L thumb  X- 15 reps     IP/ MP flexion/ gentle composite flexion as tolerated      L gentle wrist all planes x         Scar Mass/Edema Control:     Scar and retrograde X  x - Continue AROM as tolerated, desesensitization , pain mgmt and functional hand activities. Advance Hand and wrist  strengthening. . Treatment covered based on POC and graduated to patient's progress. Pt education continues at each visit to obtain maximum benefits from skilled OT intervention. []  Alter Plan of care:   []  Discharge:       [facility], OTR/L #[id number]

## 2021-08-03 ENCOUNTER — TREATMENT (OUTPATIENT)
Dept: OCCUPATIONAL THERAPY | Age: 71
End: 2021-08-03
Payer: COMMERCIAL

## 2021-08-03 DIAGNOSIS — S60.222A CONTUSION OF LEFT HAND, INITIAL ENCOUNTER: ICD-10-CM

## 2021-08-03 DIAGNOSIS — M18.12 ARTHRITIS OF CARPOMETACARPAL (CMC) JOINT OF LEFT THUMB: Primary | ICD-10-CM

## 2021-08-03 DIAGNOSIS — M65.4 TENOSYNOVITIS, DE QUERVAIN: ICD-10-CM

## 2021-08-03 PROCEDURE — 97140 MANUAL THERAPY 1/> REGIONS: CPT | Performed by: OCCUPATIONAL THERAPIST

## 2021-08-03 PROCEDURE — 97110 THERAPEUTIC EXERCISES: CPT | Performed by: OCCUPATIONAL THERAPIST

## 2021-08-03 PROCEDURE — 97022 WHIRLPOOL THERAPY: CPT | Performed by: OCCUPATIONAL THERAPIST

## 2021-08-03 PROCEDURE — 97530 THERAPEUTIC ACTIVITIES: CPT | Performed by: OCCUPATIONAL THERAPIST

## 2021-08-03 NOTE — PROGRESS NOTES
[facility] OCCUPATIONAL THERAPY   [address]  [facility] [address]  Dept: [address] OT Fax: [phone]    OCCUPATIONAL THERAPY   PROGRESS NOTE    Date:  8/3/2021                          Initial Evaluation Date: 2021    Patient Name:  Sima Méndez :  1950    Restrictions/Precautions:  See specifics listed below, Low fall risk  Diagnosis:    M65.4 (ICD-10-CM) - Tenosynovitis, de Quervain   M18.12 (ICD-10-CM) - Arthritis of carpometacarpal (CMC) joint of left thumb   Date of Surgery/Injury: 21 L thumb CMC arthroplasty revision, L FCR tenotomy/ debridement, L wrist arthropathy    (12 week post op)  Insurance/Certification information:  [facility]  Plan of care signed (Y/N): Y ( thru 21)  Visit# / total visits:  (extension through - approved visits ( 1-15-21 to 21 via date extension approval)      Referring Practitioner:  Dr Lindsey Peguero  Specific Practitioner Orders: [facility] advance in therapy. Wean out of his splint. Improve range of motion. May incorporate light strengthening at 8 weeks postop. Modalities as needed    Assessment of current deficits   []? Functional mobility             [x]? ADLs          [x]? Strength                  []? Cognition   []? Functional transfers           [x]? IADLs         []? Safety Awareness  []? Endurance   [x]? Fine Motor Coordination    []? Balance      []? Vision/perception   [x]? Sensation     []? Gross Motor Coordination [x]? ROM           [x]? Pain                        [x]? Edema          [x]? Scar Adhesion/Skin Integrity      OT PLAN OF CARE   OT POC based on physician orders, patient diagnosis and results of clinical assessment     Frequency/Duration: 3x/ week x 6 weeks  Specific OT Treatment to include:      [x]? Instruction in HEP                   Modalities:  [x]?  Therapeutic Exercise                 [x]? Ultrasound               []? Electrical Stimulation/Attended  [x]? PROM/Stretching                    [x]? Fluidotherapy          [x]?   Paraffin                   [x]? AAROM  [x]?  AROM                 []? Iontophoresis:   [x]? Cyd Kocher                                       []? Neuromuscular Re-Ed            []? ADL/IADL re-training    [x]?  Therapeutic Activity                  [x]? Pain Management with/without modalities PRN                 [x]?  Manual Therapy                      [x]? Splinting                                   [x]? Scar Management                   []?Joint Protection/Training  []? Ergonomics                             []? Joint Mobilization                      []? Adaptive Equipment Assessment/Training                             [x]?  Manual Edema Mobilization   []? Myofascial Release                 []? Energy Conservation/Work Simplification  [x]? FM Coordination           []? Safety retraining/education per  individual diagnosis/goals  [x]? Desensitization                                   GOALS (Long term same as Short term):  1) Patient will demonstrate good understanding of home program (exercises/activities/diagnosis/prognosis/goals) with good accuracy. 2) Patient will demonstrate increased active/passive range of motion of their left thumb / [place] WFLs for ADL/IADL completion. 3) Patient will demonstrate /pinch strength of at least 40 / 5 pinch pounds of their left hand. 4) Patient to report decreased pain in their affected left distal  upper extremity from 9/10 to 3/10 or less with resistive functional use. 5) Increase in fine motor function as evidenced by time to complete 9-hole peg test by < 30  Seconds and improved ability to perform fasteners. 6) Patient to report a decrease in hypersensitivity from 100% to 20% or less in their left thumb/ wrist.   7) Patient to demonstrate decreased guarding of their affected extremity from 100% to 20% or less.    8) Patient will demonstrate a non-tender/non-adherent scar. 9) Patient will report ADL/ IADL  functions as Mod I/I using the affected left hand/ wrist.   10) Patient will decrease QuickDASH score to 30% or less for increased participation in daily functional activities. Pain Level:    8/10 in the L wrist/ thumb aching, sensitive, sore,  tight/ pulling pains  Subjective: \"I hope I get rid of these sharp pains sometime. \"  Objective:  Updated POC to be completed by 8/12/2021. INTERVENTION: COMPLETED: SPECIFICS/COMMENTS:   Modality:     US  x To MP area dorsally 4 min and thumb thenar area 4 min. 3.3 MhZ, 20%, intensity . 08 to minimize scar adhesions and decrease post activity swelling   Fluidotherapy- L hand/ wrist X- 10 min Completed with gentle AROM thumb/ wrist as tolerated to increase pain free ROM and promote soft tissue elasticity        AROM:          Digital AROM X    x   - tendon glides/MP flex/ext,  Hook fist and then full fist- 10x    -towel ex w/ digits and thumb  -beans grasp and release - 5 min    L thumb AROM X   X                        x   - IP and MP flex/ ext 15x  - AROM all planes 10x each  - in hand manipulation and place with 4 large discs  - exercise balls on table   -radial abduction with pom pom picking up-focus on IP flexion  - grasp and release cones with thumb ABD at widest part  -vision occluded thumb ROM exercises within beans-stereoagnosis  -playing cards-pushing cards off deck one by one with palmar abduction and IP flexion  - opposition tasks - marbles to IF and MF 10 rep's the added to RF 10 more devang fair. L wrist AROM  x  X  x   --jux er ciser-  3  reps using his entire arm   - wrist AROM all planes  - rolling blue ball up on table to stretch wrist extension and flex- hold 5 sec end range.         L forearm rotation x Supination/ pronation   AAROM:     Wrist flex/ext/ deviations  x Completed as tolerated   L thumb AAROM x All planes        PROM/Stretching:     L thumb  X- 15 reps     IP/ MP flexion/ gentle composite flexion as tolerated      L gentle wrist all planes x         Scar Mass/Edema Control:     Scar and retrograde X  x - manual massage  -manual desensitization   - silicone gel provided to help scar   Mesh compression sleeve  x Continue on thumb to decrease pain and edema as needed. Strengthening:     - gentle hand strengthening   - soft putty/ gentle grasp and manipulate 10x  -soft putty/ push in discs with lateral pinch   Wrist/forearm strengthening x - 1# wt wrist flex. Ext, RD - UD and forearm rotation 5 rep's with sm arc ROM devang Fair +   Other:     Desensitization  X  Continue at home  massage with graded textures from smooth to slightly rough/ pt to continue at home for 10 min self massage - 2x/ day   Splint x Pt fit today with a large Comfort cool brace. Compliance is good. HEP   Reviewed all and given handout - see attached. Splint on and digital exer. And thumb IP flex/ext-see above.     x AROM- thumb extension, thumb ABD, IP blocking ex, supination/ pronation, wrist flexion/ extension/ encourage more self massage for scar mgmt and desensitization      Assessment/Comments: Pt is making Good progress toward stated plan of care. Pt tolerated session well with increased motion of the thumb in all planes of motion. Pt appeared to have more wrist extension this date - but continued pain end range. Pt tolerated 1# wrist strengthening well this date with sm arc ROM.    Will increase as tolerated      -Rehab Potential: Good  -Requires OT Follow Up: Yes  Time In:  1100 am          Time Out: 1200 pm           Visit #: 2  ( 17 used already)      Treatment Charges: Mins Time in - Time out  Units   Modalities: Fluido 10 11:00-11:10 1   Ther Exercise 15 11:25-11:40 1   Manual Therapy 15 11:10-11:25 1   Thera Activities 20 11:40-12:00 1   ADL/Home Mgt       Neuro Re-education      Gait Training      Group Therapy      Non-Billable Service Time      Other: ortho fit      Total Time/Units 60  4 -Response to Treatment: Pt did say this date that his pain is less then it was a month ago- and he is trying to use his hand for light tasks although it is not automatic. Goals: Goals for pt can be seen on initial eval occurring on 5/26/21, update 6-23-21 and 7-12-21. Plan:   [x]  Continue Plan of care: Additional OT recommended. Will contact MD office regarding getting a new C9. Continue AROM as tolerated, desesensitization , pain mgmt and functional hand activities. Advance Hand and wrist  strengthening. . Treatment covered based on POC and graduated to patient's progress. Pt education continues at each visit to obtain maximum benefits from skilled OT intervention.   []  Alter Plan of care:   []  Discharge:      Ronald Sanabria OT/L, CHT

## 2021-08-04 DIAGNOSIS — M18.12 ARTHRITIS OF CARPOMETACARPAL (CMC) JOINT OF LEFT THUMB: ICD-10-CM

## 2021-08-04 RX ORDER — AMITRIPTYLINE HYDROCHLORIDE 10 MG/1
TABLET, FILM COATED ORAL
Qty: 30 TABLET | Refills: 0 | Status: SHIPPED
Start: 2021-08-04 | End: 2021-08-31

## 2021-08-05 ENCOUNTER — TREATMENT (OUTPATIENT)
Dept: OCCUPATIONAL THERAPY | Age: 71
End: 2021-08-05
Payer: COMMERCIAL

## 2021-08-05 DIAGNOSIS — M65.4 TENOSYNOVITIS, DE QUERVAIN: ICD-10-CM

## 2021-08-05 DIAGNOSIS — S60.222A CONTUSION OF LEFT HAND, INITIAL ENCOUNTER: ICD-10-CM

## 2021-08-05 DIAGNOSIS — M18.12 ARTHRITIS OF CARPOMETACARPAL (CMC) JOINT OF LEFT THUMB: Primary | ICD-10-CM

## 2021-08-05 PROCEDURE — 97530 THERAPEUTIC ACTIVITIES: CPT | Performed by: OCCUPATIONAL THERAPIST

## 2021-08-05 PROCEDURE — 97110 THERAPEUTIC EXERCISES: CPT | Performed by: OCCUPATIONAL THERAPIST

## 2021-08-05 PROCEDURE — 97022 WHIRLPOOL THERAPY: CPT | Performed by: OCCUPATIONAL THERAPIST

## 2021-08-05 PROCEDURE — 97140 MANUAL THERAPY 1/> REGIONS: CPT | Performed by: OCCUPATIONAL THERAPIST

## 2021-08-05 NOTE — PROGRESS NOTES
[facility] OCCUPATIONAL THERAPY   [address]  [facility] [address]  Dept: [address] OT Fax: [phone]    OCCUPATIONAL THERAPY   PROGRESS NOTE    Date:  2021                          Initial Evaluation Date: 2021    Patient Name:  Rita Paulino. :  1950    Restrictions/Precautions:  See specifics listed below, Low fall risk  Diagnosis:    M65.4 (ICD-10-CM) - Tenosynovitis, de Quervain   M18.12 (ICD-10-CM) - Arthritis of carpometacarpal (CMC) joint of left thumb   Date of Surgery/Injury: 21 L thumb CMC arthroplasty revision, L FCR tenotomy/ debridement, L wrist arthropathy    (12 week post op)  Insurance/Certification information:  [facility]  Plan of care signed (Y/N): Y ( thru 21)  Visit# / total visits: 3 /18 (extension through - approved visits ( 1-15-21 to 21 via date extension approval)      Referring Practitioner:  Dr Josh Hodgkin  Specific Practitioner Orders: [facility] advance in therapy. Wean out of his splint. Improve range of motion. May incorporate light strengthening at 8 weeks postop. Modalities as needed    Assessment of current deficits   []? Functional mobility             [x]? ADLs          [x]? Strength                  []? Cognition   []? Functional transfers           [x]? IADLs         []? Safety Awareness  []? Endurance   [x]? Fine Motor Coordination    []? Balance      []? Vision/perception   [x]? Sensation     []? Gross Motor Coordination [x]? ROM           [x]? Pain                        [x]? Edema          [x]? Scar Adhesion/Skin Integrity      OT PLAN OF CARE   OT POC based on physician orders, patient diagnosis and results of clinical assessment     Frequency/Duration: 3x/ week x 6 weeks  Specific OT Treatment to include:      [x]? Instruction in HEP                   Modalities:  [x]?  Therapeutic Exercise                 [x]? Ultrasound               []? Electrical Stimulation/Attended  [x]? PROM/Stretching                    [x]? Fluidotherapy          [x]?   Paraffin                   [x]? AAROM  [x]?  AROM                 []? Iontophoresis:   [x]? Gina Tello                                       []? Neuromuscular Re-Ed            []? ADL/IADL re-training    [x]?  Therapeutic Activity                  [x]? Pain Management with/without modalities PRN                 [x]?  Manual Therapy                      [x]? Splinting                                   [x]? Scar Management                   []?Joint Protection/Training  []? Ergonomics                             []? Joint Mobilization                      []? Adaptive Equipment Assessment/Training                             [x]?  Manual Edema Mobilization   []? Myofascial Release                 []? Energy Conservation/Work Simplification  [x]? FM Coordination           []? Safety retraining/education per  individual diagnosis/goals  [x]? Desensitization                                   GOALS (Long term same as Short term):  1) Patient will demonstrate good understanding of home program (exercises/activities/diagnosis/prognosis/goals) with good accuracy. 2) Patient will demonstrate increased active/passive range of motion of their left thumb / [place] WFLs for ADL/IADL completion. 3) Patient will demonstrate /pinch strength of at least 40 / 5 pinch pounds of their left hand. 4) Patient to report decreased pain in their affected left distal  upper extremity from 9/10 to 3/10 or less with resistive functional use. 5) Increase in fine motor function as evidenced by time to complete 9-hole peg test by < 30  Seconds and improved ability to perform fasteners. 6) Patient to report a decrease in hypersensitivity from 100% to 20% or less in their left thumb/ wrist.   7) Patient to demonstrate decreased guarding of their affected extremity from 100% to 20% or less.    8) Patient will demonstrate a non-tender/non-adherent scar. 9) Patient will report ADL/ IADL  functions as Mod I/I using the affected left hand/ wrist.   10) Patient will decrease QuickDASH score to 30% or less for increased participation in daily functional activities. Pain Level:    7-8/10 in the L wrist/ thumb aching, sensitive, sore,  tight/ pulling pains  Subjective: Pt states \"Still super sensitive over that scar but my swelling has gone down\"  Objective:  Updated POC to be completed by 8/12/2021. INTERVENTION: COMPLETED: SPECIFICS/COMMENTS:   Modality:     US  x To MP area dorsally 4 min and thumb thenar area 4 min. 3.3 MhZ, 20%, intensity . 08 to minimize scar adhesions and decrease post activity swelling   Fluidotherapy- L hand/ wrist X- 10 min Completed with gentle AROM thumb/ wrist as tolerated to increase pain free ROM and promote soft tissue elasticity        AROM:          Digital AROM X    x   - tendon glides/MP flex/ext,  Hook fist and then full fist- 10x    -towel ex w/ digits and thumb  -beans grasp and release - 5 min    L thumb AROM X   X                        x   - IP and MP flex/ ext 15x  - AROM all planes 10x each  - in hand manipulation and place with 4 large discs  - exercise balls on table   -radial abduction with pom pom picking up-focus on IP flexion  - grasp and release cones with thumb ABD at widest part  -vision occluded thumb ROM exercises within beans-stereoagnosis  -playing cards-pushing cards off deck one by one with palmar abduction and IP flexion  - opposition tasks - marbles to IF and MF 10 rep's the added to RF 10 more devang fair. L wrist AROM  x  X  x   --jux er ciser-  3  reps using his entire arm   - wrist AROM all planes  - rolling blue ball up on table to stretch wrist extension and flex- hold 5 sec end range.         L forearm rotation x Supination/ pronation   AAROM:     Wrist flex/ext/ deviations  x Completed as tolerated   L thumb AAROM x All planes        PROM/Stretching:     L thumb  X- Time/Units 60  4     -Response to Treatment: Pt did say this date that his pain is less then it was a month ago- and he is trying to use his hand for light tasks although it is not automatic. Goals: Goals for pt can be seen on initial eval occurring on 5/26/21, update 6-23-21 and 7-12-21. Plan:   [x]  Continue Plan of care: Additional OT recommended. Will contact MD office regarding getting a new C9. Continue AROM as tolerated, desesensitization , pain mgmt and functional hand activities. Advance Hand and wrist  strengthening. . Treatment covered based on POC and graduated to patient's progress. Pt education continues at each visit to obtain maximum benefits from skilled OT intervention. []  Alter Plan of care:   []  Discharge:       [facility], OTR/L #[id number]

## 2021-08-10 ENCOUNTER — TREATMENT (OUTPATIENT)
Dept: OCCUPATIONAL THERAPY | Age: 71
End: 2021-08-10
Payer: COMMERCIAL

## 2021-08-10 DIAGNOSIS — M18.12 ARTHRITIS OF CARPOMETACARPAL (CMC) JOINT OF LEFT THUMB: Primary | ICD-10-CM

## 2021-08-10 DIAGNOSIS — M65.4 TENOSYNOVITIS, DE QUERVAIN: ICD-10-CM

## 2021-08-10 PROCEDURE — 97530 THERAPEUTIC ACTIVITIES: CPT | Performed by: OCCUPATIONAL THERAPIST

## 2021-08-10 PROCEDURE — 97022 WHIRLPOOL THERAPY: CPT | Performed by: OCCUPATIONAL THERAPIST

## 2021-08-10 PROCEDURE — 97110 THERAPEUTIC EXERCISES: CPT | Performed by: OCCUPATIONAL THERAPIST

## 2021-08-10 PROCEDURE — 97140 MANUAL THERAPY 1/> REGIONS: CPT | Performed by: OCCUPATIONAL THERAPIST

## 2021-08-10 NOTE — PROGRESS NOTES
[facility] OCCUPATIONAL THERAPY   [address]  [place] [address]  Dept: [address] OT Fax: [phone]    OCCUPATIONAL THERAPY   PROGRESS NOTE/ RE-EVALUATION    Date:  8/10/2021                          Initial Evaluation Date: 2021    Patient Name:  Gideon Altman :  1950    Restrictions/Precautions: Activity as tolerated, Low fall risk  Diagnosis:    M65.4 (ICD-10-CM) - Tenosynovitis, de Quervain   M18.12 (ICD-10-CM) - Arthritis of carpometacarpal (CMC) joint of left thumb   Date of Surgery/Injury: 21 L thumb CMC arthroplasty revision, L FCR tenotomy/ debridement, L wrist arthropathy     Insurance/Certification information:  [address] [place] [address] [phone]  Plan of care signed (Y/N): Y ( thru 21)  Visit# / total visits:  (New C9  to 21) Completed 17 prior visits from  to      Referring Practitioner:  Dr Braxton Razo  Specific Practitioner Orders: [facility] advance in therapy. Wean out of his splint. Improve range of motion. May incorporate light strengthening at 8 weeks postop. Modalities as needed    Assessment of current deficits   []? Functional mobility             [x]? ADLs          [x]? Strength                  []? Cognition   []? Functional transfers           [x]? IADLs         []? Safety Awareness  []? Endurance   [x]? Fine Motor Coordination    []? Balance      []? Vision/perception   [x]? Sensation     []? Gross Motor Coordination [x]? ROM           [x]? Pain                        [x]? Edema          [x]? Scar Adhesion/Skin Integrity      OT PLAN OF CARE   OT POC based on physician orders, patient diagnosis and results of clinical assessment     Frequency/Duration: 3x/ week x 6 weeks  Specific OT Treatment to include:      [x]? Instruction in HEP                   Modalities:  [x]?  Therapeutic Exercise                 [x]? Ultrasound               []? Electrical Stimulation/Attended  [x]? PROM/Stretching                    [x]? Fluidotherapy          [x]?   Paraffin                   [x]? AAROM  [x]?  AROM                 []? Iontophoresis:   [x]? Princess Artis                                       []? Neuromuscular Re-Ed            []? ADL/IADL re-training    [x]?  Therapeutic Activity                  [x]? Pain Management with/without modalities PRN                 [x]?  Manual Therapy                      [x]? Splinting                                   [x]? Scar Management                   []?Joint Protection/Training  []? Ergonomics                             []? Joint Mobilization                      []? Adaptive Equipment Assessment/Training                             [x]?  Manual Edema Mobilization   []? Myofascial Release                 []? Energy Conservation/Work Simplification  [x]? FM Coordination           []? Safety retraining/education per  individual diagnosis/goals  [x]? Desensitization                                   GOALS (Long term same as Short term):  1) Patient will demonstrate good understanding of home program (exercises/activities/diagnosis/prognosis/goals) with good accuracy. 2) Patient will demonstrate increased active/passive range of motion of their left thumb / Tinoco Kalyan WFLs for ADL/IADL completion. 3) Patient will demonstrate /pinch strength of at least 40 / 5 pinch pounds of their left hand. 4) Patient to report decreased pain in their affected left distal  upper extremity from 9/10 to 3/10 or less with resistive functional use. 5) Increase in fine motor function as evidenced by time to complete 9-hole peg test by < 30  Seconds and improved ability to perform fasteners. 6) Patient to report a decrease in hypersensitivity from 100% to 20% or less in their left thumb/ wrist.   7) Patient to demonstrate decreased guarding of their affected extremity from 100% to 20% or less. 8) Patient will demonstrate a non-tender/non-adherent scar.    9) Patient will report ADL/ IADL  functions as Mod I/I using the affected left hand/ wrist.   10) Patient will decrease QuickDASH score to 30% or less for increased participation in daily functional activities. Pain Level:    7-8/10 in the L wrist/ thumb aching, sensitive, sore,  tight/ pulling pains  Subjective: Pt presents with no new complaints. Objective:  Updated POC to be completed by 9/12/2021. INTERVENTION: COMPLETED: SPECIFICS/COMMENTS:   Modality:     US  x To MP area dorsally 4 min and thumb thenar area 4 min. 3.3 MhZ, 20%, intensity . 08 to minimize scar adhesions and decrease post activity swelling   Fluidotherapy- L hand/ wrist X- 10 min Completed with gentle AROM thumb/ wrist as tolerated to increase pain free ROM and promote soft tissue elasticity        AROM:          Digital AROM X    x   - tendon glides/MP flex/ext,  Hook fist and then full fist- 10x    -towel ex w/ digits and thumb  -beans grasp and release - 5 min    L thumb AROM X   X  x             - IP and MP flex/ ext 15x  - AROM all planes 10x each  - in hand manipulation and place with [employer] pieces  - exercise balls on table   - grasp and release cones with thumb ABD at widest part  -playing cards-pushing cards off deck one by one with palmar abduction and IP flexion     L wrist AROM    X  x   --jux er ciser-  3  reps using his entire arm   - wrist AROM all planes  - rolling weighted on table to stretch wrist extension and flex- hold 5 sec end range/ supination-pronation        L forearm rotation x Supination/ pronation   AAROM:     Wrist flex/ext/ deviations  x Completed as tolerated   L thumb AAROM x All planes        PROM/Stretching:     L thumb  X- 15 reps     IP/ MP flexion/ gentle composite flexion as tolerated      L gentle wrist all planes x         Scar Mass/Edema Control:     Scar and retrograde X  x - manual massage  -manual desensitization   - silicone gel provided to help scar   Mesh compression sleeve  x Continue on thumb to decrease pain and edema as needed. Strengthening:     - gentle hand strengthening   - soft putty/ gentle grasp and manipulate 10x  -soft putty/ push in discs with lateral pinch  -picking up 1# weighted ball 10 reps   Wrist/forearm strengthening  - 1# wt wrist flex. Ext, RD - UD and forearm rotation 5 rep's with sm arc ROM devang Fair +   Other:     Desensitization  X  Continue at home  massage with graded textures from smooth to slightly rough/ pt to continue at home for 10 min self massage - 2x/ day   Splint x Pt fit today with a large Comfort cool brace. Compliance is good. HEP   Reviewed all and given handout - see attached. Splint on and digital exer. And thumb IP flex/ext-see above.     x AROM- thumb extension, thumb ABD, IP blocking ex, supination/ pronation, wrist flexion/ extension/ encourage more self massage for scar mgmt and desensitization      Assessment/Comments: Pt is making Good progress toward stated plan of care. Status retested as follows:    AROM: update from 7-12-21  Thumb radial ext 0-50 (no change)  Thumb flexion -5 cm to -4 cm  Opposition intact to each digit except the small finger (no change)  IP 0-45 to decreased to  0-40  Thumb palmar ABD 0-45  To 0-50     Wrist flexion 0-60 to 0-70  Wrist ext 0-30 to 0-35     - 4#  Lateral pinch- 2.5#    Nine Hole test- 46 sec     GOALS (Long term same as Short term):  1) Patient will demonstrate good understanding of home program (exercises/activities/diagnosis/prognosis/goals) with good accuracy. (ongoing including ROM, stretches, hand strengthening)     2) Patient will demonstrate increased active/passive range of motion of their left thumb / Genaro Jamels WFLs for ADL/IADL completion. (slow progress as stated above)    3) Patient will demonstrate /pinch strength of at least 40 / 5 pinch pounds of their left hand.  (slow progress as stated above)    4) Patient to report decreased pain in their affected left distal  upper extremity from 9/10 to 3/10 or less with resistive functional use. (slow progress- pain averages 6-8/10 with light activity)    5) Increase in fine motor function as evidenced by time to complete 9-hole peg test by < 30  Seconds and improved ability to perform fasteners. ( goal not met- 46 sec)    6) Patient to report a decrease in hypersensitivity from 100% to 20% or less in their left thumb/ wrist. (sensitivity has slowly improved to 65% following steroid)    7) Patient to demonstrate decreased guarding of their affected extremity from 100% to 20% or less. (guarding continues at approximately 70%)    8) Patient will demonstrate a non-tender/non-adherent scar. (tenderness continues- goal not met)    9) Patient will report ADL/ IADL  functions as Mod I/I using the affected left hand/ wrist. ( Pt is starting to use his affected hand for light ADL activity only)     10) Patient will decrease QuickDASH score to 30% or less for increased participation in daily functional activities. (TBA)      -Rehab Potential: Good  -Requires OT Follow Up: Yes  Time In:  1005        Time Out: 1100           Visit #: 4  ( 17 used already)      Treatment Charges: Mins Time in - Time out  Units   Modalities: Fluido 10 2154-6571 1   Ther Exercise 15 3510-3084 1   Manual Therapy 10 5153-4393 1   Thera Activities 20 6643-2788 1   ADL/Home Mgt       Neuro Re-education      Gait Training      Group Therapy      Non-Billable Service Time      Other: ortho fit      Total Time/Units 60  4     -Response to Treatment: Pt did say this date that his pain is less then it was a month ago- and he is trying to use his hand for light tasks although it is not automatic. Goals: Goals for pt can be seen on initial eval occurring on 5/26/21, update 6-23-21 and 7-12-21. Plan:   [x]  Continue Plan of care: Continue OT pain mgmt, ROM, FM tasks and strengthening. Treatment covered based on POC and graduated to patient's progress.  Pt education continues at each visit to obtain maximum benefits from skilled OT intervention.   []  [facility] of care:   []  Discharge:      Navya Collins OT/L  [id number]

## 2021-08-12 ENCOUNTER — TREATMENT (OUTPATIENT)
Dept: OCCUPATIONAL THERAPY | Age: 71
End: 2021-08-12
Payer: COMMERCIAL

## 2021-08-12 DIAGNOSIS — M65.4 TENOSYNOVITIS, DE QUERVAIN: ICD-10-CM

## 2021-08-12 DIAGNOSIS — M18.12 ARTHRITIS OF CARPOMETACARPAL (CMC) JOINT OF LEFT THUMB: Primary | ICD-10-CM

## 2021-08-12 PROCEDURE — 97110 THERAPEUTIC EXERCISES: CPT | Performed by: OCCUPATIONAL THERAPIST

## 2021-08-12 PROCEDURE — 97140 MANUAL THERAPY 1/> REGIONS: CPT | Performed by: OCCUPATIONAL THERAPIST

## 2021-08-12 PROCEDURE — 97022 WHIRLPOOL THERAPY: CPT | Performed by: OCCUPATIONAL THERAPIST

## 2021-08-12 PROCEDURE — 97530 THERAPEUTIC ACTIVITIES: CPT | Performed by: OCCUPATIONAL THERAPIST

## 2021-08-12 NOTE — PROGRESS NOTES
[facility] OCCUPATIONAL THERAPY   [address]  [facility] [address]  Dept: [address] OT Fax: [phone]    OCCUPATIONAL THERAPY   PROGRESS NOTE    Date:  2021                          Initial Evaluation Date: 2021    Patient Name:  Avila Schaeffer. :  1950    Restrictions/Precautions: Activity as tolerated, Low fall risk  Diagnosis:    M65.4 (ICD-10-CM) - Tenosynovitis, de Quervain   M18.12 (ICD-10-CM) - Arthritis of carpometacarpal (CMC) joint of left thumb   Date of Surgery/Injury: 21 L thumb CMC arthroplasty revision, L FCR tenotomy/ debridement, L wrist arthropathy     Insurance/Certification information:  [address] [place] [address] [phone]  Plan of care signed (Y/N): Y ( thru 21)  Visit# / total visits:  (New C9  to 21) Completed 17 prior visits from  to      Referring Practitioner:  Dr Sathya Godfrey  Specific Practitioner Orders: [facility] advance in therapy. Wean out of his splint. Improve range of motion. May incorporate light strengthening at 8 weeks postop. Modalities as needed    Assessment of current deficits   []? Functional mobility             [x]? ADLs          [x]? Strength                  []? Cognition   []? Functional transfers           [x]? IADLs         []? Safety Awareness  []? Endurance   [x]? Fine Motor Coordination    []? Balance      []? Vision/perception   [x]? Sensation     []? Gross Motor Coordination [x]? ROM           [x]? Pain                        [x]? Edema          [x]? Scar Adhesion/Skin Integrity      OT PLAN OF CARE   OT POC based on physician orders, patient diagnosis and results of clinical assessment     Frequency/Duration: 3x/ week x 6 weeks  Specific OT Treatment to include:      [x]? Instruction in HEP                   Modalities:  [x]?  Therapeutic Exercise                 [x]? Ultrasound               []? Electrical Stimulation/Attended  [x]? PROM/Stretching                    [x]? Fluidotherapy          [x]?   Paraffin                   [x]? AAROM  [x]?  AROM                 []? Iontophoresis:   [x]? Mar Davis                                       []? Neuromuscular Re-Ed            []? ADL/IADL re-training    [x]?  Therapeutic Activity                  [x]? Pain Management with/without modalities PRN                 [x]?  Manual Therapy                      [x]? Splinting                                   [x]? Scar Management                   []?Joint Protection/Training  []? Ergonomics                             []? Joint Mobilization                      []? Adaptive Equipment Assessment/Training                             [x]?  Manual Edema Mobilization   []? Myofascial Release                 []? Energy Conservation/Work Simplification  [x]? FM Coordination           []? Safety retraining/education per  individual diagnosis/goals  [x]? Desensitization                                   GOALS (Long term same as Short term):  1) Patient will demonstrate good understanding of home program (exercises/activities/diagnosis/prognosis/goals) with good accuracy. 2) Patient will demonstrate increased active/passive range of motion of their left thumb / Tara Mouse WFLs for ADL/IADL completion. 3) Patient will demonstrate /pinch strength of at least 40 / 5 pinch pounds of their left hand. 4) Patient to report decreased pain in their affected left distal  upper extremity from 9/10 to 3/10 or less with resistive functional use. 5) Increase in fine motor function as evidenced by time to complete 9-hole peg test by < 30  Seconds and improved ability to perform fasteners. 6) Patient to report a decrease in hypersensitivity from 100% to 20% or less in their left thumb/ wrist.   7) Patient to demonstrate decreased guarding of their affected extremity from 100% to 20% or less. 8) Patient will demonstrate a non-tender/non-adherent scar.    9) Patient will report ADL/ IADL  functions as Mod I/I using the affected left hand/ wrist.   10) Patient will decrease QuickDASH score to 30% or less for increased participation in daily functional activities. Pain Level:    7/10 in the L wrist/ thumb  sensitive, sore,  tight/ pulling pains  Subjective: Pt presents with no new complaints. Objective:  Updated POC to be completed by 9/12/2021. INTERVENTION: COMPLETED: SPECIFICS/COMMENTS:   Modality:     US   To MP area dorsally 4 min and thumb thenar area 4 min. 3.3 MhZ, 20%, intensity . 08 to minimize scar adhesions and decrease post activity swelling   Fluidotherapy- L hand/ wrist X- 10 min Completed with gentle AROM thumb/ wrist as tolerated to increase pain free ROM and promote soft tissue elasticity        AROM:          Digital AROM X    x   - tendon glides/MP flex/ext,  Hook fist and then full fist- 10x    -towel ex w/ digits and thumb  -beans grasp and release - 5 min    L thumb AROM X   X  x    x         - IP and MP flex/ ext 15x  - AROM all planes 10x each  - in hand manipulation and place with xsmall [employer] pieces  - exercise balls   - grasp and release cones with thumb ABD at widest part  -playing cards-pushing cards off deck one by one with palmar abduction and IP flexion     L wrist AROM  x  X     --jux er ciser-  ^ to 5  reps using less compensation for the wrist  - wrist AROM all planes  - rolling weighted on table to stretch wrist extension and flex- hold 5 sec end range/ supination-pronation        L forearm rotation x Supination/ pronation   AAROM:     Wrist flex/ext/ deviations  x Completed as tolerated   L thumb AAROM x All planes        PROM/Stretching:     L thumb  X- 15 reps     IP/ MP flexion/ gentle composite flexion as tolerated      L gentle wrist all planes x         Scar Mass/Edema Control:     Scar and retrograde X  x - manual massage  -manual desensitization   - silicone gel provided to help scar   Mesh compression sleeve   Continue on thumb to decrease pain and edema as needed. Strengthening:     - gentle hand strengthening  X    x      x - soft putty/ gentle grasp and manipulate 10x  - soft putty taffy pull 10x up/ 10x down  -soft putty/ push in discs with lateral pinch  -picking up 1# weighted ball 10 reps  - velcro board with key pinch   Wrist/forearm strengthening  - 1# wt wrist flex. Ext, RD - UD and forearm rotation 5 rep's with sm arc ROM devang Fair +   Other:     Desensitization  X  Continue at home  massage with graded textures from smooth to slightly rough/ pt to continue at home for 10 min self massage - 2x/ day   Splint x Pt fit today with a large Comfort cool brace. Compliance is good. HEP   Reviewed all and given handout - see attached. Splint on and digital exer. And thumb IP flex/ext-see above.     x AROM- thumb extension, thumb ABD, IP blocking ex, supination/ pronation, wrist flexion/ extension/ encourage more self massage for scar mgmt and desensitization      Assessment/Comments: Pt is making Good progress toward stated plan of care. Tolerance for ROM and light strengthening is better today.      -Rehab Potential: Good  -Requires OT Follow Up: Yes  Time In:  1005        Time Out: 1100           Visit #: 5  ( 17 used already)      Treatment Charges: Mins Time in - Time out  Units   Modalities: Fluido 10 0335-1070 1   Ther Exercise 15 3712-8320 1   Manual Therapy 10 7803-9516 1   Thera Activities 20 0023-0142 1   ADL/Home Mgt       Neuro Re-education      Gait Training      Group Therapy      Non-Billable Service Time      Other: ortho fit      Total Time/Units 60  4     -Response to Treatment: Pt encouraged by his progress today and states it is a better day. Goals: Goals for pt can be seen on initial eval occurring on 5/26/21, update 6-23-21 and 7-12-21. Plan:   [x]  Continue Plan of care: Continue OT pain mgmt, ROM, FM tasks and wrist/ hand strengthening. Treatment covered based on POC and graduated to patient's progress.  Pt education continues at each visit to obtain maximum benefits from skilled OT intervention.   []  [address] of care:   []  Discharge:      Nohelia Connelly OT/L  [id number]

## 2021-08-18 ENCOUNTER — TREATMENT (OUTPATIENT)
Dept: OCCUPATIONAL THERAPY | Age: 71
End: 2021-08-18
Payer: COMMERCIAL

## 2021-08-18 ENCOUNTER — TELEPHONE (OUTPATIENT)
Dept: ORTHOPEDIC SURGERY | Age: 71
End: 2021-08-18

## 2021-08-18 DIAGNOSIS — M18.12 ARTHRITIS OF CARPOMETACARPAL (CMC) JOINT OF LEFT THUMB: Primary | ICD-10-CM

## 2021-08-18 DIAGNOSIS — M65.4 TENOSYNOVITIS, DE QUERVAIN: ICD-10-CM

## 2021-08-18 DIAGNOSIS — S60.222A CONTUSION OF LEFT HAND, INITIAL ENCOUNTER: ICD-10-CM

## 2021-08-18 PROCEDURE — 97110 THERAPEUTIC EXERCISES: CPT | Performed by: OCCUPATIONAL THERAPIST

## 2021-08-18 PROCEDURE — 97140 MANUAL THERAPY 1/> REGIONS: CPT | Performed by: OCCUPATIONAL THERAPIST

## 2021-08-18 PROCEDURE — 97022 WHIRLPOOL THERAPY: CPT | Performed by: OCCUPATIONAL THERAPIST

## 2021-08-18 PROCEDURE — 97530 THERAPEUTIC ACTIVITIES: CPT | Performed by: OCCUPATIONAL THERAPIST

## 2021-08-18 NOTE — PROGRESS NOTES
[facility] OCCUPATIONAL THERAPY   [address]  [facility] [address]  Dept: [address] OT Fax: [phone]    OCCUPATIONAL THERAPY   PROGRESS NOTE    Date:  2021                          Initial Evaluation Date: 2021    Patient Name:  Rita Paulino. :  1950    Restrictions/Precautions: Activity as tolerated, Low fall risk  Diagnosis:    M65.4 (ICD-10-CM) - Tenosynovitis, de Quervain   M18.12 (ICD-10-CM) - Arthritis of carpometacarpal (CMC) joint of left thumb   Date of Surgery/Injury: 21 L thumb CMC arthroplasty revision, L FCR tenotomy/ debridement, L wrist arthropathy     Insurance/Certification information:  Paolo Campuzano [id number]  Plan of care signed (Y/N): Y ( thru 21)  Visit# / total visits:  (New C9  to 21) Completed 17 prior visits from  to      Referring Practitioner:  Dr Josh Hodgkin  Specific Practitioner Orders: [facility] advance in therapy. Wean out of his splint. Improve range of motion. May incorporate light strengthening at 8 weeks postop. Modalities as needed    Assessment of current deficits   []? Functional mobility             [x]? ADLs          [x]? Strength                  []? Cognition   []? Functional transfers           [x]? IADLs         []? Safety Awareness  []? Endurance   [x]? Fine Motor Coordination    []? Balance      []? Vision/perception   [x]? Sensation     []? Gross Motor Coordination [x]? ROM           [x]? Pain                        [x]? Edema          [x]? Scar Adhesion/Skin Integrity      OT PLAN OF CARE   OT POC based on physician orders, patient diagnosis and results of clinical assessment     Frequency/Duration: 3x/ week x 6 weeks  Specific OT Treatment to include:      [x]? Instruction in HEP                   Modalities:  [x]?  Therapeutic Exercise                 [x]? Ultrasound               []? Electrical Stimulation/Attended  [x]? PROM/Stretching                    [x]? Fluidotherapy          [x]?   Paraffin                   [x]? AAROM  [x]?  AROM                 []? Iontophoresis:   [x]? David Daly                                       []? Neuromuscular Re-Ed            []? ADL/IADL re-training    [x]?  Therapeutic Activity                  [x]? Pain Management with/without modalities PRN                 [x]?  Manual Therapy                      [x]? Splinting                                   [x]? Scar Management                   []?Joint Protection/Training  []? Ergonomics                             []? Joint Mobilization                      []? Adaptive Equipment Assessment/Training                             [x]?  Manual Edema Mobilization   []? Myofascial Release                 []? Energy Conservation/Work Simplification  [x]? FM Coordination           []? Safety retraining/education per  individual diagnosis/goals  [x]? Desensitization                                   GOALS (Long term same as Short term):  1) Patient will demonstrate good understanding of home program (exercises/activities/diagnosis/prognosis/goals) with good accuracy. 2) Patient will demonstrate increased active/passive range of motion of their left thumb / Genaro Lemmings WFLs for ADL/IADL completion. 3) Patient will demonstrate /pinch strength of at least 40 / 5 pinch pounds of their left hand. 4) Patient to report decreased pain in their affected left distal  upper extremity from 9/10 to 3/10 or less with resistive functional use. 5) Increase in fine motor function as evidenced by time to complete 9-hole peg test by < 30  Seconds and improved ability to perform fasteners. 6) Patient to report a decrease in hypersensitivity from 100% to 20% or less in their left thumb/ wrist.   7) Patient to demonstrate decreased guarding of their affected extremity from 100% to 20% or less. 8) Patient will demonstrate a non-tender/non-adherent scar.    9) Patient will report ADL/ IADL  functions as Mod I/I using the affected left hand/ wrist.   10) Patient will decrease QuickDASH score to 30% or less for increased participation in daily functional activities. Pain Level:    7/10 in the L wrist/ thumb  sensitive, sore,  tight/ pulling pains  Subjective: Pt presents with no new complaints. Objective:  Updated POC to be completed by 9/12/2021. INTERVENTION: COMPLETED: SPECIFICS/COMMENTS:   Modality:     US   To MP area dorsally 4 min and thumb thenar area 4 min. 3.3 MhZ, 20%, intensity . 08 to minimize scar adhesions and decrease post activity swelling   Fluidotherapy- L hand/ wrist X- 10 min Completed with gentle AROM thumb/ wrist as tolerated to increase pain free ROM and promote soft tissue elasticity        AROM:          Digital AROM X    x   - tendon glides/MP flex/ext,  Hook fist and then full fist- 10x    -towel ex w/ digits and thumb  -beans grasp and release - 5 min    L thumb AROM X   X    x  x  x       - IP and MP flex/ ext 15x  - AROM all planes 10x each  - in hand manipulation and place with -small Pur[employer]engoe pegboard pieces (10 pieces set up)  -sponge rotation in hand forward and backward  - exercise balls   - grasp and release cones with thumb ABD at widest part  -playing cards-pushing cards off deck one by one with palmar abduction and IP flexion     L wrist AROM  x  X     --jux er ciser-  ^ to 5  reps using less compensation for the wrist  - wrist AROM all planes  - rolling weighted on table to stretch wrist extension and flex- hold 5 sec end range/ supination-pronation        L forearm rotation x Supination/ pronation   AAROM:     Wrist flex/ext/ deviations  x Completed as tolerated   L thumb AAROM x All planes        PROM/Stretching:     L thumb  X- 15 reps     IP/ MP flexion/ gentle composite flexion as tolerated      L gentle wrist all planes x         Scar Mass/Edema Control:     Scar and retrograde X  x - manual massage  -manual desensitization   - silicone gel provided to help scar   Mesh compression sleeve   Continue on thumb to decrease pain and edema as needed. Strengthening:     - gentle hand strengthening  X    x       - soft putty/ gentle grasp and manipulate 10x  - soft putty taffy pull 10x up/ 10x down  -soft putty/ push in discs with lateral pinch  -picking up 1# weighted ball 10 reps  - velcro board with key pinch   Wrist/forearm strengthening  - 1# wt wrist flex. Ext, RD - UD and forearm rotation 5 rep's with sm arc ROM devang Fair +   Other:     Desensitization  X  Continue at home  massage with graded textures from smooth to slightly rough/ pt to continue at home for 10 min self massage - 2x/ day   Splint x Pt fit today with a large Comfort cool brace. Compliance is good. HEP   Reviewed all and given handout - see attached. Splint on and digital exer. And thumb IP flex/ext-see above.     x AROM- thumb extension, thumb ABD, IP blocking ex, supination/ pronation, wrist flexion/ extension/ encourage more self massage for scar mgmt and desensitization      Assessment/Comments: Pt is making Good progress toward stated plan of care. Pt tolerated session well with increased endurance, ROM and tolerance with strengthening this date well. Pt sees referring physician tomorrow-will continue to progress as tolerated. -Rehab Potential: Good  -Requires OT Follow Up: Yes  Time In:  3:00        Time Out: 4:00           Visit #: 6  ( 17 used already)      Treatment Charges: Mins Time in - Time out  Units   Modalities: Fluido 10 305-315 1   Ther Exercise 15 325-340 1   Manual Therapy 10 315-325 1   Thera Activities 20 340-400 1   ADL/Home Mgt       Neuro Re-education      Gait Training      Group Therapy      Non-Billable Service Time      Other: ortho fit      Total Time/Units 60  4     -Response to Treatment: Pt encouraged by his progress today and states it is a better day. Goals: Goals for pt can be seen on initial eval occurring on 5/26/21, update 6-23-21 and 7-12-21.     Plan:   [x] Continue Plan of care: Continue OT pain mgmt, ROM, FM tasks and wrist/ hand strengthening. Treatment covered based on POC and graduated to patient's progress. Pt education continues at each visit to obtain maximum benefits from skilled OT intervention. []  Alter Plan of care:   []  Discharge:       Celia Mantilla OTR/L #[id number]

## 2021-08-19 ENCOUNTER — OFFICE VISIT (OUTPATIENT)
Dept: ORTHOPEDIC SURGERY | Age: 71
End: 2021-08-19
Payer: COMMERCIAL

## 2021-08-19 VITALS — RESPIRATION RATE: 18 BRPM | HEIGHT: 69 IN | BODY MASS INDEX: 24.88 KG/M2 | WEIGHT: 168 LBS

## 2021-08-19 DIAGNOSIS — M18.12 ARTHRITIS OF CARPOMETACARPAL (CMC) JOINT OF LEFT THUMB: Primary | ICD-10-CM

## 2021-08-19 PROCEDURE — 99213 OFFICE O/P EST LOW 20 MIN: CPT | Performed by: ORTHOPAEDIC SURGERY

## 2021-08-19 NOTE — PROGRESS NOTES
Chief Complaint   Patient presents with    Follow Up After Procedure     14 weeks out, Lt wrist scope TFCC repair, Lt thumb revision arthroplasty with mini tightrope         Gurdeep Wesley is a 65y.o. year old  who presents for follow up of left thumb revision arthroplasty with internal brace and wrist arthroscopy with debridement of TFCC tear. Overall reports things are improving. He still having sensitivity over his FCR tenotomy site. However all he reports he is improving. He would like to go back to work light duty. No use of his left hand.       Past Medical History:   Diagnosis Date    Cancer [facility])     prostate just recently DX July 2018    History of blood transfusion     1985 after \"ulcer surgery\"    Hyperlipidemia     Hypertension     Left wrist pain     Pain     low back, sacral     Past Surgical History:   Procedure Laterality Date    CATARACT REMOVAL WITH IMPLANT Right 07/31/2018    CATARACT REMOVAL WITH IMPLANT Left 07/02/2019    CYST REMOVAL      lt. axilla    HAND SURGERY      HEMORRHOID SURGERY      HERNIA REPAIR      INTRACAPSULAR CATARACT EXTRACTION Left 7/2/2019    LEFT EYE CATARACT EMULSIFICATION IOL IMPLANT - COMPLEX performed by Jose Perkins [facility] at [facility] Bilateral 08/17/2017    bilateral sacroiliac injection #1    NERVE BLOCK Bilateral 10/25/2017    Bilateral lumbar transforaminal epidural steroid injection    NERVE BLOCK Bilateral 11/02/2017    lumbar transforaminal epidural steriod injection #2    OTHER SURGICAL HISTORY  07-13-12    excision of cyst right 5th toe    ME XCAPSL CTRC RMVL INSJ IO LENS PROSTH W/O ECP Right 7/31/2018    CATARACT EXTRACTION WITH IOL RIGHT EYE performed [facility] at [facility]      ulcer repair    WRIST ARTHROPLASTY Left 5/12/2021    WITH REVISION CMC ARTHROPLASTY AND MINI TIGHTROPE APPLICATION APPLICATION EXTENSOR TENOSYNOVECTOMY AND FCR TENOTOMY  ; PARTIAL TRAPEZOIDECTOMY performed by Niki Ch MD at [facility] WRIST ARTHROSCOPY Left 5/12/2021    LEFT WRIST ARTHROSCOPY performed by Niki Ch MD at [facility] OR       Current Outpatient Medications:     amitriptyline (ELAVIL) 10 MG tablet, TAKE 1 TABLET BY MOUTH EVERY DAY AT NIGHT, Disp: 30 tablet, Rfl: 0    pantoprazole (PROTONIX) 40 MG tablet, TAKE 1 TABLET BY MOUTH EVERY DAY, Disp: , Rfl:     simvastatin (ZOCOR) 20 MG tablet, TAKE 1 TABLET BY MOUTH EVERY DAY, Disp: , Rfl:     diclofenac sodium (VOLTAREN) 1 % GEL, Apply 2 g topically 4 times daily, Disp: 2 Tube, Rfl: 1    amLODIPine (NORVASC) 10 MG tablet, Take 10 mg by mouth daily, Disp: , Rfl:     gabapentin (NEURONTIN) 100 MG capsule, Take 3 capsules by mouth 3 times daily for 30 days. Intended supply: 30 days, Disp: 270 capsule, Rfl: 0  Allergies   Allergen Reactions    Codeine Nausea And Vomiting and Other (See Comments)     Dizziness, light headed    Motrin [Ibuprofen Micronized] Nausea And Vomiting     GI upset     Social History     Socioeconomic History    Marital status:      Spouse name: Not on file    Number of children: Not on file    Years of education: Not on file    Highest education level: Not on file   Occupational History    Not on file   Tobacco Use    Smoking status: Never Smoker    Smokeless tobacco: Never Used   Vaping Use    Vaping Use: Never used   Substance and Sexual Activity    Alcohol use: No    Drug use: No    Sexual activity: Not on file   Other Topics Concern    Not on file   Social History Narrative    Not on file     Social Determinants of Health     Financial Resource Strain:     Difficulty of Paying Living Expenses:    Food Insecurity:     Worried About Running Out of Food in the Last Year:     [address] in the Last Year:    Transportation Needs:     Lack of Transportation (Medical):      Lack of Transportation (Non-Medical):    Physical Activity:     Days of Exercise per Week:     Minutes of Exercise per Session:    Stress:     Feeling of Stress :    Social Connections:     Frequency of Communication with Friends and Family:     Frequency of Social Gatherings with Friends and Family:     Attends [employer] Services:     Active Member of Clubs or Organizations:     Attends Club or Organization Meetings:     Marital Status:    Intimate Partner Violence:     Fear of Current or Ex-Partner:     Emotionally Abused:     Physically Abused:     Sexually Abused:      Family History   Problem Relation Age of Onset    Cancer Mother     Cancer Father        Skin: (-) rash,(-) psoriasis,(-) eczema, (-)skin cancer. Musculoskeletal: Left wrist pain  Neurologic: (-) epilepsy, (-)seizures,(-) brain tumor,(-) TIA, (-)stroke, (-)headaches, (-)Parkinson disease,(-) memory loss, (-) LOC. Cardiovascular: (-) Chest pain, (-) swelling in legs/feet, (-) SOB, (-) cramping in legs/feet with walking. SUBJECTIVE:      Constitutional:    The patient is alert and oriented x 3, appears to be stated age and in no distress. Left upper extremity: Nontender shoulder and elbow. Scars maturing nicely about his hand. Swelling is markedly improved. Remains with continued sensitivity mostly over his volar radial aspect of the wrist.  He has a negative CMC grind test but does provoke some discomfort. He is able to flex and extend the thumb IP joint. Opposition to the tip of the small finger. He does have some discomfort with terminal flexion of the thumb. Radial, ulnar, median nerves are grossly intact. He has palpable 2+ ulnar pulse. He has brisk cap refill of digits. Xrays: X-rays of his left hand AP lateral obliques demonstrate stable suspension plasty with use of the mini tight rope system. No significant further proximal migration.   Impression office x-rays: Stable left thumb arthroplasty with use of mini tight rope system  Radiographic findings reviewed with patient      Impression: Just over 3 months postop revision CMC joint arthroplasty with use of mini tight rope system, FCR tenotomy, and wrist arthroscopy with debridement of central TFCC tear    Plan:     Overall he is doing quite well. He is progressing in therapy. Remains with some scar sensitivity and some stiffness. He feels things are improving. He does feel safe returning to work one-handed only. He is using his left hand with light activities. Continue use of the Comfort Cool brace as he finds this helpful. He may follow-up in 6 to 8 weeks.   We will continue with therapy

## 2021-08-20 ENCOUNTER — TREATMENT (OUTPATIENT)
Dept: OCCUPATIONAL THERAPY | Age: 71
End: 2021-08-20
Payer: COMMERCIAL

## 2021-08-20 DIAGNOSIS — M18.12 ARTHRITIS OF CARPOMETACARPAL (CMC) JOINT OF LEFT THUMB: Primary | ICD-10-CM

## 2021-08-20 DIAGNOSIS — M65.4 TENOSYNOVITIS, DE QUERVAIN: ICD-10-CM

## 2021-08-20 PROCEDURE — 97530 THERAPEUTIC ACTIVITIES: CPT | Performed by: OCCUPATIONAL THERAPIST

## 2021-08-20 PROCEDURE — 97110 THERAPEUTIC EXERCISES: CPT | Performed by: OCCUPATIONAL THERAPIST

## 2021-08-20 PROCEDURE — 97140 MANUAL THERAPY 1/> REGIONS: CPT | Performed by: OCCUPATIONAL THERAPIST

## 2021-08-20 PROCEDURE — 97022 WHIRLPOOL THERAPY: CPT | Performed by: OCCUPATIONAL THERAPIST

## 2021-08-20 NOTE — PROGRESS NOTES
[facility] OCCUPATIONAL THERAPY   [address]  [facility] [address]  Dept: [address] Fax: [phone]    OCCUPATIONAL THERAPY   PROGRESS NOTE    Date:  2021                          Initial Evaluation Date: 2021    Patient Name:  Josette Guzman. :  1950    Restrictions/Precautions: Activity as tolerated, Low fall risk  Diagnosis:    M65.4 (ICD-10-CM) - Tenosynovitis, de Quervain   M18.12 (ICD-10-CM) - Arthritis of carpometacarpal (CMC) joint of left thumb   Date of Surgery/Injury: 21 L thumb CMC arthroplasty revision, L FCR tenotomy/ debridement, L wrist arthropathy     Insurance/Certification information:  [facility] [id number]  Plan of care signed (Y/N): Y ( thru 21)  Visit# / total visits:  (New C9  to 21) Completed 17 prior visits from  to      Referring Practitioner:  Dr Gregory James  Specific Practitioner Orders: [facility] advance in therapy. Wean out of his splint. Improve range of motion. May incorporate light strengthening at 8 weeks postop. Modalities as needed    Assessment of current deficits   []? Functional mobility             [x]? ADLs          [x]? Strength                  []? Cognition   []? Functional transfers           [x]? IADLs         []? Safety Awareness  []? Endurance   [x]? Fine Motor Coordination    []? Balance      []? Vision/perception   [x]? Sensation     []? Gross Motor Coordination [x]? ROM           [x]? Pain                        [x]? Edema          [x]? Scar Adhesion/Skin Integrity      OT PLAN OF CARE   OT POC based on physician orders, patient diagnosis and results of clinical assessment     Frequency/Duration: 3x/ week x 6 weeks  Specific OT Treatment to include:      [x]? Instruction in HEP                   Modalities:  [x]?  Therapeutic Exercise                 [x]? Ultrasound               []? Electrical Stimulation/Attended  [x]? PROM/Stretching                    [x]? Fluidotherapy          [x]?   Paraffin                   [x]? AAROM  [x]?  AROM                 []? Iontophoresis:   [x]? Eleanor South                                       []? Neuromuscular Re-Ed            []? ADL/IADL re-training    [x]?  Therapeutic Activity                  [x]? Pain Management with/without modalities PRN                 [x]?  Manual Therapy                      [x]? Splinting                                   [x]? Scar Management                   []?Joint Protection/Training  []? Ergonomics                             []? Joint Mobilization                      []? Adaptive Equipment Assessment/Training                             [x]?  Manual Edema Mobilization   []? Myofascial Release                 []? Energy Conservation/Work Simplification  [x]? FM Coordination           []? Safety retraining/education per  individual diagnosis/goals  [x]? Desensitization                                   GOALS (Long term same as Short term):  1) Patient will demonstrate good understanding of home program (exercises/activities/diagnosis/prognosis/goals) with good accuracy. 2) Patient will demonstrate increased active/passive range of motion of their left thumb / Dois Lanius WFLs for ADL/IADL completion. 3) Patient will demonstrate /pinch strength of at least 40 / 5 pinch pounds of their left hand. 4) Patient to report decreased pain in their affected left distal  upper extremity from 9/10 to 3/10 or less with resistive functional use. 5) Increase in fine motor function as evidenced by time to complete 9-hole peg test by < 30  Seconds and improved ability to perform fasteners. 6) Patient to report a decrease in hypersensitivity from 100% to 20% or less in their left thumb/ wrist.   7) Patient to demonstrate decreased guarding of their affected extremity from 100% to 20% or less. 8) Patient will demonstrate a non-tender/non-adherent scar.    9) Patient will report ADL/ IADL  functions as Mod I/I using the affected left hand/ wrist.   10) Patient will decrease QuickDASH score to 30% or less for increased participation in daily functional activities. Pain Level:    7/10 in the L wrist/ thumb  sensitive, sore,  tight/ pulling pains  Subjective: \" The doctor saw me and feels it is just going to take time. \"  Objective:  Updated POC to be completed by 9/12/2021. INTERVENTION: COMPLETED: SPECIFICS/COMMENTS:   Modality:     US   To MP area dorsally 4 min and thumb thenar area 4 min. 3.3 MhZ, 20%, intensity . 08 to minimize scar adhesions and decrease post activity swelling   Fluidotherapy- L hand/ wrist X- 10 min Completed with gentle AROM thumb/ wrist as tolerated to increase pain free ROM and promote soft tissue elasticity        AROM:          Digital AROM        - tendon glides/MP flex/ext,  Hook fist and then full fist- 10x    -towel ex w/ digits and thumb  -beans grasp and release - 5 min    L thumb AROM X   X           - IP and MP flex/ ext 15x  - AROM all planes 10x each   -small Purdue pegboard pieces (10 pieces set up)  -sponge rotation in hand forward and backward  - exercise balls        L wrist AROM  x    x     --jux er ciser-  ^ to 5  reps using less compensation for the wrist  - wrist AROM all planes  - rolling weighted on table to stretch wrist extension and flex- hold 5 sec end range/ supination-pronation        L forearm rotation x Supination/ pronation   AAROM:     Wrist flex/ext/ deviations  x Completed as tolerated   L thumb AAROM x All planes        PROM/Stretching:     L thumb  X- 15 reps     IP/ MP flexion/ gentle composite flexion as tolerated      L gentle wrist all planes x         Scar Mass/Edema Control:     Scar and retrograde X  x - manual massage  -manual desensitization   - silicone gel provided to help scar   Mesh compression sleeve   Continue on thumb to decrease pain and edema as needed.      Strengthening:     - gentle hand strengthening  X    x    X  X      x - soft putty/ gentle grasp and manipulate ^30x  - light palmar WB onto soft putty/ completed in sitting  - soft putty taffy pull 10x up/ 10x down  -soft putty/ push in small pegs with tip pinch   -picking up 1# weighted ball 10 reps  - velcro board with key pinch/ only 1 x today due to thumb pain   Wrist/forearm strengthening  - 1# wt wrist flex. Ext, RD - UD and forearm rotation 5 rep's with sm arc ROM devang Fair +   Other:     Desensitization  X  Continue at home  massage with graded textures from smooth to slightly rough/ pt to continue at home for 10 min self massage - 2x/ day   Splint x Pt fit today with a large Comfort cool brace. Compliance is good. HEP   Reviewed all and given handout - see attached. Splint on and digital exer. And thumb IP flex/ext-see above.     x AROM- thumb extension, thumb ABD, IP blocking ex, supination/ pronation, wrist flexion/ extension/ encourage more self massage for scar mgmt and desensitization          Assessment/Comments: Pt is making Good progress toward stated plan of care. Pt tolerated session well with increased endurance, ROM and tolerance with strengthening this date well. Pt sees referring physician tomorrow-will continue to progress as tolerated. -Rehab Potential: Good  -Requires OT Follow Up: Yes  Time In:  1005       Time Out: 1100           Visit #: 7  ( 17 used already)      Treatment Charges: Mins Time in - Time out  Units   Modalities: Fluido 10 6936-9243 1   Ther Exercise 15 3599-2318 1   Manual Therapy 10 5217-1557 1   Thera Activities 20 2623-3227 1   ADL/Home Mgt       Neuro Re-education      Gait Training      Group Therapy      Non-Billable Service Time      Other: ortho fit      Total Time/Units 60  4     -Response to Treatment: Pt is scheduled for RTW next week on light duty. Will continue current POC as tolerated. Goals: Goals for pt can be seen on initial eval occurring on 5/26/21, update 6-23-21 and 7-12-21.     Plan:   [x]  Continue Plan of care: Continue OT pain mgmt, ROM, FM tasks and wrist/ hand strengthening. Treatment covered based on POC and graduated to patient's progress. Pt education continues at each visit to obtain maximum benefits from skilled OT intervention.   []  [facility] of care:   []  Discharge:      Connor Rowell, OT/L  [id number]

## 2021-08-25 ENCOUNTER — TREATMENT (OUTPATIENT)
Dept: OCCUPATIONAL THERAPY | Age: 71
End: 2021-08-25
Payer: COMMERCIAL

## 2021-08-25 DIAGNOSIS — M18.12 ARTHRITIS OF CARPOMETACARPAL (CMC) JOINT OF LEFT THUMB: Primary | ICD-10-CM

## 2021-08-25 DIAGNOSIS — M65.4 TENOSYNOVITIS, DE QUERVAIN: ICD-10-CM

## 2021-08-25 PROCEDURE — 97140 MANUAL THERAPY 1/> REGIONS: CPT | Performed by: OCCUPATIONAL THERAPIST

## 2021-08-25 PROCEDURE — 97530 THERAPEUTIC ACTIVITIES: CPT | Performed by: OCCUPATIONAL THERAPIST

## 2021-08-25 PROCEDURE — 97022 WHIRLPOOL THERAPY: CPT | Performed by: OCCUPATIONAL THERAPIST

## 2021-08-25 PROCEDURE — 97110 THERAPEUTIC EXERCISES: CPT | Performed by: OCCUPATIONAL THERAPIST

## 2021-08-25 NOTE — PROGRESS NOTES
[facility] OCCUPATIONAL THERAPY   [address]  [facility] [address]  Dept: [address] OT Fax: [phone]    OCCUPATIONAL THERAPY   PROGRESS NOTE    Date:  2021                          Initial Evaluation Date: 2021    Patient Name:  Bar Grimes. :  1950    Restrictions/Precautions: Activity as tolerated, Low fall risk  Diagnosis:    M65.4 (ICD-10-CM) - Tenosynovitis, de Quervain   M18.12 (ICD-10-CM) - Arthritis of carpometacarpal (CMC) joint of left thumb   Date of Surgery/Injury: 21 L thumb CMC arthroplasty revision, L FCR tenotomy/ debridement, L wrist arthropathy     Insurance/Certification information:  [facility] [id number]  Plan of care signed (Y/N): Y ( thru 21)  Visit# / total visits:  (New C9  to 21) Completed 17 prior visits from  to      Referring Practitioner:  Dr Cheryle Shed  Specific Practitioner Orders: [facility] advance in therapy. Wean out of his splint. Improve range of motion. May incorporate light strengthening at 8 weeks postop. Modalities as needed    Assessment of current deficits   []? Functional mobility             [x]? ADLs          [x]? Strength                  []? Cognition   []? Functional transfers           [x]? IADLs         []? Safety Awareness  []? Endurance   [x]? Fine Motor Coordination    []? Balance      []? Vision/perception   [x]? Sensation     []? Gross Motor Coordination [x]? ROM           [x]? Pain                        [x]? Edema          [x]? Scar Adhesion/Skin Integrity      OT PLAN OF CARE   OT POC based on physician orders, patient diagnosis and results of clinical assessment     Frequency/Duration: 3x/ week x 6 weeks  Specific OT Treatment to include:      [x]? Instruction in HEP                   Modalities:  [x]?  Therapeutic Exercise                 [x]? Ultrasound               []? Electrical Stimulation/Attended  [x]? PROM/Stretching                    [x]? Fluidotherapy          [x]?   Paraffin                   [x]? AAROM  [x]?  AROM                 []? Iontophoresis:   [x]? Radhames Hallman                                       []? Neuromuscular Re-Ed            []? ADL/IADL re-training    [x]?  Therapeutic Activity                  [x]? Pain Management with/without modalities PRN                 [x]?  Manual Therapy                      [x]? Splinting                                   [x]? Scar Management                   []?Joint Protection/Training  []? Ergonomics                             []? Joint Mobilization                      []? Adaptive Equipment Assessment/Training                             [x]?  Manual Edema Mobilization   []? Myofascial Release                 []? Energy Conservation/Work Simplification  [x]? FM Coordination           []? Safety retraining/education per  individual diagnosis/goals  [x]? Desensitization                                   GOALS (Long term same as Short term):  1) Patient will demonstrate good understanding of home program (exercises/activities/diagnosis/prognosis/goals) with good accuracy. 2) Patient will demonstrate increased active/passive range of motion of their left thumb / Contreras Mini WFLs for ADL/IADL completion. 3) Patient will demonstrate /pinch strength of at least 40 / 5 pinch pounds of their left hand. 4) Patient to report decreased pain in their affected left distal  upper extremity from 9/10 to 3/10 or less with resistive functional use. 5) Increase in fine motor function as evidenced by time to complete 9-hole peg test by < 30  Seconds and improved ability to perform fasteners. 6) Patient to report a decrease in hypersensitivity from 100% to 20% or less in their left thumb/ wrist.   7) Patient to demonstrate decreased guarding of their affected extremity from 100% to 20% or less. 8) Patient will demonstrate a non-tender/non-adherent scar.    9) Patient will report ADL/ IADL  functions as Mod I/I using only 1 x today due to thumb pain   Wrist/forearm strengthening       X    x - 1# wt wrist flex. Ext, RD - UD and forearm rotation 5 rep's with sm arc ROM devang Fair +  -6# yellow therabar/ twist in 10x- twist out 10x- bending 10x  - weighted dowel- 1# forearm rotation 10x / deviations 5x   Other:     Desensitization  X  Continue at home  massage with graded textures from smooth to slightly rough/ pt to continue at home for 10 min self massage - 2x/ day   Splint x Pt fit today with a large Comfort cool brace. Compliance is good. HEP   Reviewed all and given handout - see attached. Splint on and digital exer. And thumb IP flex/ext-see above.     x AROM- thumb extension, thumb ABD, IP blocking ex, supination/ pronation, wrist flexion/ extension/ encourage more self massage for scar mgmt and desensitization      Assessment/Comments: Pt is making Good progress toward stated plan of care. ROM and strengthening continued as tolerated. Movement increased as session progressed. Will continue to advance as tolerated. -Rehab Potential: Good  -Requires OT Follow Up: Yes  Time In:  0800       Time Out: 0855          Visit #: 8  ( 17 used already)      Treatment Charges: Mins Time in - Time out  Units   Modalities: Fluido 10 7137-1601 1   Ther Exercise 15 9150-0734 1   Manual Therapy 10 9052-3881 1   Thera Activities 20 5003-4352 1   ADL/Home Mgt       Neuro Re-education      Gait Training      Group Therapy      Non-Billable Service Time      Other: ortho fit      Total Time/Units 55  4     -Response to Treatment: Pt is back to work and pain levels continue to slowly decrease. Goals: Goals for pt can be seen on initial eval occurring on 5/26/21, update 6-23-21 and 7-12-21. Plan:   [x]  Continue Plan of care: Continue OT pain mgmt, ROM, FM tasks and wrist/ hand strengthening. Treatment covered based on POC and graduated to patient's progress.  Pt education continues at each visit to obtain maximum benefits from skilled OT intervention.   []  [facility] of care:   []  Discharge:      Cari Hooker, OT/L  [id number]

## 2021-08-27 ENCOUNTER — TREATMENT (OUTPATIENT)
Dept: OCCUPATIONAL THERAPY | Age: 71
End: 2021-08-27
Payer: COMMERCIAL

## 2021-08-27 DIAGNOSIS — M18.12 ARTHRITIS OF CARPOMETACARPAL (CMC) JOINT OF LEFT THUMB: Primary | ICD-10-CM

## 2021-08-27 DIAGNOSIS — M65.4 TENOSYNOVITIS, DE QUERVAIN: ICD-10-CM

## 2021-08-27 PROCEDURE — 97022 WHIRLPOOL THERAPY: CPT | Performed by: OCCUPATIONAL THERAPIST

## 2021-08-27 PROCEDURE — 97110 THERAPEUTIC EXERCISES: CPT | Performed by: OCCUPATIONAL THERAPIST

## 2021-08-27 PROCEDURE — 97530 THERAPEUTIC ACTIVITIES: CPT | Performed by: OCCUPATIONAL THERAPIST

## 2021-08-27 PROCEDURE — 97140 MANUAL THERAPY 1/> REGIONS: CPT | Performed by: OCCUPATIONAL THERAPIST

## 2021-08-27 NOTE — PROGRESS NOTES
[facility] OCCUPATIONAL THERAPY   [address]  [facility] [address]  Dept: [address] OT Fax: [phone]    OCCUPATIONAL THERAPY   PROGRESS NOTE    Date:  2021                          Initial Evaluation Date: 2021    Patient Name:  Kory Dominguez. :  1950    Restrictions/Precautions: Activity as tolerated, Low fall risk  Diagnosis:    M65.4 (ICD-10-CM) - Tenosynovitis, de Quervain   M18.12 (ICD-10-CM) - Arthritis of carpometacarpal (CMC) joint of left thumb   Date of Surgery/Injury: 21 L thumb CMC arthroplasty revision, L FCR tenotomy/ debridement, L wrist arthropathy     Insurance/Certification information:  [facility] [id number]  Plan of care signed (Y/N): Y ( thru 21)  Visit# / total visits:  (New C9  to 21) Completed 17 prior visits from  to      Referring Practitioner:  Dr Bobo Laboy  Specific Practitioner Orders: [facility] advance in therapy. Wean out of his splint. Improve range of motion. May incorporate light strengthening at 8 weeks postop. Modalities as needed    Assessment of current deficits   []? Functional mobility             [x]? ADLs          [x]? Strength                  []? Cognition   []? Functional transfers           [x]? IADLs         []? Safety Awareness  []? Endurance   [x]? Fine Motor Coordination    []? Balance      []? Vision/perception   [x]? Sensation     []? Gross Motor Coordination [x]? ROM           [x]? Pain                        [x]? Edema          [x]? Scar Adhesion/Skin Integrity      OT PLAN OF CARE   OT POC based on physician orders, patient diagnosis and results of clinical assessment     Frequency/Duration: 3x/ week x 6 weeks  Specific OT Treatment to include:      [x]? Instruction in HEP                   Modalities:  [x]?  Therapeutic Exercise                 [x]? Ultrasound               []? Electrical Stimulation/Attended  [x]? PROM/Stretching                    [x]? Fluidotherapy          [x]?   Paraffin                   [x]? AAROM  [x]?  AROM                 []? Iontophoresis:   [x]? Curt Sawyer                                       []? Neuromuscular Re-Ed            []? ADL/IADL re-training    [x]?  Therapeutic Activity                  [x]? Pain Management with/without modalities PRN                 [x]?  Manual Therapy                      [x]? Splinting                                   [x]? Scar Management                   []?Joint Protection/Training  []? Ergonomics                             []? Joint Mobilization                      []? Adaptive Equipment Assessment/Training                             [x]?  Manual Edema Mobilization   []? Myofascial Release                 []? Energy Conservation/Work Simplification  [x]? FM Coordination           []? Safety retraining/education per  individual diagnosis/goals  [x]? Desensitization                                   GOALS (Long term same as Short term):  1) Patient will demonstrate good understanding of home program (exercises/activities/diagnosis/prognosis/goals) with good accuracy. 2) Patient will demonstrate increased active/passive range of motion of their left thumb / Suni Sos WFLs for ADL/IADL completion. 3) Patient will demonstrate /pinch strength of at least 40 / 5 pinch pounds of their left hand. 4) Patient to report decreased pain in their affected left distal  upper extremity from 9/10 to 3/10 or less with resistive functional use. 5) Increase in fine motor function as evidenced by time to complete 9-hole peg test by < 30  Seconds and improved ability to perform fasteners. 6) Patient to report a decrease in hypersensitivity from 100% to 20% or less in their left thumb/ wrist.   7) Patient to demonstrate decreased guarding of their affected extremity from 100% to 20% or less. 8) Patient will demonstrate a non-tender/non-adherent scar.    9) Patient will report ADL/ IADL  functions as Mod I/I using the affected left hand/ wrist.   10) Patient will decrease QuickDASH score to 30% or less for increased participation in daily functional activities. Pain Level:   8/10 in the L wrist/ thumb  sensitive, sore,  tight/ pulling pains  Subjective: \" My thumb really hurts today. I'm not sure if I bumped it or something in my sleep\". Objective:  Updated POC to be completed by 9/12/2021. INTERVENTION: COMPLETED: SPECIFICS/COMMENTS:   Modality:     US   To MP area dorsally 4 min and thumb thenar area 4 min. 3.3 MhZ, 20%, intensity . 08 to minimize scar adhesions and decrease post activity swelling   Fluidotherapy- L hand/ wrist X- 10 min Completed with gentle AROM thumb/ wrist as tolerated to increase pain free ROM and promote soft tissue elasticity        AROM:          Digital AROM        - tendon glides/MP flex/ext,  Hook fist and then full fist- 10x    -towel ex w/ digits and thumb  -beans grasp and release - 5 min    L thumb AROM X   X    x   - IP and MP flex/ ext 15x  - AROM all planes 10x each   -- exercise balls   - in hand manipulation 5 multi sized  beads       L wrist AROM  x    x   --jux er ciser-   5  reps using moderate compensation for the wrist  - wrist AROM all planes          L forearm rotation x Supination/ pronation   AAROM:     Wrist flex/ext/ deviations  x Completed as tolerated   L thumb AAROM x All planes        PROM/Stretching:     L thumb  X- 15 reps    X IP/ MP flexion/ gentle composite flexion as tolerated   - thumb AROM all planes     L gentle wrist all planes x         Scar Mass/Edema Control:     Scar and retrograde X  x - manual massage  -manual desensitization   - silicone gel provided to help scar   Strengthening:     - gentle hand strengthening  x        x         - soft putty/ gentle grasp and manipulate ^30x  - light palmar WB onto soft putty/ completed in sitting  - soft putty taffy pull 10x up/ 10x down  -soft putty/ push in small pegs with tip pinch   -picking up 1# weighted ball 10 reps  - velcro board with key pinch/ only 1 x today due to thumb pain   Wrist/forearm strengthening X       X    x - 1# wt / ^ to 2# wrist flex/ ext 10x   -6# yellow therabar/ twist in 10x- twist out 10x- bending 10x  - weighted dowel- 1# forearm rotation 10x / deviations 5x   Other:     Desensitization  X  Continue at home  massage with graded textures from smooth to slightly rough/ pt to continue at home for 10 min self massage - 2x/ day   Splint x Pt fit today with a large Comfort cool brace. Compliance is good. HEP   Reviewed all and given handout - see attached. Splint on and digital exer. And thumb IP flex/ext-see above.     x AROM- thumb extension, thumb ABD, IP blocking ex, supination/ pronation, wrist flexion/ extension/ encourage more self massage for scar mgmt and desensitization      Assessment/Comments: Pt is making Good progress toward stated plan of care. ROM and strengthening continued as tolerated. All exercise involving the thumb is more challenging today due to pain. Pt may benefit from a compound cream to help ease wrist/ thumb discomfort. Will call MD office and see if he is a candidate. Improvements are slow and pt is trying to use his hand more at home. Will continue to advance as tolerated. -Rehab Potential: Good  -Requires OT Follow Up: Yes  Time In:  0800       Time Out: 0855          Visit #: 9  ( 17 used already)      Treatment Charges: Mins Time in - Time out  Units   Modalities: Fluido 10 3250-2290 1   Ther Exercise 15 8785-5337 1   Manual Therapy 10 9599-5032 1   Thera Activities 20 6463-7460 1   ADL/Home Mgt       Neuro Re-education      Gait Training      Group Therapy      Non-Billable Service Time      Other: ortho fit      Total Time/Units 55  4     -Response to Treatment: Pt is back to work and pain levels continue to slowly decrease. Goals: Goals for pt can be seen on initial eval occurring on 5/26/21, update 6-23-21 and 7-12-21.     Plan:   [x]  Continue Plan of care: Continue OT pain mgmt, ROM, FM tasks and wrist/ hand strengthening. Treatment covered based on POC and graduated to patient's progress. Pt education continues at each visit to obtain maximum benefits from skilled OT intervention.   []  [facility] of care:   []  Discharge:      Finesse Queen OT/L  [id number]

## 2021-08-31 DIAGNOSIS — M18.12 ARTHRITIS OF CARPOMETACARPAL (CMC) JOINT OF LEFT THUMB: ICD-10-CM

## 2021-08-31 RX ORDER — AMITRIPTYLINE HYDROCHLORIDE 10 MG/1
TABLET, FILM COATED ORAL
Qty: 30 TABLET | Refills: 0 | Status: SHIPPED
Start: 2021-08-31 | End: 2021-09-28

## 2021-09-01 ENCOUNTER — TREATMENT (OUTPATIENT)
Dept: OCCUPATIONAL THERAPY | Age: 71
End: 2021-09-01
Payer: COMMERCIAL

## 2021-09-01 DIAGNOSIS — M65.4 TENOSYNOVITIS, DE QUERVAIN: ICD-10-CM

## 2021-09-01 DIAGNOSIS — M18.12 ARTHRITIS OF CARPOMETACARPAL (CMC) JOINT OF LEFT THUMB: Primary | ICD-10-CM

## 2021-09-01 PROCEDURE — 97022 WHIRLPOOL THERAPY: CPT | Performed by: OCCUPATIONAL THERAPIST

## 2021-09-01 PROCEDURE — 97110 THERAPEUTIC EXERCISES: CPT | Performed by: OCCUPATIONAL THERAPIST

## 2021-09-01 PROCEDURE — 97530 THERAPEUTIC ACTIVITIES: CPT | Performed by: OCCUPATIONAL THERAPIST

## 2021-09-01 PROCEDURE — 97140 MANUAL THERAPY 1/> REGIONS: CPT | Performed by: OCCUPATIONAL THERAPIST

## 2021-09-01 NOTE — PROGRESS NOTES
[x]?   Paraffin                   [x]? AAROM  [x]?  AROM                 []? Iontophoresis:   [x]? Olegario Flavin                                       []? Neuromuscular Re-Ed            []? ADL/IADL re-training    [x]?  Therapeutic Activity                  [x]? Pain Management with/without modalities PRN                 [x]?  Manual Therapy                      [x]? Splinting                                   [x]? Scar Management                   []?Joint Protection/Training  []? Ergonomics                             []? Joint Mobilization                      []? Adaptive Equipment Assessment/Training                             [x]?  Manual Edema Mobilization   []? Myofascial Release                 []? Energy Conservation/Work Simplification  [x]? FM Coordination           []? Safety retraining/education per  individual diagnosis/goals  [x]? Desensitization                                   GOALS (Long term same as Short term):  1) Patient will demonstrate good understanding of home program (exercises/activities/diagnosis/prognosis/goals) with good accuracy. 2) Patient will demonstrate increased active/passive range of motion of their left thumb / Jose Elias Hurter WFLs for ADL/IADL completion. 3) Patient will demonstrate /pinch strength of at least 40 / 5 pinch pounds of their left hand. 4) Patient to report decreased pain in their affected left distal  upper extremity from 9/10 to 3/10 or less with resistive functional use. 5) Increase in fine motor function as evidenced by time to complete 9-hole peg test by < 30  Seconds and improved ability to perform fasteners. 6) Patient to report a decrease in hypersensitivity from 100% to 20% or less in their left thumb/ wrist.   7) Patient to demonstrate decreased guarding of their affected extremity from 100% to 20% or less. 8) Patient will demonstrate a non-tender/non-adherent scar.    9) Patient will report ADL/ IADL  functions as Mod I/I using pegs with tip pinch   -picking up 1# weighted ball 10 reps  - velcro board with key pinch/ only 1 x today due to thumb pain   Wrist/forearm strengthening            - 1# wt / ^ to 2# wrist flex/ ext 10x   -6# yellow therabar/ twist in 10x- twist out 10x- bending 10x  - weighted dowel- 1# forearm rotation 10x / deviations 5x   Other:     Desensitization  X  Continue at home  massage with graded textures from smooth to slightly rough/ pt to continue at home for 10 min self massage - 2x/ day   Splint x Pt fit today with a large Comfort cool brace. Compliance is good. HEP   Reviewed all and given handout - see attached. Splint on and digital exer. And thumb IP flex/ext-see above.     x AROM- thumb extension, thumb ABD, IP blocking ex, supination/ pronation, wrist flexion/ extension/ encourage more self massage for scar mgmt and desensitization      Assessment/Comments: Pt is making Good progress toward stated plan of care. Tolerance for movement in the wrist and thumb is significantly decreased today. Intensity of exercise was decreased to a tolerable level. Will monitor and adjust as needed. -Rehab Potential: Good  -Requires OT Follow Up: Yes  Time In:  0800       Time Out: 0855          Visit #: 10 ( 17 used already)      Treatment Charges: Mins Time in - Time out  Units   Modalities: Fluido 10 5197-6455 1   Ther Exercise 15 0500-8003 1   Manual Therapy 10 5753-6676 1   Thera Activities 05 9821-8586 1   ADL/Home Mgt       Neuro Re-education      Gait Training      Group Therapy      Non-Billable Service Time      Other: ortho fit      Total Time/Units 55  4     -Response to Treatment: Pt is in more pain today and is getting frustrated. Support provided. Goals: Goals for pt can be seen on initial eval occurring on 5/26/21, update 6-23-21 and 7-12-21. Plan:   [x]  Continue Plan of care: Continue OT pain mgmt, ROM, FM tasks and wrist/ hand strengthening.  Treatment covered based on POC and graduated to

## 2021-09-03 ENCOUNTER — TREATMENT (OUTPATIENT)
Dept: OCCUPATIONAL THERAPY | Age: 71
End: 2021-09-03
Payer: COMMERCIAL

## 2021-09-03 DIAGNOSIS — M18.12 ARTHRITIS OF CARPOMETACARPAL (CMC) JOINT OF LEFT THUMB: Primary | ICD-10-CM

## 2021-09-03 DIAGNOSIS — M65.4 TENOSYNOVITIS, DE QUERVAIN: ICD-10-CM

## 2021-09-03 PROCEDURE — 97140 MANUAL THERAPY 1/> REGIONS: CPT | Performed by: OCCUPATIONAL THERAPIST

## 2021-09-03 PROCEDURE — 97022 WHIRLPOOL THERAPY: CPT | Performed by: OCCUPATIONAL THERAPIST

## 2021-09-03 PROCEDURE — 97110 THERAPEUTIC EXERCISES: CPT | Performed by: OCCUPATIONAL THERAPIST

## 2021-09-03 NOTE — PROGRESS NOTES
[facility] OCCUPATIONAL THERAPY   [address]  [facility] [address]  Dept: [address] OT Fax: [phone]    OCCUPATIONAL THERAPY   PROGRESS NOTE    Date:  9/3/2021                          Initial Evaluation Date: 2021    Patient Name:  Kapil Tripp. :  1950    Restrictions/Precautions: Activity as tolerated, Low fall risk  Diagnosis:    M65.4 (ICD-10-CM) - Tenosynovitis, de Quervain   M18.12 (ICD-10-CM) - Arthritis of carpometacarpal (CMC) joint of left thumb   Date of Surgery/Injury: 21 L thumb CMC arthroplasty revision, L FCR tenotomy/ debridement, L wrist arthropathy     Insurance/Certification information:  [facility] [id number]  Plan of care signed (Y/N): Y ( thru 21)  Visit# / total visits:  (New C9  to 21- date ext requested on ) Completed 17 prior visits from  to      Referring Practitioner:  Dr Edwina Moritz  Specific Practitioner Orders: [facility] advance in therapy. Wean out of his splint. Improve range of motion. May incorporate light strengthening at 8 weeks postop. Modalities as needed    Assessment of current deficits   []? Functional mobility             [x]? ADLs          [x]? Strength                  []? Cognition   []? Functional transfers           [x]? IADLs         []? Safety Awareness  []? Endurance   [x]? Fine Motor Coordination    []? Balance      []? Vision/perception   [x]? Sensation     []? Gross Motor Coordination [x]? ROM           [x]? Pain                        [x]? Edema          [x]? Scar Adhesion/Skin Integrity      OT PLAN OF CARE   OT POC based on physician orders, patient diagnosis and results of clinical assessment     Frequency/Duration: 3x/ week x 6 weeks  Specific OT Treatment to include:      [x]? Instruction in HEP                   Modalities:  [x]?  Therapeutic Exercise                 [x]? Ultrasound               []? Electrical Stimulation/Attended  [x]? PROM/Stretching                    [x]? Fluidotherapy          [x]?   Paraffin                   [x]? AAROM  [x]?  AROM                 []? Iontophoresis:   [x]? Olegario Flavin                                       []? Neuromuscular Re-Ed            []? ADL/IADL re-training    [x]?  Therapeutic Activity                  [x]? Pain Management with/without modalities PRN                 [x]?  Manual Therapy                      [x]? Splinting                                   [x]? Scar Management                   []?Joint Protection/Training  []? Ergonomics                             []? Joint Mobilization                      []? Adaptive Equipment Assessment/Training                             [x]?  Manual Edema Mobilization   []? Myofascial Release                 []? Energy Conservation/Work Simplification  [x]? FM Coordination           []? Safety retraining/education per  individual diagnosis/goals  [x]? Desensitization                                   GOALS (Long term same as Short term):  1) Patient will demonstrate good understanding of home program (exercises/activities/diagnosis/prognosis/goals) with good accuracy. 2) Patient will demonstrate increased active/passive range of motion of their left thumb / Sublette Hurter WFLs for ADL/IADL completion. 3) Patient will demonstrate /pinch strength of at least 40 / 5 pinch pounds of their left hand. 4) Patient to report decreased pain in their affected left distal  upper extremity from 9/10 to 3/10 or less with resistive functional use. 5) Increase in fine motor function as evidenced by time to complete 9-hole peg test by < 30  Seconds and improved ability to perform fasteners. 6) Patient to report a decrease in hypersensitivity from 100% to 20% or less in their left thumb/ wrist.   7) Patient to demonstrate decreased guarding of their affected extremity from 100% to 20% or less. 8) Patient will demonstrate a non-tender/non-adherent scar.    9) Patient will report ADL/ IADL  functions as Mod I/I using the affected left hand/ wrist.   10) Patient will decrease QuickDASH score to 30% or less for increased participation in daily functional activities. Pain Level:   8/10 in the L wrist/ thumb  sensitive, sore,  tight/ pulling pains  Subjective: \" My thumb still hurts. I am so sick of it ! \"   Objective:  Updated POC to be completed by 9/12/2021. INTERVENTION: COMPLETED: SPECIFICS/COMMENTS:   Modality:     US   To MP area dorsally 4 min and thumb thenar area 4 min. 3.3 MhZ, 20%, intensity . 08 to minimize scar adhesions and decrease post activity swelling   Fluidotherapy- L hand/ wrist X- 10 min Completed with gentle AROM thumb/ wrist as tolerated to increase pain free ROM and promote soft tissue elasticity        AROM:          L thumb AROM   X       - IP and MP flex/ ext 15x  - AROM all planes 10x each   -- exercise balls  - in hand manipulation 5 multi sized  beads  - alternating opposition with small beads   L wrist AROM    X     --jux er ciser-   5  reps using   - wrist AROM all planes  - ball tabletop ROM ex for wrist        L forearm rotation x Supination/ pronation   AAROM:     Wrist flex/ext/ deviations  x Completed as tolerated   L thumb AAROM x All planes        PROM/Stretching:     L thumb  X- 15 reps    X IP/ MP flexion/ gentle composite flexion as tolerated   - thumb AROM all planes     L gentle wrist all planes x         Scar Mass/Edema Control:     Scar and retrograde X  x - manual massage  -manual desensitization   - silicone gel provided to help scar   Strengthening:     - gentle hand strengthening  x                 - soft putty/ gentle grasp and manipulate decrease from 30x to 20x due to pain  - light palmar WB onto soft putty/ completed in sitting  - soft putty taffy pull 10x up/ 10x down  -soft putty/ push in small pegs with tip pinch   -picking up 1# weighted ball 10 reps  - velcro board with key pinch/ only 1 x today due to thumb pain   Wrist/forearm strengthening x           -  2# decreased to 1# 15x wrist flex/ ext 10x   -6# yellow therabar/ twist in 10x- twist out 10x- bending 10x  - weighted dowel- 1# forearm rotation 10x / deviations 5x   Other:     Desensitization  X  Continue at home  massage with graded textures from smooth to slightly rough/ pt to continue at home for 10 min self massage - 2x/ day   Splint x Pt fit today with a large Comfort cool brace. Compliance is good. HEP   Reviewed all and given handout - see attached. Splint on and digital exer. And thumb IP flex/ext-see above.     x AROM- thumb extension, thumb ABD, IP blocking ex, supination/ pronation, wrist flexion/ extension/ encourage more self massage for scar mgmt and desensitization      Assessment/Comments: Pt is making Good progress toward stated plan of care. ROM and strengthening tolerance is decreased today. Overall pain and sensitivity has been increased possibly due to weather changes.     -Rehab Potential: Good  -Requires OT Follow Up: Yes  Time In:  0805       Time Out: 0845          Visit #: 11 ( 17 used already)      Treatment Charges: Mins Time in - Time out  Units   Modalities: Fluido 10 6771-6059 1   Ther Exercise 20 8804-1855 1   Manual Therapy 10 9912-1449 1   Thera Activities      ADL/Home Mgt       Neuro Re-education      Gait Training      Group Therapy      Non-Billable Service Time      Other: ortho fit      Total Time/Units 40  3     -Response to Treatment: Pt is still  Frustrated with his condition. Session shortened due to pt needing to leave early for another appointment. .    Goals: Goals for pt can be seen on initial eval occurring on 5/26/21, update 6-23-21 and 7-12-21. Plan:   [x]  Continue Plan of care: Continue OT pain mgmt, ROM,  and wrist/ hand strengthening. Treatment covered based on POC and graduated to patient's progress. Pt education continues at each visit to obtain maximum benefits from skilled OT intervention.   []  Alter Plan of care:   [] Discharge:      Andrae Hdez, OT/L  [id number]

## 2021-09-08 ENCOUNTER — TREATMENT (OUTPATIENT)
Dept: OCCUPATIONAL THERAPY | Age: 71
End: 2021-09-08
Payer: COMMERCIAL

## 2021-09-08 DIAGNOSIS — M65.4 TENOSYNOVITIS, DE QUERVAIN: ICD-10-CM

## 2021-09-08 DIAGNOSIS — M18.12 ARTHRITIS OF CARPOMETACARPAL (CMC) JOINT OF LEFT THUMB: Primary | ICD-10-CM

## 2021-09-08 PROCEDURE — 97022 WHIRLPOOL THERAPY: CPT | Performed by: OCCUPATIONAL THERAPIST

## 2021-09-08 PROCEDURE — 97140 MANUAL THERAPY 1/> REGIONS: CPT | Performed by: OCCUPATIONAL THERAPIST

## 2021-09-08 PROCEDURE — 97530 THERAPEUTIC ACTIVITIES: CPT | Performed by: OCCUPATIONAL THERAPIST

## 2021-09-08 PROCEDURE — 97110 THERAPEUTIC EXERCISES: CPT | Performed by: OCCUPATIONAL THERAPIST

## 2021-09-08 NOTE — PROGRESS NOTES
[facility] OCCUPATIONAL THERAPY   [address]  [facility] [address]  Dept: [phone] Fax: [phone]    OCCUPATIONAL THERAPY   PROGRESS NOTE    Date:  2021                          Initial Evaluation Date: 2021    Patient Name:  Guerda Barahona. :  1950    Restrictions/Precautions: Activity as tolerated, Low fall risk  Diagnosis:    M65.4 (ICD-10-CM) - Tenosynovitis, de Quervain   M18.12 (ICD-10-CM) - Arthritis of carpometacarpal (CMC) joint of left thumb   Date of Surgery/Injury: 21 L thumb CMC arthroplasty revision, L FCR tenotomy/ debridement, L wrist arthropathy     Insurance/Certification information:  [facility] [id number]  Plan of care signed (Y/N): Y ( thru 21)  Visit# / total visits:  (New C9  to 21- date ext requested on ) Completed 17 prior visits from  to      Referring Practitioner:  Dr Britton Alvarado  Specific Practitioner Orders: [facility] advance in therapy. Wean out of his splint. Improve range of motion. May incorporate light strengthening at 8 weeks postop. Modalities as needed    Assessment of current deficits   []? Functional mobility             [x]? ADLs          [x]? Strength                  []? Cognition   []? Functional transfers           [x]? IADLs         []? Safety Awareness  []? Endurance   [x]? Fine Motor Coordination    []? Balance      []? Vision/perception   [x]? Sensation     []? Gross Motor Coordination [x]? ROM           [x]? Pain                        [x]? Edema          [x]? Scar Adhesion/Skin Integrity      OT PLAN OF CARE   OT POC based on physician orders, patient diagnosis and results of clinical assessment     Frequency/Duration: 3x/ week x 6 weeks  Specific OT Treatment to include:      [x]? Instruction in HEP                   Modalities:  [x]?  Therapeutic Exercise                 [x]? Ultrasound               []? Electrical Stimulation/Attended  [x]? PROM/Stretching                    [x]? Fluidotherapy          [x]?   Paraffin                   [x]? AAROM  [x]?  AROM                 []? Iontophoresis:   [x]? Efrain Bah                                       []? Neuromuscular Re-Ed            []? ADL/IADL re-training    [x]?  Therapeutic Activity                  [x]? Pain Management with/without modalities PRN                 [x]?  Manual Therapy                      [x]? Splinting                                   [x]? Scar Management                   []?Joint Protection/Training  []? Ergonomics                             []? Joint Mobilization                      []? Adaptive Equipment Assessment/Training                             [x]?  Manual Edema Mobilization   []? Myofascial Release                 []? Energy Conservation/Work Simplification  [x]? FM Coordination           []? Safety retraining/education per  individual diagnosis/goals  [x]? Desensitization                                   GOALS (Long term same as Short term):  1) Patient will demonstrate good understanding of home program (exercises/activities/diagnosis/prognosis/goals) with good accuracy. 2) Patient will demonstrate increased active/passive range of motion of their left thumb / Adah Bene WFLs for ADL/IADL completion. 3) Patient will demonstrate /pinch strength of at least 40 / 5 pinch pounds of their left hand. 4) Patient to report decreased pain in their affected left distal  upper extremity from 9/10 to 3/10 or less with resistive functional use. 5) Increase in fine motor function as evidenced by time to complete 9-hole peg test by < 30  Seconds and improved ability to perform fasteners. 6) Patient to report a decrease in hypersensitivity from 100% to 20% or less in their left thumb/ wrist.   7) Patient to demonstrate decreased guarding of their affected extremity from 100% to 20% or less. 8) Patient will demonstrate a non-tender/non-adherent scar.    9) Patient will report ADL/ IADL  functions as Mod I/I using the affected left hand/ wrist.   10) Patient will decrease QuickDASH score to 30% or less for increased participation in daily functional activities. Pain Level:   7/10 in the L wrist/ thumb  sensitive, sore,  tight/ pulling pains  Subjective: Pt presents with no new complaints. Objective:  Updated POC to be completed by 9/12/2021. INTERVENTION: COMPLETED: SPECIFICS/COMMENTS:   Modality:     US   To MP area dorsally 4 min and thumb thenar area 4 min. 3.3 MhZ, 20%, intensity . 08 to minimize scar adhesions and decrease post activity swelling   Fluidotherapy- L hand/ wrist X- 10 min Completed with gentle AROM thumb/ wrist as tolerated to increase pain free ROM and promote soft tissue elasticity        AROM:          L thumb AROM X       - AROM all planes 10x each   -- exercise balls  - in hand manipulation 5 multi sized  beads  - alternating opposition with small beads   L wrist AROM    X   --jux er ciser-   5  reps using   - wrist AROM all planes  - ball tabletop ROM ex for wrist        L forearm rotation x Supination/ pronation   AAROM:     Wrist flex/ext/ deviations  x Completed as tolerated   L thumb AAROM x All planes        PROM/Stretching:     L thumb  X- 15 reps    X IP/ MP flexion/ gentle composite flexion as tolerated   - thumb AROM all planes     L gentle wrist all planes x         Scar Mass/Edema Control:     Scar and retrograde X  x - manual massage  -manual desensitization   - silicone gel provided to help scar   Strengthening:     - functional hand strengthening x - lift and carry 1#-5#  - holding cup with adding marbles  - picking up large width containers   - gentle hand strengthening  x                 - soft putty/ gentle grasp and manipulate decrease from 30x to 20x due to pain  - light palmar WB onto soft putty/ completed in sitting  - soft putty taffy pull 10x up/ 10x down  -soft putty/ push in small pegs with tip pinch   -picking up 1# weighted ball 10 reps  - velcro board with key pinch/ only 1 x today due to thumb pain   Wrist/forearm strengthening x           -  2# decreased to 1# 15x wrist flex/ ext 10x   -6# yellow therabar/ twist in 10x- twist out 10x- bending 10x  - weighted dowel- 1# forearm rotation 10x / deviations 5x   Other:     Desensitization  X  Continue at home  massage with graded textures from smooth to slightly rough/ pt to continue at home for 10 min self massage - 2x/ day   Splint x Pt fit today with a large Comfort cool brace. Compliance is good. HEP   Reviewed all and given handout - see attached. Splint on and digital exer. And thumb IP flex/ext-see above.     x AROM- thumb extension, thumb ABD, IP blocking ex, supination/ pronation, wrist flexion/ extension/ encourage more self massage for scar mgmt and desensitization      Assessment/Comments: Pt is making Good progress toward stated plan of care. When questioned, pt was not specific about use of his affected hand at home. Therefore, Tx completed with a focus on light resistive lifting/ grasping with the affected hand/ thumb. Increased nerve pains radiating into the thumb  reported 5-10 minutes into activity. Pt was awkward in using the hand and avoided use of the thumb. Progress at this point has slowed. Pt also continues to use the comfort cool brace often.     -Rehab Potential: Good  -Requires OT Follow Up: Yes  Time In:  1500       Time Out: 1550          Visit #: 12 ( 17 used already)      Treatment Charges: Mins Time in - Time out  Units   Modalities: Fluido 10 1884-8405 1   Ther Exercise 10 6862-8990 1   Manual Therapy 10 4832-3534 1   Thera Activities 20 1622-0431 1   ADL/Home Mgt       Neuro Re-education      Gait Training      Group Therapy      Non-Billable Service Time      Other: ortho fit      Total Time/Units 50  4     -Response to Treatment: Pt is still  Frustrated with his condition. Pt reported increased hand pain by Tx end and stated he was going to soak it tonight.     Goals:

## 2021-09-10 ENCOUNTER — TREATMENT (OUTPATIENT)
Dept: OCCUPATIONAL THERAPY | Age: 71
End: 2021-09-10
Payer: COMMERCIAL

## 2021-09-10 DIAGNOSIS — M65.4 TENOSYNOVITIS, DE QUERVAIN: ICD-10-CM

## 2021-09-10 DIAGNOSIS — M18.12 ARTHRITIS OF CARPOMETACARPAL (CMC) JOINT OF LEFT THUMB: Primary | ICD-10-CM

## 2021-09-10 PROCEDURE — 97530 THERAPEUTIC ACTIVITIES: CPT | Performed by: OCCUPATIONAL THERAPIST

## 2021-09-10 PROCEDURE — 97110 THERAPEUTIC EXERCISES: CPT | Performed by: OCCUPATIONAL THERAPIST

## 2021-09-10 PROCEDURE — 97022 WHIRLPOOL THERAPY: CPT | Performed by: OCCUPATIONAL THERAPIST

## 2021-09-10 NOTE — PROGRESS NOTES
[facility] OCCUPATIONAL THERAPY   [address]  [facility] [address]  Dept: [address] OT Fax: [phone]    OCCUPATIONAL THERAPY   PROGRESS NOTE    Date:  9/10/2021                          Initial Evaluation Date: 2021    Patient Name:  Bar Grimes. :  1950    Restrictions/Precautions: Activity as tolerated, Low fall risk  Diagnosis:    M65.4 (ICD-10-CM) - Tenosynovitis, de Quervain   M18.12 (ICD-10-CM) - Arthritis of carpometacarpal (CMC) joint of left thumb   Date of Surgery/Injury: 21 L thumb CMC arthroplasty revision, L FCR tenotomy/ debridement, L wrist arthropathy     Insurance/Certification information:  [facility] [id number]  Plan of care signed (Y/N): Y ( thru 21)  Visit# / total visits:  (New C9  to 21- date ext requested on ) Completed 17 prior visits from  to      Referring Practitioner:  Dr Cheryle Shed  Specific Practitioner Orders: [facility] advance in therapy. Wean out of his splint. Improve range of motion. May incorporate light strengthening at 8 weeks postop. Modalities as needed    Assessment of current deficits   []? Functional mobility             [x]? ADLs          [x]? Strength                  []? Cognition   []? Functional transfers           [x]? IADLs         []? Safety Awareness  []? Endurance   [x]? Fine Motor Coordination    []? Balance      []? Vision/perception   [x]? Sensation     []? Gross Motor Coordination [x]? ROM           [x]? Pain                        [x]? Edema          [x]? Scar Adhesion/Skin Integrity      OT PLAN OF CARE   OT POC based on physician orders, patient diagnosis and results of clinical assessment     Frequency/Duration: 3x/ week x 6 weeks  Specific OT Treatment to include:      [x]? Instruction in HEP                   Modalities:  [x]?  Therapeutic Exercise                 [x]? Ultrasound               []? Electrical Stimulation/Attended  [x]? PROM/Stretching                    10x up/ 10x down  -soft putty/ push in small pegs with tip pinch   -picking up 1# weighted ball 10 reps  - velcro board with key pinch/ only 1 x today due to thumb pain   Wrist/forearm strengthening x           -  2# decreased to 1# 15x wrist flex/ ext 10x   -6# yellow therabar/ twist in 10x- twist out 10x- bending 10x  - weighted dowel- 1# forearm rotation 10x / deviations 5x   Other:     Desensitization  X  Continue at home  massage with graded textures from smooth to slightly rough/ pt to continue at home for 10 min self massage - 2x/ day   Splint x Pt fit today with a large Comfort cool brace. Compliance is good. HEP   Reviewed all and given handout - see attached. Splint on and digital exer. And thumb IP flex/ext-see above.     x AROM- thumb extension, thumb ABD, IP blocking ex, supination/ pronation, wrist flexion/ extension/ encourage more self massage for scar mgmt and desensitization      Assessment/Comments: Pt is making Good progress toward stated plan of care. ROM, functional arm use continued with fair tolerance. Pain continues to be an issue   -Rehab Potential: Good  -Requires OT Follow Up: Yes  Time In:  0900       Time Out: 1000       Visit #: 13 ( 17 used already)      Treatment Charges: Mins Time in - Time out  Units   Modalities: Fluido 10 2476-1987 1   Ther Exercise 30 0184-3293 2   Manual Therapy      Thera Activities 20 [id number] 1   ADL/Home Mgt       Neuro Re-education      Gait Training      Group Therapy      Non-Billable Service Time      Other: ortho fit      Total Time/Units 50  4     -Response to Treatment: Pt is still  Frustrated with his condition. He questions why his recovery is so long. Goals: Goals for pt can be seen on initial eval occurring on 5/26/21, update 6-23-21 and 7-12-21. Plan:   [x]  Continue Plan of care: Continue OT pain mgmt, ROM,  Increasing functional hand use and wrist/ hand strengthening.  Treatment covered based on POC and graduated to patient's progress. Pt education continues at each visit to obtain maximum benefits from skilled OT intervention.   []  [address] of care:   []  Discharge:      Linda Estrada, OT/L  [id number]

## 2021-09-14 ENCOUNTER — TREATMENT (OUTPATIENT)
Dept: OCCUPATIONAL THERAPY | Age: 71
End: 2021-09-14
Payer: COMMERCIAL

## 2021-09-14 DIAGNOSIS — M18.12 ARTHRITIS OF CARPOMETACARPAL (CMC) JOINT OF LEFT THUMB: Primary | ICD-10-CM

## 2021-09-14 DIAGNOSIS — M65.4 TENOSYNOVITIS, DE QUERVAIN: ICD-10-CM

## 2021-09-14 PROCEDURE — 97022 WHIRLPOOL THERAPY: CPT | Performed by: OCCUPATIONAL THERAPIST

## 2021-09-14 PROCEDURE — 97140 MANUAL THERAPY 1/> REGIONS: CPT | Performed by: OCCUPATIONAL THERAPIST

## 2021-09-14 PROCEDURE — 97110 THERAPEUTIC EXERCISES: CPT | Performed by: OCCUPATIONAL THERAPIST

## 2021-09-14 PROCEDURE — 97530 THERAPEUTIC ACTIVITIES: CPT | Performed by: OCCUPATIONAL THERAPIST

## 2021-09-14 NOTE — PROGRESS NOTES
[facility] OCCUPATIONAL THERAPY   [address]  [facility] [address]  Dept: [address] OT Fax: [phone]    OCCUPATIONAL THERAPY   PROGRESS NOTE    Date:  2021                          Initial Evaluation Date: 2021    Patient Name:  Atul Angulo. :  1950    Restrictions/Precautions: Activity as tolerated, Low fall risk  Diagnosis:    M65.4 (ICD-10-CM) - Tenosynovitis, de Quervain   M18.12 (ICD-10-CM) - Arthritis of carpometacarpal (CMC) joint of left thumb   Date of Surgery/Injury: 21 L thumb CMC arthroplasty revision, L FCR tenotomy/ debridement, L wrist arthropathy     Insurance/Certification information:  [address] [place], [place] [phone]  Plan of care signed (Y/N): Y ( thru 21)  Visit# / total visits:  (New C9  to 21- date ext requested on ) Completed 17 prior visits from  to      Referring Practitioner:  Dr Linwood Levine  Specific Practitioner Orders: [facility] advance in therapy. Wean out of his splint. Improve range of motion. May incorporate light strengthening at 8 weeks postop. Modalities as needed    Assessment of current deficits   []? Functional mobility             [x]? ADLs          [x]? Strength                  []? Cognition   []? Functional transfers           [x]? IADLs         []? Safety Awareness  []? Endurance   [x]? Fine Motor Coordination    []? Balance      []? Vision/perception   [x]? Sensation     []? Gross Motor Coordination [x]? ROM           [x]? Pain                        [x]? Edema          [x]? Scar Adhesion/Skin Integrity      OT PLAN OF CARE   OT POC based on physician orders, patient diagnosis and results of clinical assessment     Frequency/Duration: 3x/ week x 6 weeks  Specific OT Treatment to include:      [x]? Instruction in HEP                   Modalities:  [x]?  Therapeutic Exercise                 [x]? Ultrasound               []? Electrical Stimulation/Attended  [x]? PROM/Stretching                    [x]? Fluidotherapy          [x]?   Paraffin                   [x]? AAROM  [x]?  AROM                 []? Iontophoresis:   [x]? Camron Solorio                                       []? Neuromuscular Re-Ed            []? ADL/IADL re-training    [x]?  Therapeutic Activity                  [x]? Pain Management with/without modalities PRN                 [x]?  Manual Therapy                      [x]? Splinting                                   [x]? Scar Management                   []?Joint Protection/Training  []? Ergonomics                             []? Joint Mobilization                      []? Adaptive Equipment Assessment/Training                             [x]?  Manual Edema Mobilization   []? Myofascial Release                 []? Energy Conservation/Work Simplification  [x]? FM Coordination           []? Safety retraining/education per  individual diagnosis/goals  [x]? Desensitization                                   GOALS (Long term same as Short term):  1) Patient will demonstrate good understanding of home program (exercises/activities/diagnosis/prognosis/goals) with good accuracy. 2) Patient will demonstrate increased active/passive range of motion of their left thumb / Nikki Meño WFLs for ADL/IADL completion. 3) Patient will demonstrate /pinch strength of at least 40 / 5 pinch pounds of their left hand. 4) Patient to report decreased pain in their affected left distal  upper extremity from 9/10 to 3/10 or less with resistive functional use. 5) Increase in fine motor function as evidenced by time to complete 9-hole peg test by < 30  Seconds and improved ability to perform fasteners. 6) Patient to report a decrease in hypersensitivity from 100% to 20% or less in their left thumb/ wrist.   7) Patient to demonstrate decreased guarding of their affected extremity from 100% to 20% or less. 8) Patient will demonstrate a non-tender/non-adherent scar.    9) Patient will report ADL/ IADL  functions as Mod I/I using the affected left hand/ wrist.   10) Patient will decrease QuickDASH score to 30% or less for increased participation in daily functional activities. Pain Level:   7/10 in the L wrist/ thumb  sensitive, sore,  tight/ pulling pains  Subjective: \" I think I am moving a bit better today\". Objective:  Updated POC to be completed by 9/12/2021. INTERVENTION: COMPLETED: SPECIFICS/COMMENTS:   Modality:     US   To MP area dorsally 4 min and thumb thenar area 4 min. 3.3 MhZ, 20%, intensity . 08 to minimize scar adhesions and decrease post activity swelling   Fluidotherapy- L hand/ wrist X- 10 min Completed with gentle AROM thumb/ wrist as tolerated to increase pain free ROM and promote soft tissue elasticity        AROM:          L thumb AROM X       - AROM all planes 10x each   -- exercise balls  - in hand manipulation 5 multi sized  beads  - alternating opposition with small beads   L wrist AROM    X   --jux er ciser-   5  reps using   - wrist AROM all planes  - ball tabletop ROM ex for wrist (flexion/ ext)        L forearm rotation x Supination/ pronation with ball   AAROM:     Wrist flex/ext/ deviations  x Completed as tolerated   L thumb AAROM x All planes        PROM/Stretching:     L thumb  X- 15 reps    X IP/ MP flexion/ gentle composite flexion as tolerated   - thumb AROM all planes     L gentle wrist all planes x Not as painful        Scar Mass/Edema Control:     Scar and retrograde X  x - manual massage  -manual desensitization   - silicone gel provided to help scar   Strengthening:     - functional hand/ arm strengthening       x - lift and carry 1#-5#  - holding cup with adding marbles  - picking up large width containers  - UBE 1 min forward/ 1 min back   - gentle hand strengthening  X    X  X  X       - soft putty/ gentle  and manipulate decrease from 30x to 20x due to pain  - Xsoft putty thumb flexion  - Xsoft putty digital ABD 5x  - Xsoft putty tool ex  - -picking up 1# weighted ball 10 reps  - velcro board with key pinch/ only 1 x today due to thumb pain   Wrist/forearm strengthening x           -  2# decreased to 1# 15x wrist flex/ ext 10x   -6# yellow therabar/ twist in 10x- twist out 10x- bending 10x  - weighted dowel- 1# forearm rotation 10x / deviations 5x   Other:     Desensitization  X  Continue at home  massage with graded textures from smooth to slightly rough/ pt to continue at home for 10 min self massage - 2x/ day   Splint x Pt fit today with a large Comfort cool brace. Compliance is good. HEP   Reviewed all and given handout - see attached. Splint on and digital exer. And thumb IP flex/ext-see above.     x AROM- thumb extension, thumb ABD, IP blocking ex, supination/ pronation, wrist flexion/ extension/ encourage more self massage for scar mgmt and desensitization      Assessment/Comments: Pt is making Good progress toward stated plan of care. ROM and light strengthening is continued today with improving tolerance possibly due to the warmer weather. However, all tasks that involve putting pressure on the thumb remain difficult and painful.     -Rehab Potential: Good  -Requires OT Follow Up: Yes  Time In:  1500       Time Out: 1600       Visit #: 14 ( 17 used already)      Treatment Charges: Mins Time in - Time out  Units   Modalities: Fluido 10 5190-7619 1   Ther Exercise 10 8631-5262 1   Manual Therapy 5 2466-6248 0   Thera Activities 35 8847-4049 2   ADL/Home Mgt       Neuro Re-education      Gait Training      Group Therapy      Non-Billable Service Time      Other: ortho fit      Total Time/Units 50  4     -Response to Treatment: Pt is concerned about his prolonged recovery. Support provided. Goals: Goals for pt can be seen on initial eval occurring on 5/26/21, update 6-23-21 and 7-12-21. Plan:   [x]  Continue Plan of care: Re-evaluate status at next visit. Continue OT pain mgmt, ROM,  Increasing functional hand use and wrist/ hand strengthening.  Treatment covered based on POC and graduated to patient's progress. Pt education continues at each visit to obtain maximum benefits from skilled OT intervention.   []  [facility] of care:   []  Discharge:      Sy Webb OT/JOELLE  [id number]

## 2021-09-16 ENCOUNTER — TREATMENT (OUTPATIENT)
Dept: OCCUPATIONAL THERAPY | Age: 71
End: 2021-09-16
Payer: COMMERCIAL

## 2021-09-16 DIAGNOSIS — M18.12 ARTHRITIS OF CARPOMETACARPAL (CMC) JOINT OF LEFT THUMB: Primary | ICD-10-CM

## 2021-09-16 DIAGNOSIS — M65.4 TENOSYNOVITIS, DE QUERVAIN: ICD-10-CM

## 2021-09-16 PROCEDURE — 97530 THERAPEUTIC ACTIVITIES: CPT | Performed by: OCCUPATIONAL THERAPIST

## 2021-09-16 PROCEDURE — 97110 THERAPEUTIC EXERCISES: CPT | Performed by: OCCUPATIONAL THERAPIST

## 2021-09-16 PROCEDURE — 97022 WHIRLPOOL THERAPY: CPT | Performed by: OCCUPATIONAL THERAPIST

## 2021-09-16 PROCEDURE — 97140 MANUAL THERAPY 1/> REGIONS: CPT | Performed by: OCCUPATIONAL THERAPIST

## 2021-09-16 NOTE — PROGRESS NOTES
[facility] OCCUPATIONAL THERAPY   [address]  [facility] [address]  Dept: [address] OT Fax: [phone]    OCCUPATIONAL THERAPY   PROGRESS NOTE    Date:  2021                          Initial Evaluation Date: 2021    Patient Name:  Magdy Rueda. :  1950    Restrictions/Precautions: Activity as tolerated, Low fall risk  Diagnosis:    M65.4 (ICD-10-CM) - Tenosynovitis, de Quervain   M18.12 (ICD-10-CM) - Arthritis of carpometacarpal (CMC) joint of left thumb   Date of Surgery/Injury: 21 L thumb CMC arthroplasty revision, L FCR tenotomy/ debridement, L wrist arthropathy     Insurance/Certification information:  [address] [place] [address] [phone]  Plan of care signed (Y/N): Y ( thru 21)  Visit# / total visits: 15 /18 (New C9  to 21- date ext requested on 21) Completed 17 prior visits from  to      Referring Practitioner:  Dr Staton Common  Specific Practitioner Orders: [facility] advance in therapy. Wean out of his splint. Improve range of motion. May incorporate light strengthening at 8 weeks postop. Modalities as needed    Assessment of current deficits   []? Functional mobility             [x]? ADLs          [x]? Strength                  []? Cognition   []? Functional transfers           [x]? IADLs         []? Safety Awareness  []? Endurance   [x]? Fine Motor Coordination    []? Balance      []? Vision/perception   [x]? Sensation     []? Gross Motor Coordination [x]? ROM           [x]? Pain                        [x]? Edema          [x]? Scar Adhesion/Skin Integrity      OT PLAN OF CARE   OT POC based on physician orders, patient diagnosis and results of clinical assessment     Frequency/Duration: 3x/ week x 6 weeks  Specific OT Treatment to include:      [x]? Instruction in HEP                   Modalities:  [x]?  Therapeutic Exercise                 [x]? Ultrasound               []? Electrical Stimulation/Attended  [x]? PROM/Stretching                    as Mod I/I using the affected left hand/ wrist.   10) Patient will decrease QuickDASH score to 30% or less for increased participation in daily functional activities. Pain Level:   7/10 in the L wrist/ thumb  sensitive, sore,  tight/ pulling pains  Subjective:  Pt presents with no new complaints. Objective:  Updated POC to be completed by 9/12/2021. INTERVENTION: COMPLETED: SPECIFICS/COMMENTS:   Modality:     US   To MP area dorsally 4 min and thumb thenar area 4 min. 3.3 MhZ, 20%, intensity . 08 to minimize scar adhesions and decrease post activity swelling   Fluidotherapy- L hand/ wrist X- 10 min Completed with gentle AROM thumb/ wrist as tolerated to increase pain free ROM and promote soft tissue elasticity        AROM:          L thumb AROM X  x       - AROM all planes 10x each   -- exercise balls  - in hand manipulation 5 multi sized  beads  - alternating opposition with small beads   L wrist AROM  x  X   --jux er ciser-   5  reps using   - wrist AROM all planes  - ball tabletop ROM ex for wrist (flexion/ ext)        L forearm rotation x Supination/ pronation with ball   AAROM:     Wrist flex/ext/ deviations  x Completed as tolerated   L thumb AAROM x All planes        PROM/Stretching:     L thumb  X- 15 reps    X IP/ MP flexion/ gentle composite flexion as tolerated   - thumb AROM all planes     L gentle wrist all planes x Not as painful        Scar Mass/Edema Control:     Scar and retrograde X  x - manual massage  -manual desensitization   - silicone gel provided to help scar   Strengthening:     - functional hand/ arm strengthening       x - lift and carry 1#-5#  - holding cup with adding marbles  - picking up large width containers  - UBE 1 min forward/ 1 min back   - gentle hand strengthening  X    X  X  X       - soft putty/ gentle  and manipulate decrease from 30x to 20x due to pain  - Xsoft putty thumb flexion  - Xsoft putty digital ABD 5x  - Xsoft putty tool ex  - -picking up 1# weighted ball 10 reps  - velcro board with key pinch/ only 1 x today due to thumb pain  -3# red digiflex/  10x/ each digit 5x   Wrist/forearm strengthening x           -  2# decreased to 1# 15x wrist flex/ ext 10x   -6# yellow therabar/ twist in 10x- twist out 10x- bending 10x  - weighted dowel- 1# forearm rotation 10x / deviations 5x   Other:     Desensitization  X  Continue at home  massage with graded textures from smooth to slightly rough/ pt to continue at home for 10 min self massage - 2x/ day   Splint x Pt fit today with a large Comfort cool brace. Compliance is good. HEP   Reviewed all and given handout - see attached. Splint on and digital exer. And thumb IP flex/ext-see above.     x AROM- thumb extension, thumb ABD, IP blocking ex, supination/ pronation, wrist flexion/ extension/ encourage more self massage for scar mgmt and desensitization      Assessment/Comments: Pt is making Good progress toward stated plan of care. Tx continued as tolerated. Pain remains an issue and has not gone below 7/10 since Tx start. ROM is improving but tolerance for resistance remains an issue.   -Rehab Potential: Good  -Requires OT Follow Up: Yes  Time In:  1500       Time Out: 1600       Visit #: 14 ( 17 used already)      Treatment Charges: Mins Time in - Time out  Units   Modalities: Fluido 10 7571-1216 1   Ther Exercise 10 3662-0267 1   Manual Therapy 20 7378-9709 1   Thera Activities 20 3411-0972 1   ADL/Home Mgt       Neuro Re-education      Gait Training      Group Therapy      Non-Billable Service Time      Other: ortho fit      Total Time/Units 50  4     -Response to Treatment: Pt is concerned about his progress. Will hold re-eval til next visit. Goals: Goals for pt can be seen on initial eval occurring on 5/26/21, update 6-23-21 and 7-12-21. Plan:   [x]  Continue Plan of care: Re-evaluate status at next visit. Continue OT pain mgmt, ROM,  Increasing functional hand use and wrist/ hand strengthening.  Treatment covered based on POC and graduated to patient's progress. Pt education continues at each visit to obtain maximum benefits from skilled OT intervention.   []  [facility] of care:   []  Discharge:      Andrae Hdez OT/JOELLE  [id number]

## 2021-09-23 ENCOUNTER — TREATMENT (OUTPATIENT)
Dept: OCCUPATIONAL THERAPY | Age: 71
End: 2021-09-23
Payer: COMMERCIAL

## 2021-09-23 DIAGNOSIS — M65.4 TENOSYNOVITIS, DE QUERVAIN: ICD-10-CM

## 2021-09-23 DIAGNOSIS — M18.12 ARTHRITIS OF CARPOMETACARPAL (CMC) JOINT OF LEFT THUMB: Primary | ICD-10-CM

## 2021-09-23 PROCEDURE — 97022 WHIRLPOOL THERAPY: CPT | Performed by: OCCUPATIONAL THERAPIST

## 2021-09-23 PROCEDURE — 97110 THERAPEUTIC EXERCISES: CPT | Performed by: OCCUPATIONAL THERAPIST

## 2021-09-23 PROCEDURE — 97140 MANUAL THERAPY 1/> REGIONS: CPT | Performed by: OCCUPATIONAL THERAPIST

## 2021-09-23 PROCEDURE — 97530 THERAPEUTIC ACTIVITIES: CPT | Performed by: OCCUPATIONAL THERAPIST

## 2021-09-23 NOTE — PROGRESS NOTES
[facility] OCCUPATIONAL THERAPY   [address]  [facility] [address]  Dept: [address] Fax: [phone]    OCCUPATIONAL THERAPY   PROGRESS NOTE    Date:  2021                          Initial Evaluation Date: 2021    Patient Name:  Cheryl Mclean. :  1950    Restrictions/Precautions: Activity as tolerated, Low fall risk  Diagnosis:    M65.4 (ICD-10-CM) - Tenosynovitis, de Quervain   M18.12 (ICD-10-CM) - Arthritis of carpometacarpal (CMC) joint of left thumb   Date of Surgery/Injury: 21 L thumb CMC arthroplasty revision, L FCR tenotomy/ debridement, L wrist arthropathy     Insurance/Certification information:  [address] [place] [address] [phone]  Plan of care signed (Y/N): Y ( thru 21)  Visit# / total visits: 16 /18 (New C9  to 21- date ext requested on 21) Completed 17 prior visits from  to      Referring Practitioner:  Dr Moon Velasquez  Specific Practitioner Orders: [facility] advance in therapy. Wean out of his splint. Improve range of motion. May incorporate light strengthening at 8 weeks postop. Modalities as needed    Assessment of current deficits   []? Functional mobility             [x]? ADLs          [x]? Strength                  []? Cognition   []? Functional transfers           [x]? IADLs         []? Safety Awareness  []? Endurance   [x]? Fine Motor Coordination    []? Balance      []? Vision/perception   [x]? Sensation     []? Gross Motor Coordination [x]? ROM           [x]? Pain                        [x]? Edema          [x]? Scar Adhesion/Skin Integrity      OT PLAN OF CARE   OT POC based on physician orders, patient diagnosis and results of clinical assessment     Frequency/Duration: 3x/ week x 6 weeks  Specific OT Treatment to include:      [x]? Instruction in HEP                   Modalities:  [x]?  Therapeutic Exercise                 [x]? Ultrasound               []? Electrical Stimulation/Attended  [x]? PROM/Stretching                    [x]? Fluidotherapy          [x]?   Paraffin                   [x]? AAROM  [x]?  AROM                 []? Iontophoresis:   [x]? Arina Plants                                       []? Neuromuscular Re-Ed            []? ADL/IADL re-training    [x]?  Therapeutic Activity                  [x]? Pain Management with/without modalities PRN                 [x]?  Manual Therapy                      [x]? Splinting                                   [x]? Scar Management                   []?Joint Protection/Training  []? Ergonomics                             []? Joint Mobilization                      []? Adaptive Equipment Assessment/Training                             [x]?  Manual Edema Mobilization   []? Myofascial Release                 []? Energy Conservation/Work Simplification  [x]? FM Coordination           []? Safety retraining/education per  individual diagnosis/goals  [x]? Desensitization                                   GOALS (Long term same as Short term):  1) Patient will demonstrate good understanding of home program (exercises/activities/diagnosis/prognosis/goals) with good accuracy. 2) Patient will demonstrate increased active/passive range of motion of their left thumb / Esteban Falling WFLs for ADL/IADL completion. 3) Patient will demonstrate /pinch strength of at least 40 / 5 pinch pounds of their left hand. 4) Patient to report decreased pain in their affected left distal  upper extremity from 9/10 to 3/10 or less with resistive functional use. 5) Increase in fine motor function as evidenced by time to complete 9-hole peg test by < 30  Seconds and improved ability to perform fasteners. 6) Patient to report a decrease in hypersensitivity from 100% to 20% or less in their left thumb/ wrist.   7) Patient to demonstrate decreased guarding of their affected extremity from 100% to 20% or less. 8) Patient will demonstrate a non-tender/non-adherent scar.    9) Patient will report ADL/ IADL  functions as Mod I/I using the affected left hand/ wrist.   10) Patient will decrease QuickDASH score to 30% or less for increased participation in daily functional activities. Pain Level:   7/10 in the L wrist/ thumb  sensitive, sore,  tight/ pulling pains  Subjective:  Pt presents with no new complaints. Objective:  Updated POC to be completed by 9/12/2021. INTERVENTION: COMPLETED: SPECIFICS/COMMENTS:   Modality:     US   To MP area dorsally 4 min and thumb thenar area 4 min. 3.3 MhZ, 20%, intensity . 08 to minimize scar adhesions and decrease post activity swelling   Fluidotherapy- L hand/ wrist X- 10 min Completed with gentle AROM thumb/ wrist as tolerated to increase pain free ROM and promote soft tissue elasticity        AROM:          L thumb AROM X  x       - AROM all planes 10x each   -- exercise balls  - in hand manipulation 5 multi sized  beads  - alternating opposition with small beads   L wrist AROM  x  X   --jux er ciser-   5  reps using   - wrist AROM all planes  - ball tabletop ROM ex for wrist (flexion/ ext)        L forearm rotation x Supination/ pronation with ball   AAROM:     Wrist flex/ext/ deviations  x Completed as tolerated   L thumb AAROM x All planes        PROM/Stretching:     L thumb  X- 15 reps    X IP/ MP flexion/composite flexion as tolerated   - thumb AROM all planes     L  wrist all planes x Not as painful        Scar Mass/Edema Control:     Scar and retrograde X  x - manual massage  -manual desensitization   - silicone gel provided to help scar   Strengthening:     - functional hand/ arm strengthening       x - lift and carry 1#-5#  - holding cup with adding marbles  - picking up large width containers  - UBE 1 min forward/ 1 min back   - gentle hand strengthening  X    X  X  X    x   - soft putty/ gentle  and manipulate decrease from 30x to 20x due to pain  - Xsoft putty thumb flexion  - Xsoft putty digital ABD 5x  - Xsoft putty tool ex  - -picking up 1# weighted ball 10 reps  - ^ Celia Gentile board with key pinch 2x, large roll with handle 5x, large roll 10x  -3# red digiflex/  10x/ each digit 5x   Wrist/forearm strengthening            -  2# decreased to 1# 15x wrist flex/ ext 10x   -6# yellow therabar/ twist in 10x- twist out 10x- bending 10x  - weighted dowel- 1# forearm rotation 10x / deviations 5x   Other:     Desensitization  X  Continue at home  massage with graded textures from smooth to slightly rough/ pt to continue at home for 10 min self massage - 2x/ day   Splint x Pt fit today with a large Comfort cool brace. Compliance is good. HEP   Reviewed all and given handout - see attached. Splint on and digital exer. And thumb IP flex/ext-see above.     x AROM- thumb extension, thumb ABD, IP blocking ex, supination/ pronation, wrist flexion/ extension/ encourage more self massage for scar mgmt and desensitization      Assessment/Comments: Pt is making Good- progress toward stated plan of care. Tx continued as tolerated. Pain remains an issue and has not gone below 7/10 since Tx start. ROM is improving but tolerance for resistance is slow to improve. Pt was encouraged to wean from his thumb support. He agreed to try at home but was hesitent to do it at work. -Rehab Potential: Good  -Requires OT Follow Up: Yes  Time In:  1500       Time Out: 1600       Visit #: 16 ( 17 used already)      Treatment Charges: Mins Time in - Time out  Units   Modalities: Fluido 10 9909-0741 1   Ther Exercise 10 5018-5370 1   Manual Therapy 20 2766-1613 1   Thera Activities 20 9414-5868 1   ADL/Home Mgt       Neuro Re-education      Gait Training      Group Therapy      Non-Billable Service Time      Other: ortho fit      Total Time/Units 60  4     -Response to Treatment: Pt is concerned about his progress. Will hold re-eval til next visit. Goals: Goals for pt can be seen on initial eval occurring on 5/26/21, update 6-23-21 and 7-12-21.     Plan:   [x]  Continue Plan of care: hold re-evaluate status till next visit. Continue OT pain mgmt, ROM,  Increasing functional hand use and wrist/ hand strengthening. Treatment covered based on POC and graduated to patient's progress. Pt education continues at each visit to obtain maximum benefits from skilled OT intervention.   []  [facility] of care:   []  Discharge:      Andrae Hdez OT/L  [id number]

## 2021-09-26 DIAGNOSIS — M18.12 ARTHRITIS OF CARPOMETACARPAL (CMC) JOINT OF LEFT THUMB: ICD-10-CM

## 2021-09-28 ENCOUNTER — TREATMENT (OUTPATIENT)
Dept: OCCUPATIONAL THERAPY | Age: 71
End: 2021-09-28
Payer: COMMERCIAL

## 2021-09-28 DIAGNOSIS — M18.12 ARTHRITIS OF CARPOMETACARPAL (CMC) JOINT OF LEFT THUMB: Primary | ICD-10-CM

## 2021-09-28 DIAGNOSIS — M65.4 TENOSYNOVITIS, DE QUERVAIN: ICD-10-CM

## 2021-09-28 PROCEDURE — 97110 THERAPEUTIC EXERCISES: CPT | Performed by: OCCUPATIONAL THERAPIST

## 2021-09-28 PROCEDURE — 97022 WHIRLPOOL THERAPY: CPT | Performed by: OCCUPATIONAL THERAPIST

## 2021-09-28 PROCEDURE — 97530 THERAPEUTIC ACTIVITIES: CPT | Performed by: OCCUPATIONAL THERAPIST

## 2021-09-28 RX ORDER — AMITRIPTYLINE HYDROCHLORIDE 10 MG/1
TABLET, FILM COATED ORAL
Qty: 30 TABLET | Refills: 0 | Status: SHIPPED
Start: 2021-09-28 | End: 2021-10-25

## 2021-09-28 NOTE — PROGRESS NOTES
[facility] OCCUPATIONAL THERAPY   [address]  [place] [address]  Dept: [address] OT Fax: [phone]    OCCUPATIONAL THERAPY   PROGRESS NOTE/ RE-EVALUATION    Date:  2021                          Initial Evaluation Date: 2021    Patient Name:  Swetha Denise. :  1950    Restrictions/Precautions: Activity as tolerated, Low fall risk  Diagnosis:    M65.4 (ICD-10-CM) - Tenosynovitis, de Quervain   M18.12 (ICD-10-CM) - Arthritis of carpometacarpal (CMC) joint of left thumb   Date of Surgery/Injury: 21 L thumb CMC arthroplasty revision, L FCR tenotomy/ debridement, L wrist arthropathy     Insurance/Certification information:  [facility] [id number]  Plan of care signed (Y/N): Y ( thru 21)  Visit# / total visits: 17 /18 (New C9  to 21- date ext requested on 21) Completed 17 prior visits from  to      Referring Practitioner:  Dr Dar Schwab  Specific Practitioner Orders: [facility] advance in therapy. Wean out of his splint. Improve range of motion. May incorporate light strengthening at 8 weeks postop. Modalities as needed    Assessment of current deficits   []? Functional mobility             [x]? ADLs          [x]? Strength                  []? Cognition   []? Functional transfers           [x]? IADLs         []? Safety Awareness  []? Endurance   [x]? Fine Motor Coordination    []? Balance      []? Vision/perception   [x]? Sensation     []? Gross Motor Coordination [x]? ROM           [x]? Pain                        [x]? Edema          [x]? Scar Adhesion/Skin Integrity      OT PLAN OF CARE   OT POC based on physician orders, patient diagnosis and results of clinical assessment     Frequency/Duration: 3x/ week x 6 weeks  Specific OT Treatment to include:      [x]? Instruction in HEP                   Modalities:  [x]?  Therapeutic Exercise                 [x]? Ultrasound               []? Electrical Stimulation/Attended  [x]? PROM/Stretching                    [x]? Fluidotherapy          [x]?   Paraffin                   [x]? AAROM  [x]?  AROM                 []? Iontophoresis:   [x]? Curt aSwyer                                       []? Neuromuscular Re-Ed            []? ADL/IADL re-training    [x]?  Therapeutic Activity                  [x]? Pain Management with/without modalities PRN                 [x]?  Manual Therapy                      [x]? Splinting                                   [x]? Scar Management                   []?Joint Protection/Training  []? Ergonomics                             []? Joint Mobilization                      []? Adaptive Equipment Assessment/Training                             [x]?  Manual Edema Mobilization   []? Myofascial Release                 []? Energy Conservation/Work Simplification  [x]? FM Coordination           []? Safety retraining/education per  individual diagnosis/goals  [x]? Desensitization                                   GOALS (Long term same as Short term):  1) Patient will demonstrate good understanding of home program (exercises/activities/diagnosis/prognosis/goals) with good accuracy. (goal met for desensitization, splint wear/ care, edema control, scar mgmt, ROM, stretches and strengthening)    2) Patient will demonstrate increased active/passive range of motion of their left thumb / wrist to [facility] for ADL/IADL completion.(intermittent progress)    3) Patient will demonstrate /pinch strength of at least 40 / 5 pinch pounds of their left hand. (slow progress)    4) Patient to report decreased pain in their affected left distal  upper extremity from 9/10 to 3/10 or less with resistive functional use.  (slow progress- average pain level is 7/10)    5) Increase in fine motor function as evidenced by time to complete 9-hole peg test by < 30  Seconds and improved ability to perform fasteners. (goal met but progress is slow)    6) Patient to report a decrease in hypersensitivity from 100% to 20% or less in their left thumb/ wrist. (slow progress- hypersensitivity is at 80%)    7) Patient to demonstrate decreased guarding of their affected extremity from 100% to 20% or less. (slow progress- guarding is at 75%)    8) Patient will demonstrate a non-tender/non-adherent scar. (Slow progress- the scars remain very tender and sensitive/ adherence is minimal)    9) Patient will report ADL/ IADL  functions as Mod I/I using the affected left hand/ wrist. (Slow progress- pt is able to use the left hand/ wrist for light tasks only. Tolerance for tasks that involve putting pressure on the thumb or wrist remains limited due to pain. Use of the left comfort cool splint remains at 100% during work and approximately 65-75% at home.)     10) Patient will decrease QuickDASH score to 30% or less for increased participation in daily functional activities. (Slow progress- Quick Dash is 77% from 82% on admission)      Pain Level:   7/10 in the L wrist/ thumb  sensitive, sore,  tight/ pulling pains  Subjective:  Pt presents with no new complaints. Objective:  Updated POC to be completed by 9/12/2021. INTERVENTION: COMPLETED: SPECIFICS/COMMENTS:   Modality:     US   To MP area dorsally 4 min and thumb thenar area 4 min. 3.3 MhZ, 20%, intensity . 08 to minimize scar adhesions and decrease post activity swelling   Fluidotherapy- L hand/ wrist X- 10 min Completed with gentle AROM thumb/ wrist as tolerated to increase pain free ROM and promote soft tissue elasticity        AROM:          L thumb AROM X  x       - AROM all planes 10x each   -- exercise balls  - in hand manipulation 5 multi sized  beads  - alternating opposition with small beads   L wrist AROM  x  X   --jux er ciser-   5  reps using   - wrist AROM all planes  - ball tabletop ROM ex for wrist (flexion/ ext)        L forearm rotation x Supination/ pronation with ball   AAROM:     Wrist flex/ext/ deviations  x Completed as tolerated   L thumb AAROM x All planes        PROM/Stretching:     L thumb  X- 15 reps    X IP/ MP flexion/composite flexion as tolerated   - thumb AROM all planes     L  wrist all planes x Not as painful        Scar Mass/Edema Control:     Scar and retrograde    - manual massage  -manual desensitization   - silicone gel provided to help scar   Strengthening:     - functional hand/ arm strengthening       x - lift and carry 1#-5#  - holding cup with adding marbles  - picking up large width containers  - UBE 1 min forward/ 1 min back   - gentle hand strengthening  X    X  X  X    x   - soft putty/ gentle  and manipulate decrease from 30x to 20x due to pain  - Xsoft putty thumb flexion  - Xsoft putty digital ABD 5x  - Xsoft putty tool ex  - -picking up 1# weighted ball 10 reps  - ^ velcro board with key pinch 2x, large roll with handle 5x, large roll 10x  -3# red digiflex/  10x/ each digit 5x   Wrist/forearm strengthening            -  2# decreased to 1# 15x wrist flex/ ext 10x   -6# yellow therabar/ twist in 10x- twist out 10x- bending 10x  - weighted dowel- 1# forearm rotation 10x / deviations 5x   Other:     Desensitization  X  Continue at home  massage with graded textures from smooth to slightly rough/ pt to continue at home for 10 min self massage - 2x/ day   Splint x Pt fit today with a large Comfort cool brace. Compliance is good. HEP   Reviewed all and given handout - see attached. Splint on and digital exer. And thumb IP flex/ext-see above.     x AROM- thumb extension, thumb ABD, IP blocking ex, supination/ pronation, wrist flexion/ extension/ encourage more self massage for scar mgmt and desensitization      Assessment/Comments: Pt is making Good- progress toward stated plan of care. Status is retested today. Scattered, slow progress is noted.  The main issues at this point are persistent pain/ hyper- sensitivity, increased hand/ wrist weakness and decreased tolerance for using the

## 2021-09-30 ENCOUNTER — TREATMENT (OUTPATIENT)
Dept: OCCUPATIONAL THERAPY | Age: 71
End: 2021-09-30
Payer: COMMERCIAL

## 2021-09-30 DIAGNOSIS — M65.4 TENOSYNOVITIS, DE QUERVAIN: ICD-10-CM

## 2021-09-30 DIAGNOSIS — M18.12 ARTHRITIS OF CARPOMETACARPAL (CMC) JOINT OF LEFT THUMB: Primary | ICD-10-CM

## 2021-09-30 PROCEDURE — 97110 THERAPEUTIC EXERCISES: CPT | Performed by: OCCUPATIONAL THERAPIST

## 2021-09-30 PROCEDURE — 97022 WHIRLPOOL THERAPY: CPT | Performed by: OCCUPATIONAL THERAPIST

## 2021-09-30 PROCEDURE — 97530 THERAPEUTIC ACTIVITIES: CPT | Performed by: OCCUPATIONAL THERAPIST

## 2021-10-01 NOTE — PROGRESS NOTES
[facility] OCCUPATIONAL THERAPY   [address]  [facility] [address]  Dept: [address] OT Fax: [phone]    OCCUPATIONAL THERAPY   PROGRESS NOTE/ RE-EVALUATION    Date:  10/1/2021                          Initial Evaluation Date: 2021    Patient Name:  Ray Dill. :  1950    Restrictions/Precautions: Activity as tolerated, Low fall risk  Diagnosis:    M65.4 (ICD-10-CM) - Tenosynovitis, de Quervain   M18.12 (ICD-10-CM) - Arthritis of carpometacarpal (CMC) joint of left thumb   Date of Surgery/Injury: 21 L thumb CMC arthroplasty revision, L FCR tenotomy/ debridement, L wrist arthropathy     Insurance/Certification information:  [address] [place] [id number]  Plan of care signed (Y/N): Y ( thru 21)  Visit# / total visits:  /18 (New C9  to 21- date ext requested on 21) Completed 17 prior visits from  to      Referring Practitioner:  Dr Reny Rivero  Specific Practitioner Orders: [facility] advance in therapy. Wean out of his splint. Improve range of motion. May incorporate light strengthening at 8 weeks postop. Modalities as needed    Assessment of current deficits   []? Functional mobility             [x]? ADLs          [x]? Strength                  []? Cognition   []? Functional transfers           [x]? IADLs         []? Safety Awareness  []? Endurance   [x]? Fine Motor Coordination    []? Balance      []? Vision/perception   [x]? Sensation     []? Gross Motor Coordination [x]? ROM           [x]? Pain                        [x]? Edema          [x]? Scar Adhesion/Skin Integrity      OT PLAN OF CARE   OT POC based on physician orders, patient diagnosis and results of clinical assessment     Frequency/Duration: 3x/ week x 6 weeks  Specific OT Treatment to include:      [x]? Instruction in HEP                   Modalities:  [x]?  Therapeutic Exercise                 [x]? Ultrasound               []? Electrical Stimulation/Attended  [x]? PROM/Stretching                    [x]? Fluidotherapy          [x]?   Paraffin                   [x]? AAROM  [x]?  AROM                 []? Iontophoresis:   [x]? Helena Locust                                       []? Neuromuscular Re-Ed            []? ADL/IADL re-training    [x]?  Therapeutic Activity                  [x]? Pain Management with/without modalities PRN                 [x]?  Manual Therapy                      [x]? Splinting                                   [x]? Scar Management                   []?Joint Protection/Training  []? Ergonomics                             []? Joint Mobilization                      []? Adaptive Equipment Assessment/Training                             [x]?  Manual Edema Mobilization   []? Myofascial Release                 []? Energy Conservation/Work Simplification  [x]? FM Coordination           []? Safety retraining/education per  individual diagnosis/goals  [x]? Desensitization                                   GOALS (Long term same as Short term):  1) Patient will demonstrate good understanding of home program (exercises/activities/diagnosis/prognosis/goals) with good accuracy. (goal met for desensitization, splint wear/ care, edema control, scar mgmt, ROM, stretches and strengthening)    2) Patient will demonstrate increased active/passive range of motion of their left thumb / wrist to [facility] for ADL/IADL completion.(intermittent progress)    3) Patient will demonstrate /pinch strength of at least 40 / 5 pinch pounds of their left hand. (slow progress)    4) Patient to report decreased pain in their affected left distal  upper extremity from 9/10 to 3/10 or less with resistive functional use.  (slow progress- average pain level is 7/10)    5) Increase in fine motor function as evidenced by time to complete 9-hole peg test by < 30  Seconds and improved ability to perform fasteners. (goal met but progress is slow)    6) Patient to report a decrease in hypersensitivity from 100% to 20% or less in their left thumb/ wrist. (slow progress- hypersensitivity is at 80%)    7) Patient to demonstrate decreased guarding of their affected extremity from 100% to 20% or less. (slow progress- guarding is at 75%)    8) Patient will demonstrate a non-tender/non-adherent scar. (Slow progress- the scars remain very tender and sensitive/ adherence is minimal)    9) Patient will report ADL/ IADL  functions as Mod I/I using the affected left hand/ wrist. (Slow progress- pt is able to use the left hand/ wrist for light tasks only. Tolerance for tasks that involve putting pressure on the thumb or wrist remains limited due to pain. Use of the left comfort cool splint remains at 100% during work and approximately 65-75% at home.)     10) Patient will decrease QuickDASH score to 30% or less for increased participation in daily functional activities. (Slow progress- Quick Dash is 77% from 82% on admission)      Pain Level:   7/10 in the L wrist/ thumb  sensitive, sore,  tight/ pulling pains  Subjective:  Pt presents with no new complaints. Objective:  Updated POC to be completed by 9/12/2021. INTERVENTION: COMPLETED: SPECIFICS/COMMENTS:   Modality:     US   To MP area dorsally 4 min and thumb thenar area 4 min. 3.3 MhZ, 20%, intensity . 08 to minimize scar adhesions and decrease post activity swelling   Fluidotherapy- L hand/ wrist X- 10 min Completed with gentle AROM thumb/ wrist as tolerated to increase pain free ROM and promote soft tissue elasticity        AROM:          L thumb AROM X  x       - AROM all planes 10x each   -- exercise balls  - in hand manipulation 5 multi sized  beads  - alternating opposition with small beads   L wrist AROM  x  X  x --jux er ciser-   5  reps using   - wrist AROM all planes  - ball tabletop ROM ex for wrist (flexion/ ext)        L forearm rotation x Supination/ pronation with ball   AAROM:     Wrist flex/ext/ deviations  x Completed as tolerated   L thumb AAROM x All planes        PROM/Stretching:     L thumb  X- 15 reps    X IP/ MP flexion/composite flexion as tolerated   - thumb AROM all planes     L  wrist all planes x Not as painful        Scar Mass/Edema Control:     Scar and retrograde    - manual massage  -manual desensitization   - silicone gel provided to help scar   Strengthening:     - functional hand/ arm strengthening       x - lift and carry 1#-5#  - holding cup with adding marbles  - picking up large width containers  - UBE 1 min forward/ 1 min back   - gentle hand strengthening  X    X  X      x  x - soft putty/ gentle  and manipulate decrease from 30x to 20x due to pain  - Xsoft putty thumb flexion  - Xsoft putty digital ABD 5x  - Xsoft putty tool ex  - ^ velcro board with key pinch 2x, large roll with handle 5x, large roll 10x  -3# red digiflex/  10x/ each digit 5x   Wrist/forearm strengthening     x       -  2# decreased to 1# 15x wrist flex/ ext 10x   -6# yellow therabar/ twist in 10x- twist out 10x- bending 10x  - weighted dowel- 1# forearm rotation 10x / deviations 5x   Other:     Desensitization  X  Continue at home  massage with graded textures from smooth to slightly rough/ pt to continue at home for 10 min self massage - 2x/ day   Splint x Pt fit today with a large Comfort cool brace. Compliance is good. HEP   Reviewed all and given handout - see attached. Splint on and digital exer. And thumb IP flex/ext-see above.     x AROM- thumb extension, thumb ABD, IP blocking ex, supination/ pronation, wrist flexion/ extension/ encourage more self massage for scar mgmt and desensitization      Assessment/Comments: Pt is making Good- progress toward stated plan of care. Status is retested on 9-28-21. Scattered, slow progress is noted.  The main issues at this point are persistent pain/ hyper- sensitivity, increased hand/ wrist weakness and decreased tolerance for using the left thumb/ wrist. Pt remains very guarded of the left hand and is overly reliant on use of his comfort cool splint. At this point, progress has slowed significantly and progression is limited. Pt to discuss with MD at next visit. Potential for further recovery is low if pain/ sensitivity does not improve. Pt may benefit from pain mgmt care then referral back to therapy whe pain levels are more under control. Left wrist/ hand AROM:   Wrist flexion 0-25 to 0-75  Wrist extension- to neutral only to 0-35  Wrist RD- 0-5  Wrist UD- 0-25  Thumb IP- 0-30 to 0-35  Thumb ABD - 0 to 0-55  Thumb radial ext- 0 to 0-50    Opposition- intact to each digit except for the small finger    L hand DPC (cm):   index/ -3 to -2   middle/-2 to -1   ring/-3 to -1    small/ -4 to -1    L - 5# with pain  L lateral pinch- 1.5# with pain    L Nine Hole testing- 38 sec      -Rehab Potential: Good  -Requires OT Follow Up: Yes  Time In:  1515       Time Out: 1600       Visit #: 17 ( 17 used already)      Treatment Charges: Mins Time in - Time out  Units   Modalities: Fluido 10 0766-4936 1   Ther Exercise 15 [id number] 1   Manual Therapy      Thera Activities 20 [id number] 1   ADL/Home Mgt       Neuro Re-education      Gait Training      Group Therapy      Non-Billable Service Time      Other: ortho fit      Total Time/Units 45  3     -Response to Treatment: Pt is discouraged by his status. Support provided. Goals: Goals for pt can be seen on initial eval occurring on 5/26/21, update 6-23-21 and 7-12-21. Plan:   [x]  Continue Plan of care: No additional Therapy is recommended until pain/ sensitivity is more controlled. Will await MD recommendations. Treatment covered based on POC and graduated to patient's progress. Pt education continues at each visit to obtain maximum benefits from skilled OT intervention.   []  [facility] of care:   []  Discharge:      Ximena Pedroza OT/L  [id number]

## 2021-10-04 ENCOUNTER — OFFICE VISIT (OUTPATIENT)
Dept: ORTHOPEDIC SURGERY | Age: 71
End: 2021-10-04
Payer: COMMERCIAL

## 2021-10-04 VITALS — HEIGHT: 69 IN | BODY MASS INDEX: 24.88 KG/M2 | RESPIRATION RATE: 18 BRPM | WEIGHT: 168 LBS

## 2021-10-04 DIAGNOSIS — M19.042 DEGENERATIVE ARTHRITIS OF METACARPOPHALANGEAL JOINT OF LEFT THUMB: ICD-10-CM

## 2021-10-04 DIAGNOSIS — G56.32 RADIAL NEURITIS, LEFT: Primary | ICD-10-CM

## 2021-10-04 PROCEDURE — 25600 CLTX DST RDL FX/EPHYS SEP WO: CPT | Performed by: ORTHOPAEDIC SURGERY

## 2021-10-04 NOTE — PROGRESS NOTES
Chief Complaint   Patient presents with    Follow Up After Procedure     5 months out, left thumb revision arthroplasty with internal brace and wrist arthroscopy with debridement of TFCC tear         India Dumont is a 65y.o. year old  who presents for follow up of left thumb 5 months out. He relates that his thumb is still quite bothersome to him. He is having paresthesias and allodynia in the radial aspect of the thumb. In addition reports numbness into the index finger as well as the thumb. I did review his therapy notes. He appears to be plateauing in therapy secondary to continued hypersensitivity in the left hand. He reports that he is able to work with the current restrictions. He is frustrated with his lack of progress.       Past Medical History:   Diagnosis Date    Cancer [facility])     prostate just recently DX July 2018    History of blood transfusion     1985 after \"ulcer surgery\"    Hyperlipidemia     Hypertension     Left wrist pain     Pain     low back, sacral     Past Surgical History:   Procedure Laterality Date    CATARACT REMOVAL WITH IMPLANT Right 07/31/2018    CATARACT REMOVAL WITH IMPLANT Left 07/02/2019    CYST REMOVAL      lt. axilla    HAND SURGERY      HEMORRHOID SURGERY      HERNIA REPAIR      INTRACAPSULAR CATARACT EXTRACTION Left 7/2/2019    LEFT EYE CATARACT EMULSIFICATION IOL IMPLANT - COMPLEX performed by Michael Arroyo MD at [facility] Bilateral 08/17/2017    bilateral sacroiliac injection #1    NERVE BLOCK Bilateral 10/25/2017    Bilateral lumbar transforaminal epidural steroid injection    NERVE BLOCK Bilateral 11/02/2017    lumbar transforaminal epidural steriod injection #2    OTHER SURGICAL HISTORY  07-13-12    excision of cyst right 5th toe    [place] XCAPSL CTRC RMVL INSJ IO LENS PROSTH W/O ECP Right 7/31/2018    CATARACT EXTRACTION WITH IOL RIGHT EYE performed by Michael Arroyo MD at [facility] repair    WRIST ARTHROPLASTY Left 5/12/2021    WITH REVISION CMC ARTHROPLASTY AND MINI TIGHTROPE APPLICATION APPLICATION EXTENSOR TENOSYNOVECTOMY AND FCR TENOTOMY  ; PARTIAL TRAPEZOIDECTOMY [facility] Fi[facility]lla [facility] WRIST ARTHROSCOPY Left 5/12/2021    LEFT WRIST ARTHROSCOPY performed by Fiorella Prado MD at [facility] OR       Current Outpatient Medications:     amitriptyline (ELAVIL) 10 MG tablet, TAKE 1 TABLET BY MOUTH EVERY DAY AT NIGHT, Disp: 30 tablet, Rfl: 0    pantoprazole (PROTONIX) 40 MG tablet, TAKE 1 TABLET BY MOUTH EVERY DAY, Disp: , Rfl:     simvastatin (ZOCOR) 20 MG tablet, TAKE 1 TABLET BY MOUTH EVERY DAY, Disp: , Rfl:     diclofenac sodium (VOLTAREN) 1 % GEL, Apply 2 g topically 4 times daily, Disp: 2 Tube, Rfl: 1    amLODIPine (NORVASC) 10 MG tablet, Take 10 mg by mouth daily, Disp: , Rfl:     gabapentin (NEURONTIN) 100 MG capsule, Take 3 capsules by mouth 3 times daily for 30 days.  Intended supply: 30 days, Disp: 270 capsule, Rfl: 0  Allergies   Allergen Reactions    Codeine Nausea And Vomiting and Other (See Comments)     Dizziness, light headed    Motrin [Ibuprofen Micronized] Nausea And Vomiting     GI upset     Social History     Socioeconomic History    Marital status:      Spouse name: Not on file    Number of children: Not on file    Years of education: Not on file    Highest education level: Not on file   Occupational History    Not on file   Tobacco Use    Smoking status: Never Smoker    Smokeless tobacco: Never Used   Vaping Use    Vaping Use: Never used   Substance and Sexual Activity    Alcohol use: No    Drug use: No    Sexual activity: Not on file   Other Topics Concern    Not on file   Social History Narrative    Not on file     Social Determinants of Health     Financial Resource Strain:     Difficulty of Paying Living Expenses:    Food Insecurity:     Worried About Running Out of Food in the Last Year:     [address] in the Last Year:    Transportation Needs:     Lack of Transportation (Medical):  Lack of Transportation (Non-Medical):    Physical Activity:     Days of Exercise per Week:     Minutes of Exercise per Session:    Stress:     Feeling of Stress :    Social Connections:     Frequency of Communication with Friends and Family:     Frequency of Social Gatherings with Friends and Family:     Attends Rastafarian Services:     Active Member of Clubs or Organizations:     Attends Club or Organization Meetings:     Marital Status:    Intimate Partner Violence:     Fear of Current or Ex-Partner:     Emotionally Abused:     Physically Abused:     Sexually Abused:      Family History   Problem Relation Age of Onset    Cancer Mother     Cancer Father        Skin: (-) rash,(-) psoriasis,(-) eczema, (-)skin cancer. Musculoskeletal: Left thumb pain  Neurologic: (-) epilepsy, (-)seizures,(-) brain tumor,(-) TIA, (-)stroke, (-)headaches, (-)Parkinson disease,(-) memory loss, (-) LOC. Cardiovascular: (-) Chest pain, (-) swelling in legs/feet, (-) SOB, (-) cramping in legs/feet with walking. SUBJECTIVE:      Constitutional:    The patient is alert and oriented x 3, appears to be stated age and in no distress. Left upper extremity: Nontender of the shoulder and elbow region. Negative Tinel's of the cubital tunnel. Hypersensitivity over the radial sensory nerve just proximal to his dorsal scar. Positive Tinel's and Candido's maneuver over the median nerve with radiation to the thumb and index fingers. Negative CMC grind test.  Remains with sensitivity and tenderness over his MCP joint of the thumb with limited range of motion. Positive MCP joint grind test.  Negative instability. Opposition is limited to the tip of the little finger with pain over the dorsal aspect of the thumb. Radial, ulnar, median nerves are grossly intact. Diminished sensation in the radial sensory nerve ulnar to his dorsal scar.   Brisk capillary refill of digits. Full index through small fingers flexion extension. Negative atrophy. Xrays: X-rays of his left hand dated August 19, 2021 reviewed. This demonstrates a revision CMC joint arthroplasty. There is degenerative changes particular of the MCP joint of the thumb. Radiographic findings reviewed with patient      Impression: 5 months postop left thumb revision arthroplasty with continued pain particular in the radial sensory nerve distribution  Left thumb MCP joint arthritis  Possible left carpal tunnel syndrome      Plan: Today's findings were explained the patient. Continued pain in the left thumb involving radial sensory nerve distribution as well as MCP joint arthritis. Treatment options were explained discussed. Recommended a EMG nerve conduction study be completed of the left upper extremity particularly looking at carpal tunnel syndrome and radial sensory neuritis. In addition discussed MCP joint cortisone injection. If approved by [facility] he will follow-up for injections and/or after nerve test is completed.   Continue with current restrictions

## 2021-10-08 DIAGNOSIS — G56.32 RADIAL NEURITIS, LEFT: Primary | ICD-10-CM

## 2021-10-08 DIAGNOSIS — G56.02 CARPAL TUNNEL SYNDROME OF LEFT WRIST: ICD-10-CM

## 2021-10-21 ENCOUNTER — OFFICE VISIT (OUTPATIENT)
Dept: PHYSICAL MEDICINE AND REHAB | Age: 71
End: 2021-10-21
Payer: COMMERCIAL

## 2021-10-21 VITALS — BODY MASS INDEX: 24.88 KG/M2 | HEIGHT: 69 IN | WEIGHT: 168 LBS

## 2021-10-21 DIAGNOSIS — G56.02 CARPAL TUNNEL SYNDROME OF LEFT WRIST: ICD-10-CM

## 2021-10-21 DIAGNOSIS — G56.32 RADIAL NEURITIS, LEFT: ICD-10-CM

## 2021-10-21 PROCEDURE — 95912 NRV CNDJ TEST 11-12 STUDIES: CPT | Performed by: PHYSICAL MEDICINE & REHABILITATION

## 2021-10-21 PROCEDURE — 95886 MUSC TEST DONE W/N TEST COMP: CPT | Performed by: PHYSICAL MEDICINE & REHABILITATION

## 2021-10-21 PROCEDURE — 99243 OFF/OP CNSLTJ NEW/EST LOW 30: CPT | Performed by: PHYSICAL MEDICINE & REHABILITATION

## 2021-10-21 NOTE — Clinical Note
Zafar Davis,  Really iffy all on the radial sensory. Could go either way with these findings but I called it because he does have a sensory deficit there. He does not seem symptomatic of the cubital we found (it was mild) but is symptomatic for the carpal.  I know it isn't your area but he probably needs checked for polyneuropathy with all these abnormalities in his arm.

## 2021-10-21 NOTE — PROGRESS NOTES
[id number]  Electrodiagnostic Laboratory  *Accredited by the [address] [place] [address] with exemplary status  [address]  Phone: [phone]  Fax: [phone]    Referring Provider: Ashtyn Cormier MD  Primary Care Physician: Alsysa Moon MD  Patient Name: Matthew Benitez. Patient YOB: 1950  Gender: male  BMI: Body mass index is 24.81 kg/m². Height 5' 9\" (1.753 m), weight 168 lb (76.2 kg). 10/21/2021    Description of clinical problem:   Chief Complaint   Patient presents with    Extremity Pain     pain from the thumb up to the elbow. pain would be 8-9/10. night time is worse.  Numbness     numbness usually in the pointer finger and has been going to all fingers. comes and goes and does not last long.  Extremity Weakness     decrease strength in left side. Sensory NCS      Nerve / Sites Rec. Site Peak Lat PP Amp Segments Distance Velocity Temp.      ms µV  cm m/s °C   L Median - Digit II (Antidromic)      Palm Dig II 2.40 49.2 Palm - Dig II 7 46 32      Wrist Dig II 4.11* 30.5 Wrist - Dig II 14 43 32   L Ulnar - Digit V (Antidromic)      Wrist Dig V 4.17* 22.6 Wrist - Dig V 14 44 23.3   L Radial - Anatomical snuff box (Forearm)      Forearm Wrist 2.60 9.6* Forearm - Wrist 10 48 32   R Radial - Anatomical snuff box (Forearm)      Forearm Wrist 2.92 12.8 Forearm - Wrist 10 45 32   L Dorsal ulnar cutaneous - Hand dorsum (Forearm)      Forearm Hand dorsum 2.86 8.2 Forearm - Hand dorsum 8 37 32   L Medial antebrachial cutaneous - Forearm (Elbow)      Elbow Forearm 3.75 9.2 Elbow - Forearm 10 33 32   R Medial antebrachial cutaneous - Forearm (Elbow)      Elbow Forearm 2.76 11.9 Elbow - Forearm 10 52 32   L Lateral antebrachial cutaneous - Forearm (Elbow)      Elbow Forearm 3.07 7.0 Elbow - Forearm 10 46 32   R Lateral antebrachial cutaneous - Forearm (Elbow)      Elbow Forearm 4.06 13.4 Elbow - Forearm 10 31 32       Motor NCS      Nerve / Sites Muscle Onset Amplitude Segments Distance Velocity Temp.     ms mV  cm m/s °C   L Median - APB      Palm APB 2.14 15.3 Palm - APB   32      Wrist APB 3.75 13.3 Wrist - Palm 8 50 32      Elbow APB 7.76 12.2 Elbow - Wrist 21 52 22.8   L Ulnar - ADM      Wrist ADM 3.65 10.0 Wrist - ADM 8  32      B. Elbow ADM 7.55 9.9 B. Elbow - Wrist 22 56 32      A. Elbow ADM 10.21 9.7 A. Elbow - B. Elbow 10 38* 32       F Wave      Nerve Fmin % F    ms %   L Median - APB 29.32 100   L Ulnar - ADM 31.88 100       EMG      EMG Summary Table     Spontaneous MUAP Recruitment   Muscle Nerve Roots IA Fib PSW Fasc Amp Dur. PPP Pattern   L. Biceps brachii Musculocutaneous C5-C6 N None None None N N N N   L. Triceps brachii Radial C6-C8 N None None None N N N N   L. Pronator teres Median C6-C7 N None None None N N N N   L. Flexor digitorum profundus, dig 4 & 5 Ulnar C8-T1 N None None None N N N Reduced   L. Extensor indicis proprius Radial C7-C8 N None None None N N N N   L. First dorsal interosseous Ulnar C8-T1 N None None None N N N N   L. Abductor pollicis brevis Median I0-M0 N None None None N N N N   L. Cervical paraspinals (low)  - N None None None NT NT NT NT       Study Limitations:  none    Summary of Findings:   Nerve conduction studies:   · The following nerve conduction studies were abnormal:   · The left median sensory latency at the wrist is mildly prolonged. · The left ulnar sensory latency at the wrist is mildly prolonged. · The left radial sensory response is borderline small 9.6 compared to our laboratory standard of 11 mV. So a comparison was made contralaterally to the asymptomatic side and the amplitude on the right was 12.8. A 50% difference in amplitude is our laboratory standard on side to side comparison. · The left ulnar motor conduction velocity across the elbow was focally slow.    · Screening studies of the medial and lateral antebrachial cutaneous nerves with contralateral comparison, left dorsal ulnar cutaneous and left median motor studies were all normal in latency, amplitude and conduction velocity. Needle EMG:   · Needle EMG was performed using a concentric needle. · The following abnormalities were seen on needle EMG: reduced motor unit recruitment in the left flexor digitorum profundus to the fourth digit   All other muscles tested, including radial muscles, median muscles proximal and distal to the carpal tunnel and screening of myotomes C5 through T1 and cervical paraspinals (as listed in the table above) demonstrated normal amplitude, duration, phases and recruitment and no active denervation signs were seen. Diagnostic Interpretation: This study was complexly abnormal.     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 · Location: left, cannot be localized due to axonal nature   · Nature: Noe.Radha  ] Axonal   [  ] Demyelinating  [  ] Mixed axonal and demyelinating     [ X ] Sensory [  ] Motor               [  ] Mixed sensorimotor     [  ] with active denervation       Noe.Radha  ] without active denervation  · Duration: Acute  · Severity: mild/borderline  · Prognosis: Poor. The prognosis for recovery of axonal lesions is poor and dependant on collateral sprouting and reinnervation. Electrodiagnosis: There is electrodiagnostic evidence of a median mononeuropathy. · Location: left at the wrist.   · Nature: [  ] Axonal   [ X ] Demyelinating  [  ] Mixed axonal and demyelinating     [ X ] Sensory [  ] Motor               [  ] Mixed sensorimotor     [  ] with active denervation       Noe.Radha ] without active denervation  · Duration: Acute  · Severity: mild  · Prognosis: Good. The prognosis for recovery of demyelinating lesions is good if the cause is alleviated. Electrodiagnosis: There is electrodiagnostic evidence of a ulnar neuropathy. · Location: left at the elbow.    · Nature: [  ] Axonal   Noe.Radha ] Demyelinating  [  ] Mixed axonal and demyelinating     [  ] Sensory [ ] Motor               [ X ] Mixed sensorimotor     [  ] with active denervation       Marianne.Hikes  ] without active denervation  · Duration: Subacute  · Severity: mild  · Prognosis: Good. The prognosis for recovery of demyelinating lesions is good if the cause is alleviated. This pattern of multiple upper extremity neuropathies is highly suggestive of an underlying polyneuropathy. If clinically concerned for polyneuropathy consider EMG of the lower extremities to further evaluate. Previous Study: no      Follow up EMG is recommended if no surgical intervention and symptoms persist in one year     Technologist: Pamella Tello  Physician:    Vanessa Lozano D.O., P.T. Board Certified Physical Medicine and Rehabilitation  Board Certified Electrodiagnostic Medicine    Nerve conduction studies and electromyography were performed according to our laboratory policies and procedures which can be provided upon request. All abnormal values are identified in the table.  Laboratory normal values can also be provided upon request.       Cc: MD Ezequiel Mccoy MD

## 2021-10-21 NOTE — PROGRESS NOTES
[id number]  Electrodiagnostic Laboratory  *Accredited by the [address] [place] [address] with exemplary status  [address] [facility] [place]  Phone: [phone]  Fax: [phone]      Date of Examination: 10/21/21  Patient Name: Stephen Ying.  is a 65y.o. year old male who was seen today regarding   Chief Complaint   Patient presents with    Extremity Pain     Patient reports pain starting at the base of the thumb radiating up to the elbow. Pain scale is  8-9/10. Pain is worse at night.  Numbness     numbness usually in the pointer finger and has been going to all fingers. comes and goes and does not last long.  Extremity Weakness     decrease strength in left side. The pain is described as sharp. The pain has been present since May of 2021 following left wrist arthroplasty. The symptoms are intermittent. Previous workup has included: Xray left wrist, MRI left wrist reviewed in [facility].     Past Medical History:   Diagnosis Date    Cancer [facility])     prostate just recently DX July 2018    History of blood transfusion     1985 after \"ulcer surgery\"    Hyperlipidemia     Hypertension     Left wrist pain     Pain     low back, sacral       Past Surgical History:   Procedure Laterality Date    CATARACT REMOVAL WITH IMPLANT Right 07/31/2018    CATARACT REMOVAL WITH IMPLANT Left 07/02/2019    CYST REMOVAL      lt. axilla    HAND SURGERY      HEMORRHOID SURGERY      HERNIA REPAIR      INTRACAPSULAR CATARACT EXTRACTION Left 7/2/2019    LEFT EYE CATARACT EMULSIFICATION IOL IMPLANT - COMPLEX performed by Kenji Lua MD at [facility] Bilateral 08/17/2017    bilateral sacroiliac injection #1    NERVE BLOCK Bilateral 10/25/2017    Bilateral lumbar transforaminal epidural steroid injection    NERVE BLOCK Bilateral 11/02/2017    lumbar transforaminal epidural steriod injection #2    OTHER SURGICAL HISTORY  07-13-12    excision of cyst right 5th toe    SC XCAPSL CTRC RMVL INSJ IO LENS PROSTH W/O ECP Right 7/31/2018    CATARACT EXTRACTION WITH IOL RIGHT EYE performed by Murray Urbina MD at [facility]      ulcer repair    WRIST ARTHROPLASTY Left 5/12/2021    WITH REVISION [facility] ARTHROPLASTY AND MINI TIGHTROPE APPLICATION APPLICATION EXTENSOR TENOSYNOVECTOMY AND FCR TENOTOMY  ; PARTIAL TRAPEZOIDECTOMY performed by Sara Luque MD at [facility] WRIST ARTHROSCOPY Left 5/12/2021    LEFT WRIST ARTHROSCOPY performed by Sara Luque MD at [facility] OR       There is not family history of neuromuscular conditions. ROS: There has been no associated vision change, hearing change, speech abnormality, swallowing abnormality, or bowel or bladder dysfunction. Physical Exam: General: The patient is in no apparent distress. Height 5' 9\" (1.753 m), weight 168 lb (76.2 kg). MSK: There is no joint effusion, deformity, instability, swelling, erythema or warmth. AROM is full in the spine and extremities. Tender left first [facility]. Left thumb and wrist joint motion is painful. Left wrist is tender. +Tinel left wrist. Neurologic:  Diminished light touch left first dorsal web space,  is weak on left, otherwise no focal sensorimotor deficit. Reflexes 2+ and symmetric. Gait is normal.    Impression:     1. Radial neuritis, left    2. Carpal tunnel syndrome of left wrist        Plan:   · EMG is indicated to evaluate the above diagnosis. Orders Placed This Encounter   Procedures    SC NEEDLE EMG EA EXTREMTY W/PARASPINL AREA COMPLETE    SC MOTOR &/SENS 9-10 NRV CNDJ PRECONF ELTRODE LIMB     · EMG was done today and showed a very mild radial sensory neuropathy, mild left carpal tunnel syndrome, mild left cubital tunnel syndrome. The patient was educated about the diagnosis and the prognosis. Patient educated to avoid leaning on elbow and to avoid repetitive elbow flexion/extension.    · Elbow splint at h.s.    · Recommend neutral wrist splints at h.s., OT and/or carpal tunnel injection and if no improvement after 4-6 weeks of conservative treatments consider orthopedic surgery evaluation. Recommend repeating the EMG in 1 year if symptoms persist.    · Advised patient to follow up with referring provider. Thank you for allowing me to participate in the care of your patient.       Sincerely,     Pedro Hoang, DO

## 2021-10-24 DIAGNOSIS — M18.12 ARTHRITIS OF CARPOMETACARPAL (CMC) JOINT OF LEFT THUMB: ICD-10-CM

## 2021-10-25 RX ORDER — AMITRIPTYLINE HYDROCHLORIDE 10 MG/1
TABLET, FILM COATED ORAL
Qty: 30 TABLET | Refills: 0 | Status: SHIPPED
Start: 2021-10-25 | End: 2022-06-06

## 2021-10-28 ENCOUNTER — OFFICE VISIT (OUTPATIENT)
Dept: ORTHOPEDIC SURGERY | Age: 71
End: 2021-10-28
Payer: COMMERCIAL

## 2021-10-28 VITALS — HEIGHT: 69 IN | BODY MASS INDEX: 24.88 KG/M2 | RESPIRATION RATE: 20 BRPM | WEIGHT: 168 LBS

## 2021-10-28 DIAGNOSIS — M19.042 DEGENERATIVE ARTHRITIS OF METACARPOPHALANGEAL JOINT OF LEFT THUMB: Primary | ICD-10-CM

## 2021-10-28 PROCEDURE — 20600 DRAIN/INJ JOINT/BURSA W/O US: CPT | Performed by: PHYSICIAN ASSISTANT

## 2021-10-28 RX ORDER — BETAMETHASONE SODIUM PHOSPHATE AND BETAMETHASONE ACETATE 3; 3 MG/ML; MG/ML
3 INJECTION, SUSPENSION INTRA-ARTICULAR; INTRALESIONAL; INTRAMUSCULAR; SOFT TISSUE ONCE
Status: COMPLETED | OUTPATIENT
Start: 2021-10-28 | End: 2021-10-28

## 2021-10-28 RX ADMIN — BETAMETHASONE SODIUM PHOSPHATE AND BETAMETHASONE ACETATE 3 MG: 3; 3 INJECTION, SUSPENSION INTRA-ARTICULAR; INTRALESIONAL; INTRAMUSCULAR; SOFT TISSUE at 09:30

## 2021-10-28 NOTE — PROGRESS NOTES
Chief Complaint   Patient presents with    Finger Pain     left thumb MCP joint arthritis, pt requesting injection          Denise Mccall is a 65y.o. year old  who presents for follow up of left thumb 5 months out. He relates that his thumb is still quite bothersome to him. He is having paresthesias and allodynia in the radial aspect of the thumb. In addition reports numbness into the index finger as well as the thumb. I did review his therapy notes. He appears to be plateauing in therapy secondary to continued hypersensitivity in the left hand. He reports that he is able to work with the current restrictions. He is frustrated with his lack of progress. Patient presents for an approved injection to the left thumb MCP joint. Patient states there has been no changes in his symptoms. There is been no new injuries. No new complaints.       Past Medical History:   Diagnosis Date    Cancer [facility])     prostate just recently DX July 2018    History of blood transfusion     1985 after \"ulcer surgery\"    Hyperlipidemia     Hypertension     Left wrist pain     Pain     low back, sacral     Past Surgical History:   Procedure Laterality Date    CATARACT REMOVAL WITH IMPLANT Right 07/31/2018    CATARACT REMOVAL WITH IMPLANT Left 07/02/2019    CYST REMOVAL      lt. axilla    HAND SURGERY      HEMORRHOID SURGERY      HERNIA REPAIR      INTRACAPSULAR CATARACT EXTRACTION Left 7/2/2019    LEFT EYE CATARACT EMULSIFICATION IOL IMPLANT - COMPLEX performed by Davis Toscano MD at [facility] Bilateral 08/17/2017    bilateral sacroiliac injection #1    NERVE BLOCK Bilateral 10/25/2017    Bilateral lumbar transforaminal epidural steroid injection    NERVE BLOCK Bilateral 11/02/2017    lumbar transforaminal epidural steriod injection #2    OTHER SURGICAL HISTORY  07-13-12    excision of cyst right 5th toe    RI XCAPSL CTRC RMVL INSJ IO LENS PROSTH W/O ECP Right 7/31/2018    CATARACT EXTRACTION WITH IOL RIGHT EYE performed by Davis [facility]oscano MD at [facility]      ulcer repair    WRIST ARTHROPLASTY Left 5/12/2021    WITH REVISION [employer] ARTHROPLASTY AND MINI TIGHTROPE APPLICATION APPLICATION EXTENSOR TENOSYNOVECTOMY AND FCR TENOTOMY  ; PARTIAL TRAPEZOIDECTOMY performed by Oral Peña MD at [facility] WRIST ARTHROSCOPY Left 5/12/2021    LEFT WRIST ARTHROSCOPY performed by Oral Peña MD at [facility] OR       Current Outpatient Medications:     amitriptyline (ELAVIL) 10 MG tablet, TAKE 1 TABLET BY MOUTH EVERY DAY AT NIGHT, Disp: 30 tablet, Rfl: 0    pantoprazole (PROTONIX) 40 MG tablet, TAKE 1 TABLET BY MOUTH EVERY DAY, Disp: , Rfl:     simvastatin (ZOCOR) 20 MG tablet, TAKE 1 TABLET BY MOUTH EVERY DAY, Disp: , Rfl:     diclofenac sodium (VOLTAREN) 1 % GEL, Apply 2 g topically 4 times daily, Disp: 2 Tube, Rfl: 1    amLODIPine (NORVASC) 10 MG tablet, Take 10 mg by mouth daily, Disp: , Rfl:     gabapentin (NEURONTIN) 100 MG capsule, Take 3 capsules by mouth 3 times daily for 30 days.  Intended supply: 30 days, Disp: 270 capsule, Rfl: 0  Allergies   Allergen Reactions    Codeine Nausea And Vomiting and Other (See Comments)     Dizziness, light headed    Motrin [Ibuprofen Micronized] Nausea And Vomiting     GI upset     Social History     Socioeconomic History    Marital status:      Spouse name: Not on file    Number of children: Not on file    Years of education: Not on file    Highest education level: Not on file   Occupational History    Not on file   Tobacco Use    Smoking status: Never Smoker    Smokeless tobacco: Never Used   Vaping Use    Vaping Use: Never used   Substance and Sexual Activity    Alcohol use: No    Drug use: No    Sexual activity: Not on file   Other Topics Concern    Not on file   Social History Narrative    Not on file     Social Determinants of Health     Financial Resource Strain:     Difficulty of Paying Living Expenses:    Food Insecurity:     Worried About Running Out of Food in the Last Year:     [address] in the Last Year:    Transportation Needs:     Lack of Transportation (Medical):  Lack of Transportation (Non-Medical):    Physical Activity:     Days of Exercise per Week:     Minutes of Exercise per Session:    Stress:     Feeling of Stress :    Social Connections:     Frequency of Communication with Friends and Family:     Frequency of Social Gatherings with Friends and Family:     Attends [employer] Services:     Active Member of Clubs or Organizations:     Attends Club or Organization Meetings:     Marital Status:    Intimate Partner Violence:     Fear of Current or Ex-Partner:     Emotionally Abused:     Physically Abused:     Sexually Abused:      Family History   Problem Relation Age of Onset    Cancer Mother     Cancer Father        Skin: (-) rash,(-) psoriasis,(-) eczema, (-)skin cancer. Musculoskeletal: Left thumb pain  Neurologic: (-) epilepsy, (-)seizures,(-) brain tumor,(-) TIA, (-)stroke, (-)headaches, (-)Parkinson disease,(-) memory loss, (-) LOC. Cardiovascular: (-) Chest pain, (-) swelling in legs/feet, (-) SOB, (-) cramping in legs/feet with walking. SUBJECTIVE:      Constitutional:    The patient is alert and oriented x 3, appears to be stated age and in no distress. Left upper extremity: Nontender of the shoulder and elbow region. Negative Tinel's of the cubital tunnel. Hypersensitivity over the radial sensory nerve just proximal to his dorsal scar. Positive Tinel's and Candido's maneuver over the median nerve with radiation to the thumb and index fingers. Negative CMC grind test.  Remains with sensitivity and tenderness over his MCP joint of the thumb with limited range of motion. Positive MCP joint grind test.  Negative instability. Opposition is limited to the tip of the little finger with pain over the dorsal aspect of the thumb.   Radial, ulnar, median

## 2021-11-10 DIAGNOSIS — M19.042 DEGENERATIVE ARTHRITIS OF METACARPOPHALANGEAL JOINT OF LEFT THUMB: Primary | ICD-10-CM

## 2021-11-10 RX ORDER — METHYLPREDNISOLONE 4 MG/1
TABLET ORAL
Qty: 1 KIT | Refills: 0 | Status: SHIPPED | OUTPATIENT
Start: 2021-11-10 | End: 2021-11-16

## 2021-12-13 ENCOUNTER — OFFICE VISIT (OUTPATIENT)
Dept: ORTHOPEDIC SURGERY | Age: 71
End: 2021-12-13
Payer: COMMERCIAL

## 2021-12-13 VITALS — RESPIRATION RATE: 22 BRPM | HEIGHT: 69 IN | WEIGHT: 168 LBS | BODY MASS INDEX: 24.88 KG/M2

## 2021-12-13 DIAGNOSIS — M77.8 LEFT WRIST TENDINITIS: Primary | ICD-10-CM

## 2021-12-13 DIAGNOSIS — G56.32 RADIAL NEURITIS, LEFT: ICD-10-CM

## 2021-12-13 DIAGNOSIS — G56.22 ULNAR NEUROPATHY AT ELBOW, LEFT: ICD-10-CM

## 2021-12-13 DIAGNOSIS — G56.02 CARPAL TUNNEL SYNDROME OF LEFT WRIST: ICD-10-CM

## 2021-12-13 PROCEDURE — 99214 OFFICE O/P EST MOD 30 MIN: CPT | Performed by: ORTHOPAEDIC SURGERY

## 2021-12-13 NOTE — PROGRESS NOTES
Chief Complaint   Patient presents with    Finger Pain     left thumb pain, numbness, and tingling          Maximus Nicolás is a 66y.o. year old  who presents for follow up of left thumb 7 months out. He relates that his thumb is still quite bothersome to him. He is having paresthesias and pain to the dorsal aspect of the thumb. In addition reports numbness into the index finger as well as the thumb. Continues to be plateauing in therapy secondary to continued hypersensitivity in the left hand. He reports that he is able to work with the current restrictions. He is frustrated with his lack of progress. Patient presents after EMG testing which demonstrated polyneuropathies of the median, ulnar and radial nerves. He is still having difficulties with most activities to the left hand secondary to the multiple hypersensitivities. Patient had an EMG/NCS dated 10/21/21 by Dr. 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 Patient also with significant cubital tunnel syndrome and carpal tunnel syndrome.       Past Medical History:   Diagnosis Date    Cancer [facility])     prostate just recently DX July 2018    History of blood transfusion     1985 after \"ulcer surgery\"    Hyperlipidemia     Hypertension     Left wrist pain     Pain     low back, sacral     Past Surgical History:   Procedure Laterality Date    CATARACT REMOVAL WITH IMPLANT Right 07/31/2018    CATARACT REMOVAL WITH IMPLANT Left 07/02/2019    CYST REMOVAL      lt. axilla    HAND SURGERY      HEMORRHOID SURGERY      HERNIA REPAIR      INTRACAPSULAR CATARACT EXTRACTION Left 7/2/2019    LEFT EYE CATARACT EMULSIFICATION IOL IMPLANT - COMPLEX performed by [place] RIOJAS MD at [facility] Bilateral 08/17/2017    bilateral sacroiliac injection #1    NERVE BLOCK Bilateral 10/25/2017    Bilateral lumbar transforaminal epidural steroid injection    NERVE BLOCK Bilateral 11/02/2017    lumbar transforaminal epidural steriod injection #2    OTHER SURGICAL HISTORY  07-13-12    excision of cyst right 5th toe    CO XCAPSL CTRC RMVL INSJ IO LENS PROSTH W/O ECP Right 7/31/2018    CATARACT EXTRACTION WITH IOL RIGHT EYE performed by Rae Dubois MD at [facility]      ulcer repair    WRIST ARTHROPLASTY Left 5/12/2021    WITH REVISION [facility] ARTHROPLASTY AND MINI TIGHTROPE APPLICATION APPLICATION EXTENSOR TENOSYNOVECTOMY AND FCR TENOTOMY  ; PARTIAL TRAPEZOIDECTOMY performed by Kimmy Barboza MD at [place] [address] WRIST ARTHROSCOPY Left 5/12/2021    LEFT WRIST ARTHROSCOPY performed by Kimmy Barboza MD at [facility] OR       Current Outpatient Medications:     amitriptyline (ELAVIL) 10 MG tablet, TAKE 1 TABLET BY MOUTH EVERY DAY AT NIGHT, Disp: 30 tablet, Rfl: 0    pantoprazole (PROTONIX) 40 MG tablet, TAKE 1 TABLET BY MOUTH EVERY DAY, Disp: , Rfl:     simvastatin (ZOCOR) 20 MG tablet, TAKE 1 TABLET BY MOUTH EVERY DAY, Disp: , Rfl:     diclofenac sodium (VOLTAREN) 1 % GEL, Apply 2 g topically 4 times daily, Disp: 2 Tube, Rfl: 1    amLODIPine (NORVASC) 10 MG tablet, Take 10 mg by mouth daily, Disp: , Rfl:     gabapentin (NEURONTIN) 100 MG capsule, Take 3 capsules by mouth 3 times daily for 30 days.  Intended supply: 30 days, Disp: 270 capsule, Rfl: 0  Allergies   Allergen Reactions    Codeine Nausea And Vomiting and Other (See Comments)     Dizziness, light headed    Motrin [Ibuprofen Micronized] Nausea And Vomiting     GI upset     Social History     Socioeconomic History    Marital status:      Spouse name: Not on file    Number of children: Not on file    Years of education: Not on file    Highest education level: Not on file   Occupational History    Not on file   Tobacco Use    Smoking status: Never Smoker    Smokeless tobacco: Never Used   Vaping Use    Vaping Use: Never used   Substance and Sexual Activity    Alcohol use: No    Drug use: No    Sexual activity: Not on file   Other Topics Concern    Not on file   Social History Narrative    Not on file     Social Determinants of Health     Financial Resource Strain:     Difficulty of Paying Living Expenses: Not on file   Food Insecurity:     Worried About Running Out of Food in the Last Year: Not on file    Roger of Food in the Last Year: Not on file   Transportation Needs:     Lack of Transportation (Medical): Not on file    Lack of Transportation (Non-Medical): Not on file   Physical Activity:     Days of Exercise per Week: Not on file    Minutes of Exercise per Session: Not on file   Stress:     Feeling of Stress : Not on file   Social Connections:     Frequency of Communication with Friends and Family: Not on file    Frequency of Social Gatherings with Friends and Family: Not on file    Attends Mormon Services: Not on file    Active Member of [address] [place] [address] or Organizations: Not on file    Attends Club or Organization Meetings: Not on file    Marital Status: Not on file   Intimate Partner Violence:     Fear of Current or Ex-Partner: Not on file    Emotionally Abused: Not on file    Physically Abused: Not on file    Sexually Abused: Not on file   Housing Stability:     Unable to Pay for Housing in the Last Year: Not on file    Number of [place] in the Last Year: Not on file    Unstable Housing in the Last Year: Not on file     Family History   Problem Relation Age of Onset    Cancer Mother     Cancer Father        Skin: (-) rash,(-) psoriasis,(-) eczema, (-)skin cancer.    Musculoskeletal: Left thumb pain  Neurologic: (-) epilepsy, (-)seizures,(-) brain tumor,(-) TIA, (-)stroke, (-)headaches, (-)Parkinson disease,(-) memory loss, (-) LOC.  Cardiovascular: (-) Chest pain, (-) swelling in legs/feet, (-) SOB, (-) cramping in legs/feet with walking. SUBJECTIVE:      Constitutional:    The patient is alert and oriented x 3, appears to be stated age and in no distress. HEENT:NCAT, EOMI  RESP:CTS  CV:RRR  Abd: soft,nttp    Left upper extremity: Nontender of the shoulder. Positive Tinel's of the cubital tunnel. He does demonstrate sensitivity and positive Tinel's over the radial nerve at the elbow and proximal forearm. He also has hypersensitivity over the radial sensory nerve just proximal to his dorsal wrist scar. Positive tenderness and sensitivity of the first extensor compartment. Positive Tinel's and Candido's maneuver over the median nerve with radiation to the thumb and index fingers. Negative CMC grind test.  Negative instability. Opposition is limited to the tip of the little finger with pain over the dorsal aspect of the thumb. Wrist extension limited secondary to radial sided wrist pain. Radial, ulnar, median nerves are grossly intact. Diminished sensation in the radial sensory nerve ulnar to his dorsal scar. Brisk capillary refill of digits. Full index through small fingers flexion extension. Negative atrophy. Impression:   7 months postop left thumb revision arthroplasty with continued pain particular in the radial sensory nerve distribution  Left thumb MCP joint arthritis  Left dorsal sensory radial neuritis  left carpal tunnel syndrome  Left cubital tunnel syndrome   HTN    Plan:     Reviewed EMG findings as well as clinical finding with the patient today. His pain and limitations to improvement in therapy are multifactorial with evidence of radial dorsal sensory neuritis as well as carpal tunnel and significant cubital tunnel. We discussed all treatment options, non-operative and operative with the patient today. He has fail multiple injections in the past and would like his symptoms taken care of.  We recommend a neurolysis with nerve wrap of the radial dorsal sensory nerve as well as release of the carpal tunnel and cubital tunnel. We will have his follow up in the post-operative period. I have seen and evaluated the patient and agree with the above assessment and plan on today's visit. I have performed the key components of the history and physical examination with significant findings of persistent pain with numbness and tingling in the radial sensory nerve distribution as well as positive findings of cubital tunnel carpal tunnel syndrome. Discussed difficulty of the radial nerve symptoms as her nonlocalizable on EMG nerve conduction study. However he does have sensitivity about the wrist and his previous surgical site and the probable location is at the wrist region. Discussed radial sensory nerve neurolysis in addition to possible revision extensor compartment release of the first extensor compartment. At the same setting he does have significant findings of cubital carpal tunnel which would be addressed at the same setting. Postoperative course were explained. Lastly discussed salvage with radial sensory nerve neurectomy if needed. However this would result in permanent numbness in the radial sensory nerve. .. I concur with the findings and plan as documented.     Tequila White MD  12/13/2021

## 2022-06-03 ENCOUNTER — PREP FOR PROCEDURE (OUTPATIENT)
Dept: ORTHOPEDIC SURGERY | Age: 72
End: 2022-06-03

## 2022-06-03 RX ORDER — SODIUM CHLORIDE 0.9 % (FLUSH) 0.9 %
5-40 SYRINGE (ML) INJECTION PRN
Status: CANCELLED | OUTPATIENT
Start: 2022-06-03

## 2022-06-03 RX ORDER — SODIUM CHLORIDE 9 MG/ML
INJECTION, SOLUTION INTRAVENOUS PRN
Status: CANCELLED | OUTPATIENT
Start: 2022-06-03

## 2022-06-03 RX ORDER — SODIUM CHLORIDE 9 MG/ML
INJECTION, SOLUTION INTRAVENOUS CONTINUOUS
Status: CANCELLED | OUTPATIENT
Start: 2022-06-03

## 2022-06-03 RX ORDER — SODIUM CHLORIDE 0.9 % (FLUSH) 0.9 %
5-40 SYRINGE (ML) INJECTION EVERY 12 HOURS SCHEDULED
Status: CANCELLED | OUTPATIENT
Start: 2022-06-03

## 2022-06-06 RX ORDER — METOPROLOL SUCCINATE 25 MG/1
1 TABLET, EXTENDED RELEASE ORAL DAILY
COMMUNITY

## 2022-06-06 NOTE — PROGRESS NOTES
[facility] PRE-ADMISSION TESTING INSTRUCTIONS    The Preadmission Testing patient is instructed accordingly using the following criteria (check applicable):    ARRIVAL INSTRUCTIONS:  [x] Parking the day of Surgery is located in the Main Entrance lot. Upon entering the door, make an immediate right to the surgery reception desk    [x] Bring photo ID and insurance card    [] Bring in a copy of Living will or Durable Power of  papers. [x] Please be sure to arrange transportation to and from the hospital    [x] Please arrange for someone to be with you the remainder of the day due to having anesthesia      GENERAL INSTRUCTIONS:    [x] Nothing by mouth after midnight, including gum, candy, mints or water    [x] You may brush your teeth, but do not swallow any water    [x] Take medications as instructed with 1-2 oz of water    [] Stop herbal supplements and vitamins 5 days prior to procedure    [x] Follow preop dosing of blood thinners per physician instructions    [] Do not take insulin or oral diabetic medications    [] If diabetic and have low blood sugar or feel symptomatic, take 1-2oz apple juice or glucose tablets    [] Bring inhalers day of surgery    [] Bring C-PAP/ Bi-Pap day of surgery    [] Bring urine specimen day of surgery    [x] Antibacterial Soap shower or bath AM of Surgery, no lotion, powders or creams to surgical site    [] Follow bowel prep as instructed per surgeon    [x] No tobacco products within 24 hours of surgery     [x] No alcohol or illegal drug use within 24 hours of surgery.     [x] Jewelry, body piercing's, eyeglasses, contact lenses and dentures are not permitted into surgery (bring cases)      [] Please do not wear any nail polish or make up on the day of surgery    [] If not already done, you can expect a call from registration    [x] If surgeon requests a time change you will be notified the day prior to surgery    [x] If you receive a survey after surgery we would greatly appreciate your comments    [] Parent/guardian of a minor must accompany their child and remain on the premises  the entire time they are under our care     [] Pediatric patients may bring favorite toy, blanket or comfort item with them    [] A caregiver or family member must remain with the patient during their stay if they are mentally handicapped, have dementia, disoriented or unable to use a call light or would be a safety concern if left unattended    [x] Please notify surgeon if you develop any illness between now and time of surgery (cold, cough, sore throat, fever, nausea, vomiting) or any signs of infections  including skin, wounds, and dental.    [] Other instructions    EDUCATIONAL MATERIALS PROVIDED:    [] PAT Preoperative Education Packet/Booklet     [] Medication List    [] Fluoroscopy Information Pamphlet    [] Transfusion bracelet applied with instructions    [] Joint replacement video reviewed    [] Shower with antibacterial soap and use CHG wipes provided the evening before surgery as instructed

## 2022-06-06 NOTE — PROGRESS NOTES
EKG DONE 5/12/21 DONE PRE-OP FOR SURGERY IS SINUS BRADYCARDIA CANNOT R/O ANTERIOR INFARCT. HISTORY AND EKG FROM 5/12/21. NO FURTHER WORKUP NEEDED. ML HAS NO HISTORY OF CHEST PAIN OR SOB . HE ONLY SEES DR. MUNROE PCP

## 2022-06-07 ENCOUNTER — ANESTHESIA EVENT (OUTPATIENT)
Dept: OPERATING ROOM | Age: 72
End: 2022-06-07
Payer: COMMERCIAL

## 2022-06-08 ENCOUNTER — ANESTHESIA (OUTPATIENT)
Dept: OPERATING ROOM | Age: 72
End: 2022-06-08
Payer: COMMERCIAL

## 2022-06-08 ENCOUNTER — HOSPITAL ENCOUNTER (OUTPATIENT)
Age: 72
Setting detail: OUTPATIENT SURGERY
Discharge: HOME | End: 2022-06-08
Attending: ORTHOPAEDIC SURGERY | Admitting: ORTHOPAEDIC SURGERY
Payer: COMMERCIAL

## 2022-06-08 VITALS
SYSTOLIC BLOOD PRESSURE: 148 MMHG | RESPIRATION RATE: 18 BRPM | HEIGHT: 69 IN | DIASTOLIC BLOOD PRESSURE: 80 MMHG | WEIGHT: 167 LBS | BODY MASS INDEX: 24.73 KG/M2 | OXYGEN SATURATION: 97 % | TEMPERATURE: 98.2 F | HEART RATE: 61 BPM

## 2022-06-08 DIAGNOSIS — M65.4 TENOSYNOVITIS, DE QUERVAIN: ICD-10-CM

## 2022-06-08 DIAGNOSIS — Z01.818 PRE-OP EXAM: Primary | ICD-10-CM

## 2022-06-08 DIAGNOSIS — G56.22 ULNAR NEUROPATHY AT ELBOW, LEFT: ICD-10-CM

## 2022-06-08 DIAGNOSIS — G56.02 LEFT CARPAL TUNNEL SYNDROME: ICD-10-CM

## 2022-06-08 LAB
EKG ATRIAL RATE: 57 BPM
EKG P-R INTERVAL: 162 MS
EKG Q-T INTERVAL: 376 MS
EKG QRS DURATION: 76 MS
EKG QTC CALCULATION (BAZETT): 365 MS
EKG R AXIS: 50 DEGREES
EKG T AXIS: 157 DEGREES
EKG VENTRICULAR RATE: 57 BPM

## 2022-06-08 PROCEDURE — 88304 TISSUE EXAM BY PATHOLOGIST: CPT

## 2022-06-08 PROCEDURE — 3600000012 HC SURGERY LEVEL 2 ADDTL 15MIN: Performed by: ORTHOPAEDIC SURGERY

## 2022-06-08 PROCEDURE — C1713 ANCHOR/SCREW BN/BN,TIS/BN: HCPCS | Performed by: ORTHOPAEDIC SURGERY

## 2022-06-08 PROCEDURE — 6360000002 HC RX W HCPCS: Performed by: NURSE ANESTHETIST, CERTIFIED REGISTERED

## 2022-06-08 PROCEDURE — 7100000010 HC PHASE II RECOVERY - FIRST 15 MIN: Performed by: ORTHOPAEDIC SURGERY

## 2022-06-08 PROCEDURE — 64718 REVISE ULNAR NERVE AT ELBOW: CPT | Performed by: ORTHOPAEDIC SURGERY

## 2022-06-08 PROCEDURE — 25000 INCISION OF TENDON SHEATH: CPT | Performed by: ORTHOPAEDIC SURGERY

## 2022-06-08 PROCEDURE — 64784 REMOVE NERVE LESION: CPT | Performed by: ORTHOPAEDIC SURGERY

## 2022-06-08 PROCEDURE — 2580000003 HC RX 258: Performed by: NURSE ANESTHETIST, CERTIFIED REGISTERED

## 2022-06-08 PROCEDURE — 7100000011 HC PHASE II RECOVERY - ADDTL 15 MIN: Performed by: ORTHOPAEDIC SURGERY

## 2022-06-08 PROCEDURE — 7100000000 HC PACU RECOVERY - FIRST 15 MIN: Performed by: ORTHOPAEDIC SURGERY

## 2022-06-08 PROCEDURE — 2500000003 HC RX 250 WO HCPCS: Performed by: ORTHOPAEDIC SURGERY

## 2022-06-08 PROCEDURE — 6360000002 HC RX W HCPCS: Performed by: PHYSICIAN ASSISTANT

## 2022-06-08 PROCEDURE — 3700000001 HC ADD 15 MINUTES (ANESTHESIA): Performed by: ORTHOPAEDIC SURGERY

## 2022-06-08 PROCEDURE — 2500000003 HC RX 250 WO HCPCS: Performed by: NURSE ANESTHETIST, CERTIFIED REGISTERED

## 2022-06-08 PROCEDURE — 6360000002 HC RX W HCPCS

## 2022-06-08 PROCEDURE — 93005 ELECTROCARDIOGRAM TRACING: CPT

## 2022-06-08 PROCEDURE — 64708 REVISE ARM/LEG NERVE: CPT | Performed by: ORTHOPAEDIC SURGERY

## 2022-06-08 PROCEDURE — 3600000002 HC SURGERY LEVEL 2 BASE: Performed by: ORTHOPAEDIC SURGERY

## 2022-06-08 PROCEDURE — 3700000000 HC ANESTHESIA ATTENDED CARE: Performed by: ORTHOPAEDIC SURGERY

## 2022-06-08 PROCEDURE — 64721 CARPAL TUNNEL SURGERY: CPT | Performed by: ORTHOPAEDIC SURGERY

## 2022-06-08 PROCEDURE — 6360000002 HC RX W HCPCS: Performed by: ANESTHESIOLOGY

## 2022-06-08 PROCEDURE — 99999 PR OFFICE/OUTPT VISIT,PROCEDURE ONLY: CPT | Performed by: PHYSICIAN ASSISTANT

## 2022-06-08 PROCEDURE — 6370000000 HC RX 637 (ALT 250 FOR IP): Performed by: ANESTHESIOLOGY

## 2022-06-08 PROCEDURE — 2709999900 HC NON-CHARGEABLE SUPPLY: Performed by: ORTHOPAEDIC SURGERY

## 2022-06-08 PROCEDURE — 7100000001 HC PACU RECOVERY - ADDTL 15 MIN: Performed by: ORTHOPAEDIC SURGERY

## 2022-06-08 DEVICE — THE AXOGUARD® NERVE CAP IS A SURGICAL IMPLANT THAT IS A TUBULAR DEVICE WITH ONE OPEN END, ONE SEALED END (CAP) AND INTERNAL CHANNELS DESIGNED TO PROVIDE PROTECTION FOR A PERIPHERAL NERVE END OR STUMP WHERE REPAIR IS UNATTAINABLE OR NOT DESIRED. THE DEVICE ISOLATES THE NERVE STUMP FROM THE SURROUNDING SOFT TISSUE BED BY PULLING THE NERVE INTO THE TUBE AND SUTURING THE NERVE WITHIN THE CAP. THE END OF THE CAP HAS A SUTURABLE TAB TO ALLOW THE SURGEON TO SUTURE THE DEVICE TO SURROUNDING TISSUE. AXOGUARD® NERVE CAP IS AN EXTRACELLULAR MATRIX (ECM) AND IS FULLY REMODELED DURING THE HEALING PROCESS. WHEN HYDRATED, AXOGUARD® NERVE CAP IS EASY TO HANDLE, SOFT, PLIABLE, NONFRIABLE AND POROUS. AXOGUARD® NERVE CAP IS FLEXIBLE AND PLIABLE TO ACCOMMODATE MOVEMENT OF THE JOINTS AND SURROUNDING SOFT TISSUES AND HAS SUFFICIENT MECHANICAL STRENGTH TO HOLD APPROPRIATELY SIZED NON-ABSORBABLE SUTURE. AXOGUARD® NERVE CAP IS PROVIDED STERILE, FOR SINGLE USE ONLY, AND IN A VARIETY OF SIZES TO MEET CLINICAL NEEDS.
Type: IMPLANTABLE DEVICE | Site: RADIUS | Status: FUNCTIONAL
Brand: AXOGUARD NERVECAP

## 2022-06-08 RX ORDER — SODIUM CHLORIDE 9 MG/ML
INJECTION, SOLUTION INTRAVENOUS CONTINUOUS
Status: DISCONTINUED | OUTPATIENT
Start: 2022-06-08 | End: 2022-06-08 | Stop reason: HOSPADM

## 2022-06-08 RX ORDER — ONDANSETRON 2 MG/ML
INJECTION INTRAMUSCULAR; INTRAVENOUS PRN
Status: DISCONTINUED | OUTPATIENT
Start: 2022-06-08 | End: 2022-06-08 | Stop reason: SDUPTHER

## 2022-06-08 RX ORDER — OXYCODONE HYDROCHLORIDE AND ACETAMINOPHEN 5; 325 MG/1; MG/1
1 TABLET ORAL EVERY 6 HOURS PRN
Qty: 28 TABLET | Refills: 0 | Status: SHIPPED | OUTPATIENT
Start: 2022-06-08 | End: 2022-06-16 | Stop reason: SDUPTHER

## 2022-06-08 RX ORDER — DEXAMETHASONE SODIUM PHOSPHATE 4 MG/ML
INJECTION, SOLUTION INTRA-ARTICULAR; INTRALESIONAL; INTRAMUSCULAR; INTRAVENOUS; SOFT TISSUE PRN
Status: DISCONTINUED | OUTPATIENT
Start: 2022-06-08 | End: 2022-06-08 | Stop reason: SDUPTHER

## 2022-06-08 RX ORDER — SODIUM CHLORIDE 0.9 % (FLUSH) 0.9 %
5-40 SYRINGE (ML) INJECTION EVERY 12 HOURS SCHEDULED
Status: DISCONTINUED | OUTPATIENT
Start: 2022-06-08 | End: 2022-06-08 | Stop reason: HOSPADM

## 2022-06-08 RX ORDER — SODIUM CHLORIDE, SODIUM LACTATE, POTASSIUM CHLORIDE, CALCIUM CHLORIDE 600; 310; 30; 20 MG/100ML; MG/100ML; MG/100ML; MG/100ML
INJECTION, SOLUTION INTRAVENOUS CONTINUOUS PRN
Status: DISCONTINUED | OUTPATIENT
Start: 2022-06-08 | End: 2022-06-08 | Stop reason: SDUPTHER

## 2022-06-08 RX ORDER — OXYCODONE HYDROCHLORIDE AND ACETAMINOPHEN 5; 325 MG/1; MG/1
1 TABLET ORAL EVERY 4 HOURS PRN
Status: DISCONTINUED | OUTPATIENT
Start: 2022-06-08 | End: 2022-06-08 | Stop reason: HOSPADM

## 2022-06-08 RX ORDER — SODIUM CHLORIDE 0.9 % (FLUSH) 0.9 %
5-40 SYRINGE (ML) INJECTION PRN
Status: DISCONTINUED | OUTPATIENT
Start: 2022-06-08 | End: 2022-06-08 | Stop reason: HOSPADM

## 2022-06-08 RX ORDER — FENTANYL CITRATE 50 UG/ML
25 INJECTION, SOLUTION INTRAMUSCULAR; INTRAVENOUS EVERY 5 MIN PRN
Status: DISCONTINUED | OUTPATIENT
Start: 2022-06-08 | End: 2022-06-08 | Stop reason: HOSPADM

## 2022-06-08 RX ORDER — SODIUM CHLORIDE 9 MG/ML
INJECTION, SOLUTION INTRAVENOUS PRN
Status: DISCONTINUED | OUTPATIENT
Start: 2022-06-08 | End: 2022-06-08 | Stop reason: HOSPADM

## 2022-06-08 RX ORDER — NEOSTIGMINE METHYLSULFATE 1 MG/ML
INJECTION, SOLUTION INTRAVENOUS PRN
Status: DISCONTINUED | OUTPATIENT
Start: 2022-06-08 | End: 2022-06-08 | Stop reason: SDUPTHER

## 2022-06-08 RX ORDER — LIDOCAINE HYDROCHLORIDE 10 MG/ML
INJECTION, SOLUTION EPIDURAL; INFILTRATION; INTRACAUDAL; PERINEURAL PRN
Status: DISCONTINUED | OUTPATIENT
Start: 2022-06-08 | End: 2022-06-08 | Stop reason: SDUPTHER

## 2022-06-08 RX ORDER — BUPIVACAINE HYDROCHLORIDE AND EPINEPHRINE 5; 5 MG/ML; UG/ML
INJECTION, SOLUTION EPIDURAL; INTRACAUDAL; PERINEURAL PRN
Status: DISCONTINUED | OUTPATIENT
Start: 2022-06-08 | End: 2022-06-08 | Stop reason: ALTCHOICE

## 2022-06-08 RX ORDER — PROPOFOL 10 MG/ML
INJECTION, EMULSION INTRAVENOUS PRN
Status: DISCONTINUED | OUTPATIENT
Start: 2022-06-08 | End: 2022-06-08 | Stop reason: SDUPTHER

## 2022-06-08 RX ORDER — GLYCOPYRROLATE 0.2 MG/ML
INJECTION INTRAMUSCULAR; INTRAVENOUS PRN
Status: DISCONTINUED | OUTPATIENT
Start: 2022-06-08 | End: 2022-06-08 | Stop reason: SDUPTHER

## 2022-06-08 RX ORDER — ROCURONIUM BROMIDE 10 MG/ML
INJECTION, SOLUTION INTRAVENOUS PRN
Status: DISCONTINUED | OUTPATIENT
Start: 2022-06-08 | End: 2022-06-08 | Stop reason: SDUPTHER

## 2022-06-08 RX ORDER — FENTANYL CITRATE 50 UG/ML
INJECTION, SOLUTION INTRAMUSCULAR; INTRAVENOUS PRN
Status: DISCONTINUED | OUTPATIENT
Start: 2022-06-08 | End: 2022-06-08 | Stop reason: SDUPTHER

## 2022-06-08 RX ORDER — SODIUM CHLORIDE 9 MG/ML
INJECTION, SOLUTION INTRAVENOUS CONTINUOUS PRN
Status: DISCONTINUED | OUTPATIENT
Start: 2022-06-08 | End: 2022-06-08 | Stop reason: SDUPTHER

## 2022-06-08 RX ADMIN — HYDROMORPHONE HYDROCHLORIDE 0.5 MG: 1 INJECTION, SOLUTION INTRAMUSCULAR; INTRAVENOUS; SUBCUTANEOUS at 10:20

## 2022-06-08 RX ADMIN — DEXAMETHASONE SODIUM PHOSPHATE 10 MG: 4 INJECTION, SOLUTION INTRAMUSCULAR; INTRAVENOUS at 08:10

## 2022-06-08 RX ADMIN — HYDROMORPHONE HYDROCHLORIDE 0.5 MG: 1 INJECTION, SOLUTION INTRAMUSCULAR; INTRAVENOUS; SUBCUTANEOUS at 10:12

## 2022-06-08 RX ADMIN — PROPOFOL 200 MG: 10 INJECTION, EMULSION INTRAVENOUS at 08:10

## 2022-06-08 RX ADMIN — HYDROMORPHONE HYDROCHLORIDE 0.5 MG: 1 INJECTION, SOLUTION INTRAMUSCULAR; INTRAVENOUS; SUBCUTANEOUS at 10:40

## 2022-06-08 RX ADMIN — Medication 2000 MG: at 08:13

## 2022-06-08 RX ADMIN — LIDOCAINE HYDROCHLORIDE 40 MG: 10 INJECTION, SOLUTION EPIDURAL; INFILTRATION; INTRACAUDAL; PERINEURAL at 08:10

## 2022-06-08 RX ADMIN — FENTANYL CITRATE 50 MCG: 50 INJECTION, SOLUTION INTRAMUSCULAR; INTRAVENOUS at 08:13

## 2022-06-08 RX ADMIN — FENTANYL CITRATE 25 MCG: 50 INJECTION, SOLUTION INTRAMUSCULAR; INTRAVENOUS at 09:42

## 2022-06-08 RX ADMIN — GLYCOPYRROLATE 0.5 MG: 0.2 INJECTION, SOLUTION INTRAMUSCULAR; INTRAVENOUS at 09:30

## 2022-06-08 RX ADMIN — SODIUM CHLORIDE, POTASSIUM CHLORIDE, SODIUM LACTATE AND CALCIUM CHLORIDE: 600; 310; 30; 20 INJECTION, SOLUTION INTRAVENOUS at 09:24

## 2022-06-08 RX ADMIN — ONDANSETRON 4 MG: 2 INJECTION INTRAMUSCULAR; INTRAVENOUS at 08:10

## 2022-06-08 RX ADMIN — HYDROMORPHONE HYDROCHLORIDE 0.5 MG: 1 INJECTION, SOLUTION INTRAMUSCULAR; INTRAVENOUS; SUBCUTANEOUS at 10:35

## 2022-06-08 RX ADMIN — FENTANYL CITRATE 25 MCG: 50 INJECTION, SOLUTION INTRAMUSCULAR; INTRAVENOUS at 09:48

## 2022-06-08 RX ADMIN — FENTANYL CITRATE 50 MCG: 50 INJECTION, SOLUTION INTRAMUSCULAR; INTRAVENOUS at 08:59

## 2022-06-08 RX ADMIN — FENTANYL CITRATE 50 MCG: 50 INJECTION, SOLUTION INTRAMUSCULAR; INTRAVENOUS at 08:10

## 2022-06-08 RX ADMIN — ROCURONIUM BROMIDE 25 MG: 10 INJECTION, SOLUTION INTRAVENOUS at 08:10

## 2022-06-08 RX ADMIN — SODIUM CHLORIDE: 9 INJECTION, SOLUTION INTRAVENOUS at 08:05

## 2022-06-08 RX ADMIN — Medication 3 MG: at 09:30

## 2022-06-08 RX ADMIN — PROPOFOL 100 MG: 10 INJECTION, EMULSION INTRAVENOUS at 09:37

## 2022-06-08 RX ADMIN — OXYCODONE AND ACETAMINOPHEN 1 TABLET: 5; 325 TABLET ORAL at 12:09

## 2022-06-08 ASSESSMENT — PAIN DESCRIPTION - ORIENTATION
ORIENTATION: LEFT

## 2022-06-08 ASSESSMENT — LIFESTYLE VARIABLES: SMOKING_STATUS: 0

## 2022-06-08 ASSESSMENT — PAIN DESCRIPTION - FREQUENCY
FREQUENCY: CONTINUOUS

## 2022-06-08 ASSESSMENT — PAIN DESCRIPTION - ONSET
ONSET: ON-GOING

## 2022-06-08 ASSESSMENT — PAIN DESCRIPTION - LOCATION
LOCATION: ARM
LOCATION: ARM
LOCATION: ARM;HAND
LOCATION: ARM

## 2022-06-08 ASSESSMENT — PAIN SCALES - GENERAL
PAINLEVEL_OUTOF10: 8
PAINLEVEL_OUTOF10: 6
PAINLEVEL_OUTOF10: 7
PAINLEVEL_OUTOF10: 6
PAINLEVEL_OUTOF10: 8
PAINLEVEL_OUTOF10: 7
PAINLEVEL_OUTOF10: 6

## 2022-06-08 ASSESSMENT — PAIN DESCRIPTION - PAIN TYPE
TYPE: ACUTE PAIN;SURGICAL PAIN
TYPE: SURGICAL PAIN
TYPE: ACUTE PAIN;SURGICAL PAIN
TYPE: SURGICAL PAIN
TYPE: SURGICAL PAIN;ACUTE PAIN
TYPE: ACUTE PAIN;SURGICAL PAIN

## 2022-06-08 ASSESSMENT — PAIN DESCRIPTION - DESCRIPTORS
DESCRIPTORS: ACHING;DISCOMFORT;SORE
DESCRIPTORS: ACHING;DISCOMFORT
DESCRIPTORS: DISCOMFORT
DESCRIPTORS: ACHING
DESCRIPTORS: DISCOMFORT
DESCRIPTORS: ACHING
DESCRIPTORS: DISCOMFORT;ACHING
DESCRIPTORS: ACHING;DISCOMFORT
DESCRIPTORS: DISCOMFORT;ACHING
DESCRIPTORS: ACHING

## 2022-06-08 ASSESSMENT — PAIN SCALES - WONG BAKER
WONGBAKER_NUMERICALRESPONSE: 0
WONGBAKER_NUMERICALRESPONSE: 6
WONGBAKER_NUMERICALRESPONSE: 2
WONGBAKER_NUMERICALRESPONSE: 4
WONGBAKER_NUMERICALRESPONSE: 6
WONGBAKER_NUMERICALRESPONSE: 4
WONGBAKER_NUMERICALRESPONSE: 0

## 2022-06-08 ASSESSMENT — PAIN - FUNCTIONAL ASSESSMENT
PAIN_FUNCTIONAL_ASSESSMENT: ACTIVITIES ARE NOT PREVENTED
PAIN_FUNCTIONAL_ASSESSMENT: ACTIVITIES ARE NOT PREVENTED
PAIN_FUNCTIONAL_ASSESSMENT: 0-10
PAIN_FUNCTIONAL_ASSESSMENT: ACTIVITIES ARE NOT PREVENTED

## 2022-06-08 NOTE — BRIEF OP NOTE
Brief Postoperative Note      Patient: Maxwell Ibrahim YOB: 1950  MRN: [id number]    Date of Procedure: 6/8/2022    Pre-Op Diagnosis: LEFT CARPAL TUNNEL SYNDROME, LEFT ULNAR NEUROPATHY AT THE ELBOW, LEFT RADIAL NEUROLYSIS    Post-Op Diagnosis: Same       Procedure(s):  LEFT CARPAL TUNNEL RELEASE  LEFT ULNAR NERVE DECOMPRESSION AT ELBOW WITH TRANSPOSITION, LEFT RADIAL SENSORY NEUROLYSIS AND NEURECTOMY WITH NERVE CAP, REVISION DE QUERVAIN'S RELEASE    Surgeon(s):  Fiorella Prado MD    Assistant:  Physician Assistant: FORTUNATO Mendes  Resident: Debbie Millard DO    Anesthesia: General    Estimated Blood Loss (mL): Minimal    Complications: None    Specimens:   ID Type Source Tests Collected by Time Destination   A : left radial nerve neuroma Tissue Tissue SURGICAL PATHOLOGY Fiorella Prado MD 6/8/2022 0911        Implants:  Implant Name Type Inv.  Item Serial No.  Lot No. LRB No. Used Action   CAP NERVE AXOGUARD 3MM X 15MM - [id number]  CAP NERVE AXOGUARD 3MM X 15MM  [employer]- [id number] Left 1 Implanted         Drains: * No LDAs found *    Findings: see op note    Electronically signed by FORTUNATO Mendes on 6/8/2022 at 9:54 AM

## 2022-06-08 NOTE — PROGRESS NOTES
Patient only had 1mg of dilaudid ordered and was still 8 /10 pain. Dr. Anton Neumann called and new orders for additional 1 mg of dilaudid given.

## 2022-06-08 NOTE — ANESTHESIA PRE PROCEDURE
Department of Anesthesiology  Preprocedure Note       Name:  Rita Paulino. Age:  66 y.o.  :  1950                                          MRN:  [id number]         Date:  2022      Surgeon: Marquis Fowler):  Kelsey Degroot MD    Procedure: LEFT CARPAL TUNNEL RELEASE, LEFT ULNAR NERVE DECOMPRESSION AT ELBOW, LEFT RADIAL SENSORY NEUROLYSIS WITH NERVE WRAP     Medications prior to admission:   Prior to Admission medications    Medication Sig Start Date End Date Taking? Authorizing Provider   oxyCODONE-acetaminophen (PERCOCET) 5-325 MG per tablet Take 1 tablet by mouth every 6 hours as needed for Pain for up to 7 days. Intended supply: 7 days. Take lowest dose possible to manage pain 6/8/22 6/15/22 Yes Debo Ford DO   metoprolol succinate (TOPROL XL) 25 MG extended release tablet Take 1 tablet by mouth daily    Historical Provider, MD   pantoprazole (PROTONIX) 40 MG tablet daily as needed  20   Historical Provider, MD   simvastatin (ZOCOR) 20 MG tablet Take by mouth daily  3/30/20   Historical Provider, MD   diclofenac sodium (VOLTAREN) 1 % GEL Apply 2 g topically 4 times daily 20   Kelsey Degroot MD   amLODIPine (NORVASC) 10 MG tablet Take 10 mg by mouth daily    Historical Provider, MD       Current medications:    Current Facility-Administered Medications   Medication Dose Route Frequency Provider Last Rate Last Admin    0.9 % sodium chloride infusion   IntraVENous Continuous FORTUNATO Emmanuel        0.9 % sodium chloride infusion   IntraVENous PRN FORTUNATO Emmanuel        ceFAZolin (ANCEF) 2000 mg in sterile water 20 mL IV syringe  2,000 mg IntraVENous On Call to [address]TUN[address]        sodium chloride flush 0.9 % injection 5-40 mL  5-40 mL IntraVENous 2 times per day FORTUNATO Emmanuel        sodium chloride flush 0.9 % injection 5-40 mL  5-40 mL IntraVENous PRN FORTUNATO Emmanuel           Allergies:     Allergies   Allergen Reactions    Codeine Nausea And Vomiting and Other (See Comments)     Dizziness, light headed    Motrin [Ibuprofen Micronized] Nausea And Vomiting     GI upset       Problem List:    Patient Active Problem List   Diagnosis Code    Embolism and thrombosis of superficial vein of right lower extremity I82.811    Sacroiliitis  M46.1    Chronic low back pain M54.50, G89.29    Lumbar facet arthropathy  M47.816    Somatic dysfunction of lumbosacral spine M99.09    Chronic pain syndrome G89.4    Disc displacement, lumbar M51.26    Neural foraminal stenosis of lumbar spine M48.061    Right cataract H26.9    Left cataract H26.9    Contusion of left hand S60.222A    Arthritis of carpometacarpal (CMC) joint of left thumb M18.12    Tenosynovitis, de Quervain M65.4       Past Medical History:        Diagnosis Date    Cancer ([facility] [address] 07/2018    prostate     History of blood transfusion     1985 after \"ulcer surgery\"    Hyperlipidemia     Hypertension     Left wrist pain     Pain     low back, sacral       Past Surgical History:        Procedure Laterality Date    CATARACT REMOVAL WITH IMPLANT Right 07/31/2018    CATARACT REMOVAL WITH IMPLANT Left 07/02/2019    CYST REMOVAL      lt. axilla    HAND SURGERY      HEMORRHOID SURGERY      HERNIA REPAIR      INTRACAPSULAR CATARACT EXTRACTION Left 7/2/2019    LEFT EYE CATARACT EMULSIFICATION IOL IMPLANT - COMPLEX performed by Tomás [facility]olomew [facility] at [facility] Bilateral 08/17/2017    bilateral sacroiliac injection #1    NERVE BLOCK Bilateral 10/25/2017    Bilateral lumbar transforaminal epidural steroid injection    NERVE BLOCK Bilateral 11/02/2017    lumbar transforaminal epidural steriod injection #2    OTHER SURGICAL HISTORY  07-13-12    excision of cyst right 5th toe    NC XCAPSL CTRC RMVL INSJ IO LENS PROSTH W/O ECP Right 7/31/2018    CATARACT EXTRACTION WITH IOL RIGHT EYE performed by [facility]w [facility] at [facility]      ulcer repair    WRIST ARTHROPLASTY Left 5/12/2021    WITH REVISION CMC ARTHROPLASTY AND MINI TIGHTROPE APPLICATION APPLICATION EXTENSOR TENOSYNOVECTOMY AND FCR TENOTOMY  ; PARTIAL TRAPEZOIDECTOMY performed by Karene Lundborg, MD at [place] [address] WRIST ARTHROSCOPY Left 5/12/2021    LEFT WRIST ARTHROSCOPY performed by Karene Lundborg, MD at [facility] OR       Social History:    Social History     Tobacco Use    Smoking status: Never Smoker    Smokeless tobacco: Never Used   Substance Use Topics    Alcohol use: No                                Counseling given: Not Answered      Vital Signs (Current):   Vitals:    06/06/22 1532 06/08/22 0627   BP:  136/74   Pulse:  63   Resp:  18   Temp:  98.1 °F (36.7 °C)   TempSrc:  Temporal   SpO2:  100%   Weight: 167 lb (75.8 kg) 167 lb (75.8 kg)   Height: 5' 9\" (1.753 m) 5' 9\" (1.753 m)                                              BP Readings from Last 3 Encounters:   06/08/22 136/74   05/12/21 (!) 151/81   05/12/21 (!) 114/59       NPO Status: Time of last liquid consumption: 2030                        Time of last solid consumption: 2030                        Date of last liquid consumption: 06/07/22                        Date of last solid food consumption: 06/07/22    BMI:   Wt Readings from Last 3 Encounters:   06/08/22 167 lb (75.8 kg)   12/13/21 168 lb (76.2 kg)   10/28/21 168 lb (76.2 kg)     Body mass index is 24.66 kg/m². CBC: No results found for: WBC, RBC, HGB, HCT, MCV, RDW, PLT    CMP: No results found for: NA, K, CL, CO2, BUN, CREATININE, GFRAA, AGRATIO, LABGLOM, GLUCOSE, GLU, PROT, CALCIUM, BILITOT, ALKPHOS, AST, ALT    POC Tests: No results for input(s): POCGLU, POCNA, POCK, POCCL, POCBUN, POCHEMO, POCHCT in the last 72 hours. Coags: No results found for: PROTIME, INR, APTT    HCG (If Applicable): No results found for: PREGTESTUR, PREGSERUM, HCG, HCGQUANT     ABGs: No results found for: PHART, PO2ART, CFO6PVR, CTO3MJC, BEART, G2VFYNRQ     Type & Screen (If Applicable):   No results found for: CHRISSY, [address]    Anesthesia Evaluation  Patient summary reviewed and Nursing notes reviewed no history of anesthetic complications:   Airway: Mallampati: II  TM distance: >3 FB   Neck ROM: full  Comment: Receding chin  Mouth opening: > = 3 FB   Dental: normal exam         Pulmonary:Negative Pulmonary ROS breath sounds clear to auscultation      (-) not a current smoker                           Cardiovascular:  Exercise tolerance: good (>4 METS),   (+) hypertension:, hyperlipidemia        Rhythm: regular  Rate: normal      Cleared by cardiology     Beta Blocker:  Dose within 24 Hrs        PE comment: Bradycardia  Faint HS   Neuro/Psych:   (+) neuromuscular disease:,             GI/Hepatic/Renal:   (+) PUD,          ROS comment: CA prostate. Endo/Other:    (+) : arthritis: OA., malignancy/cancer (prostate ). Abdominal:             Vascular: negative vascular ROS. Other Findings:             Anesthesia Plan      general     ASA 2       Induction: intravenous. MIPS: Postoperative opioids intended and Prophylactic antiemetics administered. Anesthetic plan and risks discussed with patient and spouse. Plan discussed with CRNA.               [address] Jarad Burk DO   6/8/2022

## 2022-06-08 NOTE — PROGRESS NOTES
Dr. Zambrano Liner at bedside. Patient still states 6/10 pain but sleepy. Patient not to get any more IV pain medicines, and will transfer to PACU 2. VSS.

## 2022-06-08 NOTE — H&P
Chief Complaint   Patient presents with    Finger Pain       left thumb pain, numbness, and tingling             Candy Arevalo is a 66y.o. year old  who presents for follow up of left thumb 7 months out. He relates that his thumb is still quite bothersome to him. He is having paresthesias and pain to the dorsal aspect of the thumb. In addition reports numbness into the index finger as well as the thumb. Continues to be plateauing in therapy secondary to continued hypersensitivity in the left hand. He reports that he is able to work with the current restrictions. He is frustrated with his lack of progress.     Patient presents after EMG testing which demonstrated polyneuropathies of the median, ulnar and radial nerves. He is still having difficulties with most activities to the left hand secondary to the multiple hypersensitivities.      Patient had an EMG/NCS dated 10/21/21 by Dr. Saldaña December        Left median nerve motor velocity: 50  Left ulnar nerve velocity: 38  Left median nerve sensory latency: 4.11    EMG portion of the exam demonstrates Decreased motor unit recruitment of the FDP of 4 and 5     Interpretation of EMG nerve conduction today demonstrates axonal injury of radial nerve with nonlocalizable location. Patient also with significant cubital tunnel syndrome and carpal tunnel syndrome.        Past Medical History        Past Medical History:   Diagnosis Date    Cancer [facility])       prostate just recently DX July 2018    History of blood transfusion       1985 after \"ulcer surgery\"    Hyperlipidemia      Hypertension      Left wrist pain      Pain       low back, sacral         Past Surgical History         Past Surgical History:   Procedure Laterality Date    CATARACT REMOVAL WITH IMPLANT Right 07/31/2018    CATARACT REMOVAL WITH IMPLANT Left 07/02/2019    CYST REMOVAL         lt.  axilla    HAND SURGERY        HEMORRHOID SURGERY        HERNIA REPAIR        INTRACAPSULAR CATARACT EXTRACTION Left 7/2/2019     LEFT EYE CATARACT EMULSIFICATION IOL IMPLANT - COMPLEX performed by Enrique Zapata MD at [address] [place] [address] Bilateral 08/17/2017     bilateral sacroiliac injection #1    NERVE BLOCK Bilateral 10/25/2017     Bilateral lumbar transforaminal epidural steroid injection    NERVE BLOCK Bilateral 11/02/2017     lumbar transforaminal epidural steriod injection #2    OTHER SURGICAL HISTORY   07-13-12     excision of cyst right 5th toe    LA XCAPSL CTRC RMVL INSJ IO LENS PROSTH W/O ECP Right 7/31/2018     CATARACT EXTRACTION WITH IOL RIGHT EYE performed by Enrique [facility]ata MD at [facility]         ulcer repair    WRIST ARTHROPLASTY Left 5/12/2021     WITH REVISION CMC ARTHROPLASTY AND MINI TIGHTROPE APPLICATION APPLICATION EXTENSOR TENOSYNOVECTOMY AND FCR TENOTOMY  ; PARTIAL TRAPEZOIDECTOMY performed by Donavon Seals MD at [place] [address] WRIST ARTHROSCOPY Left 5/12/2021     LEFT WRIST ARTHROSCOPY performed by Donavon Seals MD at [facility] OR           Current Medication      Current Outpatient Medications:     amitriptyline (ELAVIL) 10 MG tablet, TAKE 1 TABLET BY MOUTH EVERY DAY AT NIGHT, Disp: 30 tablet, Rfl: 0    pantoprazole (PROTONIX) 40 MG tablet, TAKE 1 TABLET BY MOUTH EVERY DAY, Disp: , Rfl:     simvastatin (ZOCOR) 20 MG tablet, TAKE 1 TABLET BY MOUTH EVERY DAY, Disp: , Rfl:     diclofenac sodium (VOLTAREN) 1 % GEL, Apply 2 g topically 4 times daily, Disp: 2 Tube, Rfl: 1    amLODIPine (NORVASC) 10 MG tablet, Take 10 mg by mouth daily, Disp: , Rfl:     gabapentin (NEURONTIN) 100 MG capsule, Take 3 capsules by mouth 3 times daily for 30 days.  Intended supply: 30 days, Disp: 270 capsule, Rfl: 0           Allergies   Allergen Reactions    Codeine Nausea And Vomiting and Other (See Comments)       Dizziness, light headed    Motrin [Ibuprofen Micronized] Nausea And Vomiting       GI upset      Social History               Socioeconomic History    Marital status:        Spouse name: Not on file    Number of children: Not on file    Years of education: Not on file    Highest education level: Not on file   Occupational History    Not on file   Tobacco Use    Smoking status: Never Smoker    Smokeless tobacco: Never Used   Vaping Use    Vaping Use: Never used   Substance and Sexual Activity    Alcohol use: No    Drug use: No    Sexual activity: Not on file   Other Topics Concern    Not on file   Social History Narrative    Not on file      Social Determinants of Health          Financial Resource Strain:     Difficulty of Paying Living Expenses: Not on file   Food Insecurity:     Worried About Running Out of Food in the Last Year: Not on file    Roger of Food in the Last Year: Not on file   Transportation Needs:     Lack of Transportation (Medical): Not on file    Lack of Transportation (Non-Medical):  Not on file   Physical Activity:     Days of Exercise per Week: Not on file    Minutes of Exercise per Session: Not on file   Stress:     Feeling of Stress : Not on file   Social Connections:     Frequency of Communication with Friends and Family: Not on file    Frequency of Social Gatherings with Friends and Family: Not on file    Attends Sabianist Services: Not on file    Active Member of [address] [place] [address] [employer] or Organizations: Not on file    Attends Club or Organization Meetings: Not on file    Marital Status: Not on file   Intimate Partner Violence:     Fear of Current or Ex-Partner: Not on file    Emotionally Abused: Not on file    Physically Abused: Not on file    Sexually Abused: Not on file   Housing Stability:     Unable to Pay for Housing in the Last Year: Not on file    Number of [place] in the Last Year: Not on file    Unstable Housing in the Last Year: Not on file         Family History         Family History   Problem Relation Age of Onset    Cancer Mother      Cancer Father              Skin: (-) rash,(-) psoriasis,(-) eczema, (-)skin cancer. Musculoskeletal: Left thumb pain  Neurologic: (-) epilepsy, (-)seizures,(-) brain tumor,(-) TIA, (-)stroke, (-)headaches, (-)Parkinson disease,(-) memory loss, (-) LOC. Cardiovascular: (-) Chest pain, (-) swelling in legs/feet, (-) SOB, (-) cramping in legs/feet with walking.     SUBJECTIVE:        Constitutional:     The patient is alert and oriented x 3, appears to be stated age and in no distress.     HEENT:NCAT, EOMI  RESP:CTS  CV:RRR  Abd: soft,nttp     Left upper extremity: Nontender of the shoulder. Positive Tinel's of the cubital tunnel. He does demonstrate sensitivity and positive Tinel's over the radial nerve at the elbow and proximal forearm. He also has hypersensitivity over the radial sensory nerve just proximal to his dorsal wrist scar. Positive tenderness and sensitivity of the first extensor compartment. Positive Tinel's and Candido's maneuver over the median nerve with radiation to the thumb and index fingers. Negative CMC grind test.  Negative instability. Opposition is limited to the tip of the little finger with pain over the dorsal aspect of the thumb. Wrist extension limited secondary to radial sided wrist pain. Radial, ulnar, median nerves are grossly intact. Diminished sensation in the radial sensory nerve ulnar to his dorsal scar. Brisk capillary refill of digits. Full index through small fingers flexion extension. Negative atrophy.        Impression:   7 months postop left thumb revision arthroplasty with continued pain particular in the radial sensory nerve distribution  Left thumb MCP joint arthritis  Left dorsal sensory radial neuritis  left carpal tunnel syndrome  Left cubital tunnel syndrome   HTN     Plan:      Reviewed EMG findings as well as clinical finding with the patient today.  His pain and limitations to improvement in therapy are multifactorial with evidence of radial dorsal sensory neuritis as well as carpal tunnel and significant cubital tunnel. We discussed all treatment options, non-operative and operative with the patient today. He has fail multiple injections in the past and would like his symptoms taken care of. We recommend a neurolysis with nerve wrap of the radial dorsal sensory nerve as well as release of the carpal tunnel and cubital tunnel. We will have his follow up in the post-operative period.     I have seen and evaluated the patient and agree with the above assessment and plan on today's visit. I have performed the key components of the history and physical examination with significant findings of persistent pain with numbness and tingling in the radial sensory nerve distribution as well as positive findings of cubital tunnel carpal tunnel syndrome. Discussed difficulty of the radial nerve symptoms as her nonlocalizable on EMG nerve conduction study. However he does have sensitivity about the wrist and his previous surgical site and the probable location is at the wrist region. Discussed radial sensory nerve neurolysis in addition to possible revision extensor compartment release of the first extensor compartment. At the same setting he does have significant findings of cubital carpal tunnel which would be addressed at the same setting. Postoperative course were explained. Lastly discussed salvage with radial sensory nerve neurectomy if needed. However this would result in permanent numbness in the radial sensory nerve. .. I concur with the findings and plan as documented.     Guerda French MD  12/13/2021    The patient was counseled at length about the risks of tico Covid-19 during their perioperative period and any recovery window from their procedure. The patient was made aware that tico Covid-19  may worsen their prognosis for recovering from their procedure  and lend to a higher morbidity and/or mortality risk.   All material risks, benefits, and reasonable alternatives including postponing the procedure were discussed. The patient does wish to proceed with the procedure at this time. History and Physical Update     Patient was seen and examined. Patient's history and physical was reviewed with the patient. There has been no significant interval changes.     Electronically signed by Connor Jimenez DO on 6/8/2022 at 6:12 AM

## 2022-06-08 NOTE — ANESTHESIA POSTPROCEDURE EVALUATION
Department of Anesthesiology  Postprocedure Note    Patient: Edyta Cuadra MRN: [id number]  YOB: 1950  Date of evaluation: 6/8/2022  Time:  10:00 AM     Procedure Summary     Date: 06/08/22 Room / Location: [address]    Anesthesia Start: 0800 Anesthesia Stop: 1000    Procedures:       LEFT CARPAL TUNNEL RELEASE (Left )      LEFT ULNAR NERVE DECOMPRESSION AT ELBOW WITH TRANSPOSITION, LEFT RADIAL SENSORY NEUROLYSIS AND NEURECTOMY WITH NERVE CAP, REVISION DE QUERVAIN'S RELEASE (Left ) Diagnosis:       Left carpal tunnel syndrome      Ulnar neuropathy at elbow, left      (LEFT CARPAL TUNNEL SYNDROME, LEFT ULNAR NEUROPATHY AT THE ELBOW, LEFT RADIAL NEUROLYSIS)    Surgeons: Danitza Lopez MD Responsible Provider: Liu Short DO    Anesthesia Type: general ASA Status: 2          Anesthesia Type: No value filed. Devi Phase I: Devi Score: 8    Devi Phase II:      Last vitals: Reviewed and per EMR flowsheets.        Anesthesia Post Evaluation    Patient location during evaluation: PACU  Patient participation: complete - patient participated  Level of consciousness: awake and alert and sleepy but conscious  Pain score: 0  Airway patency: patent  Nausea & Vomiting: no nausea and no vomiting  Complications: no  Cardiovascular status: blood pressure returned to baseline  Respiratory status: acceptable  Hydration status: euvolemic

## 2022-06-09 NOTE — OP NOTE
[address] [address]                                OPERATIVE REPORT    PATIENT NAME: La Montesinos                    :        1950  MED REC NO:   [id number]                            ROOM:  ACCOUNT NO:   [de-identified]                           ADMIT DATE: 2022  PROVIDER:     Jeet Ellis MD    DATE OF PROCEDURE:  2022    PREOPERATIVE DIAGNOSES:  1. Left carpal tunnel syndrome. 2.  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 3.  Left recurrent de Quervain's tenosynovitis and ulnar nerve neuritis. POSTOPERATIVE DIAGNOSES:  1. Left carpal tunnel syndrome. 2.  Left cubital tunnel syndrome. 3.  Left recurrent de Quervain's tenosynovitis. 4.  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 PROCEDURES PERFORMED:  1. Left carpal tunnel release. 2.  Left anterior subcutaneous ulnar nerve transposition with decompression at elbow. 3.  Left revision de Quervain's tenolysis. 4.  Left radial sensory nerve neurolysis. 5.  Left radial sensory nerve neuroma excision and application of AxoGen  nerve cap followed by placement of the nerve stump within the distal  radius and sewn into place with 8-0 nylon suture. SURGEON:  Jeet Ellis M.D. ANESTHESIA:  1. General.  2.  Local anesthetic by surgeon consisting of approximately 20 mL of  0.25% Marcaine with epinephrine. ASSISTANTS:  1.  Dr. Farheen Estrada, Orthopedic Surgery Resident  2. Vickie De Los Santos, physician assistant certified. She was present  throughout the procedure. Her assistance was used for preoperative  positioning, intraoperative retraction, closure, and dressing  application. Her assistance expedited the case and decreased the  surgical time. TOTAL TOURNIQUET TIME:  58 minutes at 250 mmHg of brachial tourniquet. SPECIMENS:  Neuroma was sent for pathology.     ESTIMATED BLOOD LOSS:  Minimal.    FINDINGS:  1.  Evidence of ulnar nerve compression through the cubital tunnel that  was adequately decompressed with anterior subcutaneous transposition of  the nerve with smooth excursion of the nerve with elbow flexion and  extension. 2.  Evidence of median nerve compression beneath the transverse carpal  ligament that was adequately decompressed with release of the transverse  carpal ligament. The ulnar nerve was decompressed throughout its  visualized course including distal forearm fascia to its trifurcation  distally. 3.  There was severe scarring of the dorsal radial aspect of the wrist  at its previous arthroplasty site. There was adhesions to the palmar  branch of the radial nerve that extended to the area of maximal  tenderness and revealed a large neuroma to be present. The dorsal  branch was intact and was without evidence of entrapment. 4.  Status post radial sensory nerve neurolysis, the neuroma was  excised, a nerve cap was placed, and the stump buried in the distal  radius beneath the muscular origin of the abductor pollicis longus  tendon. DISPOSITION:  The patient remained stable throughout the procedure and  was taken to the recovery room. OPERATIVE INDICATIONS:  The patient is a 68-year-old gentleman who has  persistent recalcitrant radial-sided wrist pain as well as cubital  tunnel and carpal tunnel symptoms. After failing conservative  management, he wished to proceed with surgical intervention. The risks  included, but not limited to risk of infection, damage to nerves,  vessels, or tendons, failure to improve his symptoms or worsening  symptoms, loss of sensation, continued scar sensitivity, need for  rehabilitation, additional surgery, and unforeseen complications. He  understood and wished to proceed. DESCRIPTION OF PROCEDURE:  The patient was identified in the holding  area. The left upper extremity was identified as the surgical site.   He  was then seen by Anesthesia, taken to the operating room, placed supine  on the table, and underwent general anesthesia per Anesthesia  Department. All bony prominences were well padded. A well-padded arm  tourniquet was placed. The left upper extremity was prepared and draped  in the standard sterile fashion. Attention was first directed towards the cubital tunnel. A curvilinear  incision just posterior to the medial epicondyle was then created  followed by blunt dissection, identification, and preservation of  sensory nerve branches. The ulnar nerve was identified just posterior  to the medial intermuscular septum and was decompressed above the medial  intermuscular septum for approximately 8 to 12 cm. The ulnar nerve was  then traced into the cubital tunnel proper. There was dense scarring  present. There was a hypertrophic medial head of the triceps as well as  an anconeus epitrochlearis. This was meticulously dissected off of the  cubital tunnel and ulnar nerve and the ulnar nerve was decompressed  distally into the flexor carpi ulnaris fascia. Both the superficial and  deep fascia were released. The first motor branch was identified and  mobilized to allow mobilization of the ulnar nerve. Given the scarring  and muscle abnormalities, anterior subcutaneous transposition was  performed. The medial intermuscular septum was identified proximally. This was taken down. Meticulous hemostasis was achieved with bipolar  cautery. The nerve was mobilized from proximal to distally. The flexor  carpi ulnaris fascia was released to allow smooth excursion of the nerve  into the anterior subcutaneous position. The ulnar nerve had smooth  excursion with elbow flexion and extension and was well aligned. At  this point, the wound was copiously irrigated out. A submuscular fat  flap was then placed to the medial epicondyle of protecting the ulnar  nerve in a transposed position and sewn into place with 0 Vicryl suture.   Local anesthetic was infiltrated into the soft tissues. Wound was  copiously irrigated out. Skin closed in layers. Attention was then directed towards the carpal tunnel release. An  incision in line with the radial border of the ring finger was then  created and extended proximally followed by blunt dissection of the  superficial palmar fascia, which was incised longitudinally followed by  blunt dissection and identification of the contents of Guyon's canal,  which was reflected off the distal forearm fascia and transverse carpal  ligament. The fascia was incised. The median nerve was identified and  then released from proximal to distally. The superficial palmar arch  was identified and protected and retracted. The remaining portions of  the transverse carpal ligament was divided. There was evidence of  medial nerve compression that was adequately decompressed with release  of the transverse carpal ligament. The remaining distal forearm fascia  was then released using blunt-tipped Metzenbaum scissors in a distal  proximal slide technique fully decompressing the nerve throughout its  course. The wound was copiously irrigated out. Local anesthetic  infiltrated into the soft tissues. Skin closed with nylon sutures. Attention was directed towards the revision de Quervain's release. The  scar was identified and reincised and extended proximally to identify a  normal anatomy. The radial sensory nerve was identified at this level. The dorsal large branch was intact and was neurolysed. This allowed  mobilization of the underlying abductor pollicis longus, which was  scarred. This was tenolysed from proximal to distally fully mobilizing  this tendon. The palmar branch at the location of the previously  identified hypersensitivity in the preoperative area was identified. This was found to have a large neuroma present, particularly to the  thenar muscle region. This nerve was neurolysed and the neuroma clearly  identified.   Based on patient's continued sensitivity in this region,  neurectomy was undertaken. Using a tongue blade and an 11-blade  scalpel, the neurectomy was completed. The neuroma was sent for  pathology. An AxoGen nerve cap was then placed under loupe  magnification around the nerve stump and sewn into place with 8-0 nylon  suture. Beneath the abductor pollicis longus muscle, between the muscle and the  brachioradialis insertion, a 3.2-mm drill bit was used to drill a bone  tunnel in the distal radius. The nerve cap was then placed within this  and sewn into place to the periosteum with 8-0 nylon suture. The muscle  was then allowed to retract back over the neuroma, which provided  tension-free placement and good coverage. With this completed, the  wound was copiously irrigated out. The tourniquet was deflated. Meticulous hemostasis was achieved with bipolar cautery. Skin closed in  layers. Sterile dressing and splint was applied. The patient was taken  to recovery room.         Lugene Crigler, MD    D: 06/08/2022 14:39:55       T: 06/08/2022 14:45:06     [id number]  Job#: [id number]     Doc#: [id number]    CC:

## 2022-06-16 ENCOUNTER — OFFICE VISIT (OUTPATIENT)
Dept: ORTHOPEDIC SURGERY | Age: 72
End: 2022-06-16

## 2022-06-16 VITALS — BODY MASS INDEX: 24.88 KG/M2 | WEIGHT: 168 LBS | HEIGHT: 69 IN

## 2022-06-16 DIAGNOSIS — M65.4 TENOSYNOVITIS, DE QUERVAIN: ICD-10-CM

## 2022-06-16 PROCEDURE — 99024 POSTOP FOLLOW-UP VISIT: CPT | Performed by: ORTHOPAEDIC SURGERY

## 2022-06-16 RX ORDER — OXYCODONE HYDROCHLORIDE AND ACETAMINOPHEN 5; 325 MG/1; MG/1
1 TABLET ORAL EVERY 6 HOURS PRN
Qty: 28 TABLET | Refills: 0 | Status: SHIPPED | OUTPATIENT
Start: 2022-06-16 | End: 2022-06-23

## 2022-06-16 NOTE — PROGRESS NOTES
HPI: Patient presents today POD #8 s/p left carpal tunnel release, ulnar nerve decompression at the elbow with anterior subcutaneous transposition, left revision de Quervain's tenolysis with radial sensory nerve neurolysis with neuroma excision and nerve. Overall he is doing well. He does report pain to his hand. Physical Exam: Incisions healing well with Dermabond and Steri-Strips in place. No erythema or signs of infection. Positive swelling to the wrist.  Stiffness with flexion extension of the fingers and the thumb. Median, ulnar nerves intact light touch. There is diminished sensation to the radial nerve distribution. Brisk capillary refill. Assessment:  POD #8 s/p left carpal tunnel release, ulnar nerve decompression at the elbow with anterior subcutaneous transposition, left revision de Quervain's tenolysis with radial sensory nerve neurolysis with neuroma excision and nerve    Plan:   Sutures removed today in the office. Scar management advised. Patient fabricated a thumb spica splint at today's visit. Patient referred to therapy for range of motion exercises and modalities and scar desensitization as needed. Okay to wean out of splint as symptoms allow. Follow up in 1 month  All questions and concerns answered. I have seen and evaluated the patient and agree with the above assessment and plan on today's visit. I have performed the key components of the history and physical examination with significant findings of postop. I concur with the findings and plan as documented.     Dorenda Bumpers, MD  6/16/2022

## 2022-06-22 DIAGNOSIS — G56.22 ULNAR NEUROPATHY AT ELBOW, LEFT: Primary | ICD-10-CM

## 2022-06-22 DIAGNOSIS — M65.4 TENOSYNOVITIS, DE QUERVAIN: ICD-10-CM

## 2022-06-22 DIAGNOSIS — G56.02 CARPAL TUNNEL SYNDROME OF LEFT WRIST: ICD-10-CM

## 2022-06-27 ENCOUNTER — EVALUATION (OUTPATIENT)
Dept: OCCUPATIONAL THERAPY | Age: 72
End: 2022-06-27
Payer: COMMERCIAL

## 2022-06-27 DIAGNOSIS — G56.22 ULNAR NEUROPATHY OF LEFT UPPER EXTREMITY: Primary | ICD-10-CM

## 2022-06-27 DIAGNOSIS — M65.4 DE QUERVAIN'S TENOSYNOVITIS: ICD-10-CM

## 2022-06-27 DIAGNOSIS — G56.02 CARPAL TUNNEL SYNDROME ON LEFT: ICD-10-CM

## 2022-06-27 PROCEDURE — 97110 THERAPEUTIC EXERCISES: CPT | Performed by: OCCUPATIONAL THERAPIST

## 2022-06-27 PROCEDURE — 97165 OT EVAL LOW COMPLEX 30 MIN: CPT | Performed by: OCCUPATIONAL THERAPIST

## 2022-06-27 PROCEDURE — 97530 THERAPEUTIC ACTIVITIES: CPT | Performed by: OCCUPATIONAL THERAPIST

## 2022-06-27 NOTE — PROGRESS NOTES
OCCUPATIONAL THERAPY INITIAL EVALUATION    [address] THERAPY   [place] [address] [place] [address]  Dept: [phone]  Loc: [phone]   Hannah Ellsworth OT Fax: [phone]    Date:  2022  Initial Evaluation Date: 22   Evaluating Therapist: Gamaliel Mariscal OT    Patient Name:  Lorraine England. 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, left   G56.02 (ICD-10-CM) - Carpal tunnel syndrome of left wrist   M65.4 (ICD-10-CM) - Tenosynovitis, de Quervain   Date of Surgery/Injury: 9650    Insurance/Certification information:  Workers Comp   end date-22   3x a week for 6 weeks   Claim #: [id number]  Plan of care signed (Y/N): N  Visit# / total visits:     Referring Practitioner:  Melissa Mcdonnell MD  Specific Practitioner Orders: ROM, Scar desensitization and management, modalities PRN    Assessment of current deficits   [] Functional mobility  [x] ADLs  [x] Strength   [] Cognition   [] Functional transfers   [x] IADLs  [] Safety Awareness  [x] Endurance   [x] Fine Motor Coordination  [] Balance  [] Vision/perception  [x] Sensation    [] Gross Motor Coordination [x] ROM  [x] Pain   [x] Edema    [x] Scar Adhesion/Skin Integrity     OT PLAN OF CARE   OT POC based on physician orders, patient diagnosis and results of clinical assessment    Frequency/Duration 2-3x a week for 18 visits  Specific OT Treatment to include:     [x] Instruction in HEP                   Modalities:  [x] Therapeutic Exercise        [x] Ultrasound               [] Electrical Stimulation/Attended  [x] PROM/Stretching                    [x] Fluidotherapy          [x]  Paraffin                   [x] AAROM  [x] AROM                 [] Iontophoresis:   [x] Tendon Glides                                               [x] Neuromuscular Re-Ed            [] ADL/IADL re-training    [x] Therapeutic Activity       [x] Pain Management with/without modalities PRN                 [x] Manual Therapy                      [x] Splinting                      [x] Scar Management                   []Joint Protection/Training  []Ergonomics                             [x] Joint Mobilization        [] Adaptive Equipment Assessment/Training                             [x] Manual Edema Mobilization   [] Myofascial Release                 [] Energy Conservation/Work Simplification  [x] GM/FM Coordination       [] Safety retraining/education per  individual diagnosis/goals  [x] Desensitization       Patient Specific Goal: to regain motion at his thumb                             GOALS (Long term same as Short term):  1) Patient will demonstrate good understanding of home program (exercises/activities/diagnosis/prognosis/goals) with good accuracy. 2) Patient will demonstrate increased active/passive range of motion of their  L wrist to [facility] for ADL/IADL completion. 3) Patient will demonstrate increased /pinch strength of at least 10 / 5 pinch pounds of their L hand. 4) Patient to report decreased pain in their affected L  upper extremity from 9/10 to 4/10 or less with resistive functional use. 5) Increase in fine motor function as evidenced by decreased time to complete 9-hole peg test and/or MRMT test by at least 5-10 seconds to increase independence in fine motor dressing tasks (buttons, zippers). 6) Patient will be knowledgeable of edema control techniques as evident with decreases from 19 cm circumferential of L wrist to at least 18-17 cm. 7) Patient will demonstrate a non-tender/non-adherent scar.      Past Medical History:   Past Medical History:   Diagnosis Date    Cancer ([facility] [address] 07/2018    prostate     History of blood transfusion     1985 after \"ulcer surgery\"    Hyperlipidemia     Hypertension     Left wrist pain     Pain     low back, sacral     Past Surgical History: Past Surgical History:   Procedure Laterality Date    ARM SURGERY Left 6/8/2022    LEFT ULNAR NERVE DECOMPRESSION AT ELBOW WITH TRANSPOSITION, LEFT RADIAL SENSORY NEUROLYSIS AND NEURECTOMY WITH NERVE CAP, REVISION DE QUERVAIN'S RELEASE performed by Masood Weeks MD at [facility] Left 6/8/2022    LEFT CARPAL TUNNEL RELEASE performed by Masood Weeks MD at [facility] Right 07/31/2018    CATARACT REMOVAL WITH IMPLANT Left 07/02/2019    CYST REMOVAL      lt. axilla    HAND SURGERY      HEMORRHOID SURGERY      HERNIA REPAIR      INTRACAPSULAR CATARACT EXTRACTION Left 7/2/2019    LEFT EYE CATARACT EMULSIFICATION IOL IMPLANT - COMPLEX performed by Dannielle Guy MD at [facility] Bilateral 08/17/2017    bilateral sacroiliac injection #1    NERVE BLOCK Bilateral 10/25/2017    Bilateral lumbar transforaminal epidural steroid injection    NERVE BLOCK Bilateral 11/02/2017    lumbar transforaminal epidural steriod injection #2    OTHER SURGICAL HISTORY  07-13-12    excision of cyst right 5th toe    MA XCAPSL CTRC RMVL INSJ IO LENS PROSTH W/O ECP Right 7/31/2018    CATARACT EXTRACTION WITH IOL RIGHT EYE performed by Dannielle Guy MD at [facility]      ulcer repair    WRIST ARTHROPLASTY Left 5/12/2021    WITH REVISION [address] ARTHROPLASTY AND MINI TIGHTROPE APPLICATION APPLICATION EXTENSOR TENOSYNOVECTOMY AND FCR TENOTOMY  ; PARTIAL TRAPEZOIDECTOMY performed by Masood Weeks MD at [place] [address] WRIST ARTHROSCOPY Left 5/12/2021    LEFT WRIST ARTHROSCOPY performed by Masood Weeks MD at [address] [place] [address]       Reason for Referral: Pt is a 66year old male who presents this date for initial occupational therapy evaluation following the surgical intervention detailed below.  Since surgery patient has been wearing a thumb spica splint fabricated by the referring physicians office-per physicians note patient is allowed to wean from splint as symptoms allow. PROCEDURES PERFORMED:  1. Left carpal tunnel release. 2.  Left anterior subcutaneous ulnar nerve transposition with decompression at elbow. 3.  Left revision de Quervain's tenolysis. 4.  Left radial sensory nerve neurolysis. 5.  Left radial sensory nerve neuroma excision and application of AxoGen  nerve cap followed by placement of the nerve stump within the distal  radius and sewn into place with 8-0 nylon suture. Home Living:   xPrior Level of Function: independent     Cognition:   Alert/Oriented x3     IADL STATUS:   Ind Mod I Min A Mod A Max A Dep Other   Homemaking Responsibility  x        Shopping Responsibility:  x        Mode of Transportation:  x        Leisure & Hobbies:  x        Work:       x     Comments: Pt states that he is only using his dominant R hand to complete heavy house hold tasks and is askign for assistance from family to complete yard work and laundry. ADL STATUS:   Ind Mod I Min A Mod A Max A Dep Other   Feeding: x         Grooming: x         Bathing:  x        UE Dressing:   x       LE Dressing:   x       Toileting: x         Transfers: x           Comments: Pt is primarily using his dominant R hand to complete tasks, assistance needed for fine motor dressing tasks with zippers, buttons and tieing shoes.      Pain Level: pain ranging from 6/10 to a 9/10    UE Assessment:  Left Upper Extremity ROM  FOREARM Pronation 80-90* full       Supination 80-90* 55        WRIST Flexion 0-80* 40       Extension 0-70* 15       Radial Deviation 0-20* 4       Ulnar Deviation 0-30* 3        Left Hand ROM   AROM []         PROM[]       THUMB MP Extension/Flexion  14-23* H /0-50* 22       IP Extension/Flexion  23-30* H/ 0-80* 14       Radial Abduction 53-71* 38       Palmar Abduction 50-71* 45       Opposition IF        Comment: Hand Dominance is right    Sensation: normal distribution among the median and ulnar nerve-tingling noted at the tips of the fingers    Edema Description/Circumferential Measurements:   circumferential around wrist 19 cm L wrist (17 cm R wrist)  Dynamometer (setting 2): not tested per protocol, to be assessed when appropriate     Left: NT      Right: NT    Pinch Meter:   Lateral: Left= NT,Right= NT    Palmar 3 point: Left= NT, Right= NT  9 Hole Peg Test:   Left: NT   Right: NT    QuickDASH Score: 88.6% disability    Intervention: Pt presents wearing fiber glass splint that was fabricated by the referring physician office, modified splint to add straps for patient to be able to elvia and doff independently. Pt issued compression sleeve for forearm to elbow to assist with scar management of the ulnar decompression scar as well as decrease pain. Pt issued and instructed in HEP detailed below-instructed to perform 3-5x a day, 10 repetitions. Will increase as tolerated. Issued HEP: tendon gliding, wrist ROM in all planes, thumb radial and palmar abduction and opposition from thumb to all fingers     Eval Complexity: low  Profile and History- Chart reviewed  Assessment of Occupational Performance and Identification of Deficits- 10   Clinical Decision Making- no additional modifications required    Rehab Potential:                                 [x] Good  [] Fair  [] Poor        Suggested Professional Referral:       [x] No  [] Yes:  Barriers to Goal Achievement[de-identified]          [x] No  [] Yes:  Domestic Concerns:                           [x] No  [] Yes:       Patient. Education:  [x] Plans/Goals, Risks/Benefits discussed  [x] Home exercise program  Method of Education: [x] Verbal  [x] Demo  [x] Written  Comprehension of Education:  [x] Verbalizes understanding. [x] Demonstrates understanding. [] Needs Review. [] Demonstrates/verbalizes understanding of HEP/Ed previously given.         Patient understands diagnosis/prognosis and consents to treatment, plan and goals: [x] Yes    [] No     Goal Formulation: Patient  Time In: 8:00 Time Out: 8:45                      Timed Code Treatment Minutes: 25 minutes      CODE  Minutes  Units   45214 OT Eval Low 20 1   10461 OT Eval Medium     86684 OT Eval High     21160 Fluidotherapy     93996 Manual     87890 Therapeutic Ex 10 1   58854 Therapeutic Activity 15 1   65787 ADL/COMP Tech Train     99908 Neuromuscular Re-Ed     71621 OrthoManagementTraining     41764 Paraffin     28022 Electrical Stim - Attended     [id number] Iontophoresis     14158 Ultrasound      Other                Electronically signed by: Conner López, [place] #[id number]     [email or web address] Certification / Comments      Frequency/Duration 2-3 / week for 18 visits. Certification period From: 6/27/22  To: 9/27/22     I have reviewed the Plan of Care established for skilled therapy services and certify that the services are required and that they will be provided while the patient is under my care. Physician's Comments/Revisions:                   Physicians's Printed Name:  Reji Romero MD                                   [de-identified] Signature:                                                               Date:      Please review Patient's OT evaluation and if you agree sign/date and fax back to us at our [facility] OT Fax: [phone].  Thank you for your referral!

## 2022-06-29 ENCOUNTER — TREATMENT (OUTPATIENT)
Dept: OCCUPATIONAL THERAPY | Age: 72
End: 2022-06-29
Payer: COMMERCIAL

## 2022-06-29 DIAGNOSIS — G56.22 ULNAR NEUROPATHY OF LEFT UPPER EXTREMITY: Primary | ICD-10-CM

## 2022-06-29 DIAGNOSIS — G56.02 CARPAL TUNNEL SYNDROME ON LEFT: ICD-10-CM

## 2022-06-29 DIAGNOSIS — M65.4 DE QUERVAIN'S TENOSYNOVITIS: ICD-10-CM

## 2022-06-29 PROCEDURE — 97530 THERAPEUTIC ACTIVITIES: CPT | Performed by: OCCUPATIONAL THERAPIST

## 2022-06-29 PROCEDURE — 97110 THERAPEUTIC EXERCISES: CPT | Performed by: OCCUPATIONAL THERAPIST

## 2022-06-29 PROCEDURE — 97140 MANUAL THERAPY 1/> REGIONS: CPT | Performed by: OCCUPATIONAL THERAPIST

## 2022-06-29 NOTE — PROGRESS NOTES
[address] [facility] [address]  Dept: [address] [place] [address] [address]: [phone]   Joshua Kaba OT Fax: [phone]    OCCUPATIONAL THERAPY PROGRESS NOTE    Date:  2022  Initial Evaluation Date: 22    Patient Name:  Eliud Mckeon :  1950    Restrictions/Precautions: Follow protocol, low fall risk  Diagnosis:    G56.22 (ICD-10-CM) - Ulnar neuropathy at elbow, left   G56.02 (ICD-10-CM) - Carpal tunnel syndrome of left wrist   M65.4 (ICD-10-CM) - Tenosynovitis, de Quervain   Date of Surgery/Injury: 4422     Insurance/Certification information:  Workers Comp ( Claim #: [id number])                             Plan of care signed (Y/N): Y (thru 22)  Visit# / total visits:  ( end date 22)     Referring Practitioner:  Tiago De Oliveira MD  Specific Practitioner Orders: ROM, Scar desensitization and management, modalities PRN     Assessment of current deficits   []? Functional mobility             [x]? ADLs          [x]? Strength                  []? Cognition   []? Functional transfers           [x]? IADLs         []? Safety Awareness  [x]? Endurance   [x]? Fine Motor Coordination    []? Balance      []? Vision/perception   [x]? Sensation     []? Gross Motor Coordination [x]? ROM           [x]? Pain                        [x]? Edema          [x]? Scar Adhesion/Skin Integrity      OT PLAN OF CARE   OT POC based on physician orders, patient diagnosis and results of clinical assessment     Frequency/Duration 2-3x a week for 18 visits  Specific OT Treatment to include:      [x]? Instruction in HEP                   Modalities:  [x]?  Therapeutic Exercise                 [x]? Ultrasound               []? Electrical Stimulation/Attended  [x]? PROM/Stretching                    [x]? Fluidotherapy          [x]?   Paraffin                   [x]? AAROM  [x]?  AROM                 []? Iontophoresis:   [x]? Efrain Bah                                    [x]? Neuromuscular Re-Ed            []? ADL/IADL re-training    [x]?  Therapeutic Activity                  [x]? Pain Management with/without modalities PRN                 [x]?  Manual Therapy                      [x]? Splinting                                   [x]? Scar Management                   []?Joint Protection/Training  []? Ergonomics                             [x]?  Joint Mobilization                      []? Adaptive Equipment Assessment/Training                             [x]?  Manual Edema Mobilization   []? Myofascial Release                 []? Energy Conservation/Work Simplification  [x]? GM/FM Coordination                []? Safety retraining/education per  individual diagnosis/goals  [x]? Desensitization        Patient Specific Goal: to regain motion at his thumb                             GOALS (Long term same as Short term):  1) Patient will demonstrate good understanding of home program (exercises/activities/diagnosis/prognosis/goals) with good accuracy. 2) Patient will demonstrate increased active/passive range of motion of their  L wrist to [facility] for ADL/IADL completion. 3) Patient will demonstrate increased /pinch strength of at least 10 / 5 pinch pounds of their L hand. 4) Patient to report decreased pain in their affected L  upper extremity from 9/10 to 4/10 or less with resistive functional use. 5) Increase in fine motor function as evidenced by decreased time to complete 9-hole peg test and/or MRMT test by at least 5-10 seconds to increase independence in fine motor dressing tasks (buttons, zippers). 6) Patient will be knowledgeable of edema control techniques as evident with decreases from 19 cm circumferential of L wrist to at least 18-17 cm.   7) Patient will demonstrate a non-tender/non-adherent scar.        Pain Level: 7-8 on scale of 1-10, aching, throbbing and uncomfortable  In the elbow/ wrist/ thumb    Subjective: \" It hurts but I've got to do this. \"  Objective:  Updated POC to be completed by 7-27-22. INTERVENTION: COMPLETED: SPECIFICS/COMMENTS:   Modality:     MH x wrist/ CT and elbow x - 5 min        AROM:     Hand/ thumb AROM/ AAROM X  x - thumb AROM all planes  - opposition to index and middle finger with pom poms   Elbow/ Wrist AROM X  x - table top ball ex for combined flexion/ ext- 10x  -Wrist AROM all planes     Full arm ROM x - over shld grasp and release cones to simulate getting items in/ out cabinets   AAROM:               PROM/Stretching:     Wrist PROM x - gentle PROM/ less painful   Thumb PROM x - gentle PROM IP/ MP/ flexion/ ABD/ radial ext   Scar Mass/Edema Control:     Scar mgmt x - scar massage to all incisional sites/ most sensitive in the radial thumb        Strengthening:               Other:                 Assessment/Comments: Pt is making Fair progress toward stated plan of care. Tx focus placed on scar mgmt, ROM and functional use of the left arm. Swelling in the arm is mild with sensitivity on the CT and radial thumb scars. The importance of scar massage and doing home ROM discussed. Will continue to advance as pt can tolerate.     -Rehab Potential: Good  -Requires OT Follow Up: Yes    Time In:1400            Time Out: 1500             Visit #: 2    Treatment Charges: Mins Time in - Time out  Units   Modalities      Ther Exercise 25 3237-0728 2   Manual Therapy 15 2067-1768 1   Thera Activities 15 0464-0147 1   ADL/Home Mgt       Neuro Re-education      Gait Training      Group Therapy      Non-Billable Service Time:  5 1400- 1405 0   Other      Total Time/Units 60  4     -Response to Treatment: Pt reports soreness in the FCR insertion and in the radial wrist by Tx end. Pt was reminded to use ice at home as needed. Goals: Goals for pt can be see on initial eval occurring on 6-27-22    Plan:   [x]  Continue Plan of care: Monitor splinting needs. Advance ROM as tolerated.  Pt education continues at each visit

## 2022-07-01 ENCOUNTER — TREATMENT (OUTPATIENT)
Dept: OCCUPATIONAL THERAPY | Age: 72
End: 2022-07-01
Payer: COMMERCIAL

## 2022-07-01 DIAGNOSIS — G56.02 CARPAL TUNNEL SYNDROME ON LEFT: ICD-10-CM

## 2022-07-01 DIAGNOSIS — M65.4 DE QUERVAIN'S TENOSYNOVITIS: ICD-10-CM

## 2022-07-01 DIAGNOSIS — G56.22 ULNAR NEUROPATHY OF LEFT UPPER EXTREMITY: Primary | ICD-10-CM

## 2022-07-01 PROCEDURE — 97018 PARAFFIN BATH THERAPY: CPT | Performed by: OCCUPATIONAL THERAPY ASSISTANT

## 2022-07-01 PROCEDURE — 97110 THERAPEUTIC EXERCISES: CPT | Performed by: OCCUPATIONAL THERAPY ASSISTANT

## 2022-07-01 PROCEDURE — 97140 MANUAL THERAPY 1/> REGIONS: CPT | Performed by: OCCUPATIONAL THERAPY ASSISTANT

## 2022-07-01 PROCEDURE — 97530 THERAPEUTIC ACTIVITIES: CPT | Performed by: OCCUPATIONAL THERAPY ASSISTANT

## 2022-07-01 NOTE — PROGRESS NOTES
[address] [facility] [address]  Dept: [address] [place] [address] [address]: [phone]   Jacqulin Gowers OT Fax: [phone]    OCCUPATIONAL THERAPY PROGRESS NOTE    Date:  2022  Initial Evaluation Date: 22    Patient Name:  Yo Plasencia :  1950    Restrictions/Precautions: Follow protocol, low fall risk  Diagnosis:    G56.22 (ICD-10-CM) - Ulnar neuropathy at elbow, left   G56.02 (ICD-10-CM) - Carpal tunnel syndrome of left wrist   M65.4 (ICD-10-CM) - Tenosynovitis, de Quervain   Date of Surgery/Injury: 9/3/0004     Insurance/Certification information:  Workers Comp ( Claim #: [id number])                             Plan of care signed (Y/N): Y (thru 22)  Visit# / total visits: 3/ 18 ( end date 22)     Referring Practitioner:  Erika Jose MD  Specific Practitioner Orders: ROM, Scar desensitization and management, modalities PRN     Assessment of current deficits   []? Functional mobility             [x]? ADLs          [x]? Strength                  []? Cognition   []? Functional transfers           [x]? IADLs         []? Safety Awareness  [x]? Endurance   [x]? Fine Motor Coordination    []? Balance      []? Vision/perception   [x]? Sensation     []? Gross Motor Coordination [x]? ROM           [x]? Pain                        [x]? Edema          [x]? Scar Adhesion/Skin Integrity      OT PLAN OF CARE   OT POC based on physician orders, patient diagnosis and results of clinical assessment     Frequency/Duration 2-3x a week for 18 visits  Specific OT Treatment to include:      [x]? Instruction in HEP                   Modalities:  [x]?  Therapeutic Exercise                 [x]? Ultrasound               []? Electrical Stimulation/Attended  [x]? PROM/Stretching                    [x]? Fluidotherapy          [x]?   Paraffin                   [x]? AAROM  [x]?  AROM                 []? Iontophoresis:   [x]? Malathi Alert                                    [x]? Neuromuscular Re-Ed            []? ADL/IADL re-training    [x]?  Therapeutic Activity                  [x]? Pain Management with/without modalities PRN                 [x]?  Manual Therapy                      [x]? Splinting                                   [x]? Scar Management                   []?Joint Protection/Training  []? Ergonomics                             [x]?  Joint Mobilization                      []? Adaptive Equipment Assessment/Training                             [x]?  Manual Edema Mobilization   []? Myofascial Release                 []? Energy Conservation/Work Simplification  [x]? GM/FM Coordination                []? Safety retraining/education per  individual diagnosis/goals  [x]? Desensitization        Patient Specific Goal: to regain motion at his thumb                             GOALS (Long term same as Short term):  1) Patient will demonstrate good understanding of home program (exercises/activities/diagnosis/prognosis/goals) with good accuracy. 2) Patient will demonstrate increased active/passive range of motion of their  L wrist to [facility] for ADL/IADL completion. 3) Patient will demonstrate increased /pinch strength of at least 10 / 5 pinch pounds of their L hand. 4) Patient to report decreased pain in their affected L  upper extremity from 9/10 to 4/10 or less with resistive functional use. 5) Increase in fine motor function as evidenced by decreased time to complete 9-hole peg test and/or MRMT test by at least 5-10 seconds to increase independence in fine motor dressing tasks (buttons, zippers). 6) Patient will be knowledgeable of edema control techniques as evident with decreases from 19 cm circumferential of L wrist to at least 18-17 cm.   7) Patient will demonstrate a non-tender/non-adherent scar.        Pain Level: 8 on scale of 1-10, aching, throbbing and uncomfortable  In the elbow/ wrist/ thumb    Subjective:  Pt. Stated my pain is mostly in the base of my thumb\"     Objective:  Updated POC to be completed by 7-27-22. INTERVENTION: COMPLETED: SPECIFICS/COMMENTS:   Modality:     MH x wrist/ CT and elbow x - 5 min   Paraffin bath x -10 mins    AROM:     Hand/ thumb AROM/ AAROM X    x - thumb AROM all planes  - opposition to index and middle finger with pom poms  -alt opposition with small beads. Elbow/ Wrist AROM   x - table top ball ex for combined flexion/ ext- 10x  -Wrist AROM all planes     Full arm ROM x - over shld grasp and release cones to simulate getting items in/ out cabinets   AAROM:               PROM/Stretching:     Wrist PROM x - gentle PROM/ less painful   Thumb PROM x - gentle PROM IP/ MP/ flexion/ ABD/ radial ext   Scar Mass/Edema Control:     Scar mgmt x - scar massage to all incisional sites/ most sensitive in the radial thumb        Strengthening:               Other:     L elbow  x -Issued elbow protector to wear at night to protect Ulnar nerve. Assessment/Comments: Pt is making Fair progress toward stated plan of care. Pt. Tolerated all exercises and stretches, but has increased hypersensitivity at all scar locations. Will continue to manage pain and advance as tolerated. -Rehab Potential: Good  -Requires OT Follow Up: Yes    Time In:1405           Time Out: 1500             Visit #:3     Treatment Charges: Mins Time in - Time out  Units   Modalities paraffin 10 0358-1129 1   Ther Exercise 20 3814-7645 1   Manual Therapy 15 7828-9443 1   Thera Activities 10 3891-3878 1   ADL/Home Mgt       Neuro Re-education      Gait Training      Group Therapy      Non-Billable Service Time:       Other      Total Time/Units 55  4     -Response to Treatment: Pt reports soreness in the FCR insertion and in the radial wrist by Tx end. Pt was reminded to use ice at home as needed.    Goals: Goals for pt can be see on initial eval occurring on 6-27-22    Plan:   [x]  Continue Plan of care:  Advance ROM and [facility] as tolerated. Pt education continues at each visit to obtain maximum benefits from skilled OT intervention.   []  Alter Plan of care:   []  Discharge:      [facility], [facility] /L  [id number]

## 2022-07-06 ENCOUNTER — TREATMENT (OUTPATIENT)
Dept: OCCUPATIONAL THERAPY | Age: 72
End: 2022-07-06
Payer: COMMERCIAL

## 2022-07-06 DIAGNOSIS — M65.4 DE QUERVAIN'S TENOSYNOVITIS: ICD-10-CM

## 2022-07-06 DIAGNOSIS — G56.22 ULNAR NEUROPATHY OF LEFT UPPER EXTREMITY: Primary | ICD-10-CM

## 2022-07-06 DIAGNOSIS — G56.02 CARPAL TUNNEL SYNDROME ON LEFT: ICD-10-CM

## 2022-07-06 PROCEDURE — 97110 THERAPEUTIC EXERCISES: CPT | Performed by: OCCUPATIONAL THERAPY ASSISTANT

## 2022-07-06 PROCEDURE — 97140 MANUAL THERAPY 1/> REGIONS: CPT | Performed by: OCCUPATIONAL THERAPY ASSISTANT

## 2022-07-06 PROCEDURE — 97018 PARAFFIN BATH THERAPY: CPT | Performed by: OCCUPATIONAL THERAPY ASSISTANT

## 2022-07-06 PROCEDURE — 97530 THERAPEUTIC ACTIVITIES: CPT | Performed by: OCCUPATIONAL THERAPY ASSISTANT

## 2022-07-06 NOTE — PROGRESS NOTES
[address] [place] [address]  Dept: [address] [place] [address] [address]: [phone]   [facility] [facility] Fax: [phone]    OCCUPATIONAL THERAPY PROGRESS NOTE    Date:  2022  Initial Evaluation Date: 22    Patient Name:  Hortensia Cazares. 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, left   G56.02 (ICD-10-CM) - Carpal tunnel syndrome of left wrist   M65.4 (ICD-10-CM) - Tenosynovitis, de Quervain   Date of Surgery/Injury: 3/6/9632     Insurance/Certification information:  Workers Comp ( Claim #: [id number])                             Plan of care signed (Y/N): Y (thru 22)  Visit# / total visits:  ( end date 22)     Referring Practitioner:  Marian Marcos MD  Specific Practitioner Orders: ROM, Scar desensitization and management, modalities PRN     Assessment of current deficits   [] Functional mobility             [x] ADLs          [x] Strength                  [] Cognition   [] Functional transfers           [x] IADLs         [] Safety Awareness  [x] Endurance   [x] Fine Motor Coordination    [] Balance      [] Vision/perception   [x] Sensation     [] Gross Motor Coordination [x] ROM           [x] Pain                        [x] Edema          [x] Scar Adhesion/Skin Integrity      OT PLAN OF CARE   OT POC based on physician orders, patient diagnosis and results of clinical assessment     Frequency/Duration 2-3x a week for 18 visits  Specific OT Treatment to include:      [x] Instruction in HEP                   Modalities:  [x] Therapeutic Exercise                 [x] Ultrasound               [] Electrical Stimulation/Attended  [x] PROM/Stretching                    [x] Fluidotherapy          [x]  Paraffin                   [x] AAROM  [x] AROM                 [] Iontophoresis:   [x] Tendon Glides                                               [x] Neuromuscular Re-Ed [] ADL/IADL re-training    [x] Therapeutic Activity                  [x] Pain Management with/without modalities PRN                 [x] Manual Therapy                      [x] Splinting                                   [x] Scar Management                   []Joint Protection/Training  []Ergonomics                             [x] Joint Mobilization                      [] Adaptive Equipment Assessment/Training                             [x] Manual Edema Mobilization   [] Myofascial Release                 [] Energy Conservation/Work Simplification  [x] GM/FM Coordination                [] Safety retraining/education per  individual diagnosis/goals  [x] Desensitization        Patient Specific Goal: to regain motion at his thumb                             GOALS (Long term same as Short term):  1) Patient will demonstrate good understanding of home program (exercises/activities/diagnosis/prognosis/goals) with good accuracy. 2) Patient will demonstrate increased active/passive range of motion of their  L wrist to [facility] for ADL/IADL completion. 3) Patient will demonstrate increased /pinch strength of at least 10 / 5 pinch pounds of their L hand. 4) Patient to report decreased pain in their affected L  upper extremity from 9/10 to 4/10 or less with resistive functional use. 5) Increase in fine motor function as evidenced by decreased time to complete 9-hole peg test and/or MRMT test by at least 5-10 seconds to increase independence in fine motor dressing tasks (buttons, zippers). 6) Patient will be knowledgeable of edema control techniques as evident with decreases from 19 cm circumferential of L wrist to at least 18-17 cm. 7) Patient will demonstrate a non-tender/non-adherent scar.         Pain Level: 8 on scale of 1-10, aching, throbbing and uncomfortable  In the elbow/ wrist/ thumb    Subjective:  Pt. Stated my pain is mostly in the base of my thumb\"     Objective:  Updated POC to be completed by 7-27-22. INTERVENTION: COMPLETED: SPECIFICS/COMMENTS:   Modality:      elbow x - 5 min   US X -5 mins - int- 0.8 at 3.3 mhz at 50% over CT area and CMC of thumb . Paraffin bath  x -10 mins    AROM:     Hand/ thumb AROM/ AAROM X      x - thumb AROM all planes  - opposition to index and middle finger with pom poms  -alt opposition with small beads.   -gather pom poms in L hand then place one at a time. Elbow/ Wrist AROM   x - table top ball ex for combined flexion/ ext- 10x  -Wrist AROM all planes     Full arm ROM  - over shld grasp and release cones to simulate getting items in/ out cabinets   AAROM:               PROM/Stretching:     Wrist PROM x - gentle PROM/ less painful   Thumb PROM x - gentle PROM IP/ MP/ flexion/ ABD/ radial ext   Scar Mass/Edema Control:     Scar mgmt x - scar massage to all incisional sites/ most sensitive in the radial thumb        Strengthening:               Other:     L elbow  x -Issued elbow protector to wear at night to protect Ulnar nerve. Assessment/Comments: Pt is making Fair progress toward stated plan of care. Pt. Tolerated all exercises and stretches, but remains limited by his pain. Will continue to monitor pain and advance as tolerated. -Rehab Potential: Good  -Requires OT Follow Up: Yes    Time In:1405           Time Out: 1500             Visit #:4    Treatment Charges: Mins Time in - Time out  Units   Modalities paraffin/US 10/5 9601-8415  8483-6608 1  0   Ther Exercise 15 3319-9845 1   Manual Therapy 15 0389-7240 1   Thera Activities 10 3758-4543 1   ADL/Home Mgt       Neuro Re-education      Gait Training      Group Therapy      Non-Billable Service Time:       Other      Total Time/Units 55  4     -Response to Treatment: Pt reports soreness in the FCR insertion and in the radial wrist by Tx end. Pt was reminded to use ice at home as needed.    Goals: Goals for pt can be see on initial eval occurring on 6-27-22    Plan:   [x]  Continue Plan of care:  Advance ROM and [facility] as tolerated. Pt education continues at each visit to obtain maximum benefits from skilled OT intervention.   []  Alter Plan of care:   []  Discharge:      [facility], [facility] /L  [id number]

## 2022-07-08 ENCOUNTER — TREATMENT (OUTPATIENT)
Dept: OCCUPATIONAL THERAPY | Age: 72
End: 2022-07-08
Payer: COMMERCIAL

## 2022-07-08 DIAGNOSIS — M65.4 DE QUERVAIN'S TENOSYNOVITIS: ICD-10-CM

## 2022-07-08 DIAGNOSIS — G56.22 ULNAR NEUROPATHY OF LEFT UPPER EXTREMITY: Primary | ICD-10-CM

## 2022-07-08 DIAGNOSIS — G56.02 CARPAL TUNNEL SYNDROME ON LEFT: ICD-10-CM

## 2022-07-08 PROCEDURE — 97530 THERAPEUTIC ACTIVITIES: CPT | Performed by: OCCUPATIONAL THERAPY ASSISTANT

## 2022-07-08 PROCEDURE — 97140 MANUAL THERAPY 1/> REGIONS: CPT | Performed by: OCCUPATIONAL THERAPY ASSISTANT

## 2022-07-08 PROCEDURE — 97110 THERAPEUTIC EXERCISES: CPT | Performed by: OCCUPATIONAL THERAPY ASSISTANT

## 2022-07-08 NOTE — PROGRESS NOTES
[address] [facility] [address]  Dept: [address] [place] [address] [address]: [phone]   Edwin Lewis OT Fax: [phone]    OCCUPATIONAL THERAPY PROGRESS NOTE    Date:  2022  Initial Evaluation Date: 22    Patient Name:  Luis Doyle. 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, left   G56.02 (ICD-10-CM) - Carpal tunnel syndrome of left wrist   M65.4 (ICD-10-CM) - Tenosynovitis, de Quervain   Date of Surgery/Injury: 999     Insurance/Certification information:  Workers Comp ( Claim #: [id number])                             Plan of care signed (Y/N): Y (thru 22)  Visit# / total visits:  ( end date 22)     Referring Practitioner:  Heraclio Foss MD  Specific Practitioner Orders: ROM, Scar desensitization and management, modalities PRN     Assessment of current deficits   [] Functional mobility             [x] ADLs          [x] Strength                  [] Cognition   [] Functional transfers           [x] IADLs         [] Safety Awareness  [x] Endurance   [x] Fine Motor Coordination    [] Balance      [] Vision/perception   [x] Sensation     [] Gross Motor Coordination [x] ROM           [x] Pain                        [x] Edema          [x] Scar Adhesion/Skin Integrity      OT PLAN OF CARE   OT POC based on physician orders, patient diagnosis and results of clinical assessment     Frequency/Duration 2-3x a week for 18 visits  Specific OT Treatment to include:      [x] Instruction in HEP                   Modalities:  [x] Therapeutic Exercise                 [x] Ultrasound               [] Electrical Stimulation/Attended  [x] PROM/Stretching                    [x] Fluidotherapy          [x]  Paraffin                   [x] AAROM  [x] AROM                 [] Iontophoresis:   [x] Tendon Glides                                               [x] Neuromuscular Re-Ed 7-27-22. INTERVENTION: COMPLETED: SPECIFICS/COMMENTS:   Modality:      elbow  - 5 min   US  -5 mins - int- 0.8 at 3.3 mhz at 50% over CT area and CMC of thumb . Paraffin bath   -10 mins    AROM:     Hand/ thumb AROM/ AAROM X      X  x - thumb AROM all planes  - opposition to index and middle finger with pom poms  -alt opposition with small beads. -bunchems deconstruction and alt prehension  -gather pom poms in L hand then place one at a time. Elbow/ Wrist AROM X    x - table top ball ex for combined flexion/ ext and pronation/supination- 15 reps each  -Wrist AROM all planes     Full arm ROM  - over shld grasp and release cones to simulate getting items in/ out cabinets   AAROM:               PROM/Stretching:     Wrist PROM x - gentle PROM/ less painful   Thumb PROM x - gentle PROM IP/ MP/ flexion/ ABD/ radial ext   Scar Mass/Edema Control:     Scar mgmt x - scar massage to all incisional sites/ most sensitive in the radial thumb        Strengthening:               Other:     L elbow  x -Issued elbow protector to wear at night to protect Ulnar nerve.      -issued scar gel for scar management at base of thumb      Assessment/Comments: Pt is making Fair progress toward stated plan of care. Pt. Tolerated all exercises and stretches, but remains limited by his pain. Increased pain at all scar sites. Will continue to progress as tolerated. Collaborated with OT on current plan of care.        -Rehab Potential: Good  -Requires OT Follow Up: Yes    Time In:1405           Time Out: 1450             Visit #5    Treatment Charges: Mins Time in - Time out  Units   Modalities       Ther Exercise 15 7049-9010 1   Manual Therapy 15 7285-4677 1   Thera Activities 15 4628-4417 1   ADL/Home Mgt       Neuro Re-education      Gait Training      Group Therapy      Non-Billable Service Time:       Other      Total Time/Units 45  4     -Response to Treatment: Pt reports soreness in the FCR insertion and in the radial wrist by Tx end. Pt was reminded to use ice at home as needed. Goals: Goals for pt can be see on initial eval occurring on 6-27-22    Plan:   [x]  Continue Plan of care:  Advance ROM and [facility] as tolerated. Pt education continues at each visit to obtain maximum benefits from skilled OT intervention.   []  Alter Plan of care:   []  Discharge:      [facility], [place] /L  [id number]

## 2022-07-13 ENCOUNTER — TREATMENT (OUTPATIENT)
Dept: OCCUPATIONAL THERAPY | Age: 72
End: 2022-07-13
Payer: COMMERCIAL

## 2022-07-13 DIAGNOSIS — M65.4 DE QUERVAIN'S TENOSYNOVITIS: ICD-10-CM

## 2022-07-13 DIAGNOSIS — G56.02 CARPAL TUNNEL SYNDROME ON LEFT: ICD-10-CM

## 2022-07-13 DIAGNOSIS — G56.22 ULNAR NEUROPATHY OF LEFT UPPER EXTREMITY: Primary | ICD-10-CM

## 2022-07-13 PROCEDURE — 97110 THERAPEUTIC EXERCISES: CPT | Performed by: OCCUPATIONAL THERAPY ASSISTANT

## 2022-07-13 PROCEDURE — 97140 MANUAL THERAPY 1/> REGIONS: CPT | Performed by: OCCUPATIONAL THERAPY ASSISTANT

## 2022-07-13 PROCEDURE — 97018 PARAFFIN BATH THERAPY: CPT | Performed by: OCCUPATIONAL THERAPY ASSISTANT

## 2022-07-13 NOTE — PROGRESS NOTES
[address] [place] [address]  Dept: [address] [place] [address] [address]: [phone]   Manan Horvath OT Fax: [phone]    OCCUPATIONAL THERAPY PROGRESS NOTE    Date:  2022  Initial Evaluation Date: 22    Patient Name:  Marie Lim. 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, left   G56.02 (ICD-10-CM) - Carpal tunnel syndrome of left wrist   M65.4 (ICD-10-CM) - Tenosynovitis, de Quervain   Date of Surgery/Injury: 9438     Insurance/Certification information:  Workers Comp ( Claim #: [id number])                             Plan of care signed (Y/N): Y (thru 22)  Visit# / total visits:  ( end date 22)     Referring Practitioner:  Sita Motta MD  Specific Practitioner Orders: ROM, Scar desensitization and management, modalities PRN     Assessment of current deficits   [] Functional mobility             [x] ADLs          [x] Strength                  [] Cognition   [] Functional transfers           [x] IADLs         [] Safety Awareness  [x] Endurance   [x] Fine Motor Coordination    [] Balance      [] Vision/perception   [x] Sensation     [] Gross Motor Coordination [x] ROM           [x] Pain                        [x] Edema          [x] Scar Adhesion/Skin Integrity      OT PLAN OF CARE   OT POC based on physician orders, patient diagnosis and results of clinical assessment     Frequency/Duration 2-3x a week for 18 visits  Specific OT Treatment to include:      [x] Instruction in HEP                   Modalities:  [x] Therapeutic Exercise                 [x] Ultrasound               [] Electrical Stimulation/Attended  [x] PROM/Stretching                    [x] Fluidotherapy          [x]  Paraffin                   [x] AAROM  [x] AROM                 [] Iontophoresis:   [x] Tendon Glides                                               [x] Neuromuscular Re-Ed [] ADL/IADL re-training    [x] Therapeutic Activity                  [x] Pain Management with/without modalities PRN                 [x] Manual Therapy                      [x] Splinting                                   [x] Scar Management                   []Joint Protection/Training  []Ergonomics                             [x] Joint Mobilization                      [] Adaptive Equipment Assessment/Training                             [x] Manual Edema Mobilization   [] Myofascial Release                 [] Energy Conservation/Work Simplification  [x] GM/FM Coordination                [] Safety retraining/education per  individual diagnosis/goals  [x] Desensitization        Patient Specific Goal: to regain motion at his thumb                             GOALS (Long term same as Short term):  1) Patient will demonstrate good understanding of home program (exercises/activities/diagnosis/prognosis/goals) with good accuracy. 2) Patient will demonstrate increased active/passive range of motion of their  L wrist to [facility] for ADL/IADL completion. 3) Patient will demonstrate increased /pinch strength of at least 10 / 5 pinch pounds of their L hand. 4) Patient to report decreased pain in their affected L  upper extremity from 9/10 to 4/10 or less with resistive functional use. 5) Increase in fine motor function as evidenced by decreased time to complete 9-hole peg test and/or MRMT test by at least 5-10 seconds to increase independence in fine motor dressing tasks (buttons, zippers). 6) Patient will be knowledgeable of edema control techniques as evident with decreases from 19 cm circumferential of L wrist to at least 18-17 cm. 7) Patient will demonstrate a non-tender/non-adherent scar. Pain Level: 7 on scale of 1-10, aching, throbbing and uncomfortable  In the elbow/ wrist/ thumb    Subjective:  Pt. Stated \"When I wake up in the morning my arm is all agitated. \"    Objective:  Updated POC to be completed by 7-27-22. INTERVENTION: COMPLETED: SPECIFICS/COMMENTS:   Modality:      elbow  - 5 min   US  -5 mins - int- 0.8 at 3.3 mhz at 50% over CT area and CMC of thumb . Paraffin bath  x -10 mins    AROM:     Hand/ thumb AROM/ AAROM X      X    x - thumb AROM all planes  - opposition to index and middle finger with pom poms  -alt opposition with small beads. -bunchems deconstruction and alt prehension  -gather pom poms in L hand then place one at a time. -exercise spheres- 20 reps CCW   Elbow/ Wrist AROM X    X  x - table top ball ex for combined flexion/ ext and pronation/supination- 15 reps each  -Wrist AROM all planes  -Ball on wall ex to increase AROM of LUE. - 7 reps      Full arm ROM  - over shld grasp and release cones to simulate getting items in/ out cabinets   AAROM:               PROM/Stretching:     Wrist PROM x - gentle PROM/ less painful   Thumb PROM x - gentle PROM IP/ MP/ flexion/ ABD/ radial ext   Scar Mass/Edema Control:     Scar mgmt x - scar massage to all incisional sites/ most sensitive in the radial thumb        Strengthening:               Other:     L elbow  x -Issued elbow protector to wear at night to protect Ulnar nerve.      -issued scar gel for scar management at base of thumb      Assessment/Comments: Pt is making Fair progress toward stated plan of care. Pt. Tolerated all exercises and stretches, but remains limited by his pain. Coordination is improving. Will continue to progress as tolerated. Collaborated with OT on current plan of care.        -Rehab Potential: Good  -Requires OT Follow Up: Yes    Time In: 1100         Time Out: 1145            Visit #6    Treatment Charges: Mins Time in - Time out  Units   Modalities  10 9386-6560 1   Ther Exercise 20 2333-5838 1   Manual Therapy 15 7344-1752 1   Thera Activities      ADL/Home Mgt       Neuro Re-education      Gait Training      Group Therapy      Non-Billable Service Time:       Other      Total Time/Units 45 3     -Response to Treatment: Pt reports soreness in the FCR insertion and in the radial wrist by Tx end. Pt was reminded to use ice at home as needed. Goals: Goals for pt can be see on initial eval occurring on 6-27-22    Plan:   [x]  Continue Plan of care: Focus on AROM, prehension and pain management next session. Pt education continues at each visit to obtain maximum benefits from skilled OT intervention.   []  Alter Plan of care:   []  Discharge:      [facility], [place] /L  [id number]

## 2022-07-14 ENCOUNTER — TREATMENT (OUTPATIENT)
Dept: OCCUPATIONAL THERAPY | Age: 72
End: 2022-07-14
Payer: COMMERCIAL

## 2022-07-14 DIAGNOSIS — G56.22 ULNAR NEUROPATHY OF LEFT UPPER EXTREMITY: Primary | ICD-10-CM

## 2022-07-14 DIAGNOSIS — M65.4 DE QUERVAIN'S TENOSYNOVITIS: ICD-10-CM

## 2022-07-14 DIAGNOSIS — G56.02 CARPAL TUNNEL SYNDROME ON LEFT: ICD-10-CM

## 2022-07-14 PROCEDURE — 97140 MANUAL THERAPY 1/> REGIONS: CPT | Performed by: OCCUPATIONAL THERAPY ASSISTANT

## 2022-07-14 PROCEDURE — 97018 PARAFFIN BATH THERAPY: CPT | Performed by: OCCUPATIONAL THERAPY ASSISTANT

## 2022-07-14 PROCEDURE — 97110 THERAPEUTIC EXERCISES: CPT | Performed by: OCCUPATIONAL THERAPY ASSISTANT

## 2022-07-14 NOTE — PROGRESS NOTES
[address] [address]  Dept: [address] [place] [address] [address]: [phone]   [facility] Fax: [phone]    OCCUPATIONAL THERAPY PROGRESS NOTE    Date:  2022  Initial Evaluation Date: 22    Patient Name:  Matthew Benitez. 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, left   G56.02 (ICD-10-CM) - Carpal tunnel syndrome of left wrist   M65.4 (ICD-10-CM) - Tenosynovitis, de Quervain   Date of Surgery/Injury: 2988     Insurance/Certification information:  Workers Comp ( Claim #: [id number])                             Plan of care signed (Y/N): Y (thru 22)  Visit# / total visits:  ( end date 22)     Referring Practitioner:  Douglas Schumacher MD  Specific Practitioner Orders: ROM, Scar desensitization and management, modalities PRN     Assessment of current deficits   [] Functional mobility             [x] ADLs          [x] Strength                  [] Cognition   [] Functional transfers           [x] IADLs         [] Safety Awareness  [x] Endurance   [x] Fine Motor Coordination    [] Balance      [] Vision/perception   [x] Sensation     [] Gross Motor Coordination [x] ROM           [x] Pain                        [x] Edema          [x] Scar Adhesion/Skin Integrity      OT PLAN OF CARE   OT POC based on physician orders, patient diagnosis and results of clinical assessment     Frequency/Duration 2-3x a week for 18 visits  Specific OT Treatment to include:      [x] Instruction in HEP                   Modalities:  [x] Therapeutic Exercise                 [x] Ultrasound               [] Electrical Stimulation/Attended  [x] PROM/Stretching                    [x] Fluidotherapy          [x]  Paraffin                   [x] AAROM  [x] AROM                 [] Iontophoresis:   [x] Tendon Glides                                               [x] Neuromuscular Re-Ed progresses. \"     Objective:  Updated POC to be completed by 7-27-22. INTERVENTION: COMPLETED: SPECIFICS/COMMENTS:   Modality:     MH elbow  - 5 min   US x -5 mins - int- 0.8 at 3.3 mhz at 50% over CT area and CMC of thumb . Paraffin bath  x -10 mins    AROM:     Hand/ thumb AROM/ AAROM X          x - thumb AROM all planes  - opposition to index and middle finger with pom poms  -alt opposition with small beads. -bunchems deconstruction and alt prehension  -gather pom poms in L hand then place one at a time. -exercise spheres- 20 reps CCW   Elbow/ Wrist AROM X    X   - table top ball ex for combined flexion/ ext and pronation/supination- 15 reps each  -Wrist AROM all planes  -Ball on wall ex to increase AROM of LUE. - 7 reps      Full arm ROM x - over shld grasp and release cones to simulate getting items in/ out cabinets   AAROM:               PROM/Stretching:     Wrist PROM x - gentle PROM/ less painful   Thumb PROM x - gentle PROM IP/ MP/ flexion/ ABD/ radial ext   Scar Mass/Edema Control:     Scar mgmt x - scar massage to all incisional sites/ most sensitive in the radial thumb        Strengthening:               Other:     L elbow  x -Issued elbow protector to wear at night to protect Ulnar nerve.      -issued scar gel for scar management at base of thumb      Assessment/Comments: Pt is making Fair progress toward stated plan of care. Pt. Tolerated all exercises and stretches, but remains limited by his pain. Pt. Stated he wanted to start walking again, but was concerned it would affect his hand. Therapist stated it would be okay to start walking, but patient may have increased swelling in hand due to the positioning. Will continue to progress as tolerated. Collaborated with OT on current plan of care.        -Rehab Potential: Good  -Requires OT Follow Up: Yes    Time In: 0905         Time Out: 2312           Visit #7    Treatment Charges: Mins Time in - Time out  Units   Modalities  Paraffin/us 10/5 5669-8695  0837-6783 1  0   Ther Exercise 15 2603-2130 1   Manual Therapy 15 2620-3516 1   Thera Activities      ADL/Home Mgt       Neuro Re-education      Gait Training      Group Therapy      Non-Billable Service Time:       Other      Total Time/Units 45  3     -Response to Treatment: Pt reports soreness in the FCR insertion and in the radial wrist by Tx end. Pt was reminded to use ice at home as needed. Goals: Goals for pt can be see on initial eval occurring on 6-27-22    Plan:   [x]  Continue Plan of care: Focus on AROM, prehension and pain management next session. Pt education continues at each visit to obtain maximum benefits from skilled OT intervention.   []  Alter Plan of care:   []  Discharge:      [facility], [facility] /L  [id number]

## 2022-07-18 ENCOUNTER — TREATMENT (OUTPATIENT)
Dept: OCCUPATIONAL THERAPY | Age: 72
End: 2022-07-18
Payer: COMMERCIAL

## 2022-07-18 DIAGNOSIS — G56.02 CARPAL TUNNEL SYNDROME ON LEFT: ICD-10-CM

## 2022-07-18 DIAGNOSIS — G56.22 ULNAR NEUROPATHY OF LEFT UPPER EXTREMITY: Primary | ICD-10-CM

## 2022-07-18 DIAGNOSIS — M65.4 DE QUERVAIN'S TENOSYNOVITIS: ICD-10-CM

## 2022-07-18 PROCEDURE — 97110 THERAPEUTIC EXERCISES: CPT | Performed by: OCCUPATIONAL THERAPIST

## 2022-07-18 PROCEDURE — 97140 MANUAL THERAPY 1/> REGIONS: CPT | Performed by: OCCUPATIONAL THERAPIST

## 2022-07-18 NOTE — PROGRESS NOTES
[address] [facility] [address]  Dept: [address] [place] [address] [address]: [phone]   Radhika Adan OT Fax: [phone]    OCCUPATIONAL THERAPY PROGRESS NOTE    Date:  2022  Initial Evaluation Date: 22    Patient Name:  Cara Castillo. 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, left   G56.02 (ICD-10-CM) - Carpal tunnel syndrome of left wrist   M65.4 (ICD-10-CM) - Tenosynovitis, de Quervain   Date of Surgery/Injury: 3/2/1958     Insurance/Certification information:  Workers Comp ( Claim #: [id number])                             Plan of care signed (Y/N): Y (thru 22)  Visit# / total visits:  ( end date 22)     Referring Practitioner:  Daphne Harding MD  Specific Practitioner Orders: ROM, Scar desensitization and management, modalities PRN     Assessment of current deficits   [] Functional mobility             [x] ADLs          [x] Strength                  [] Cognition   [] Functional transfers           [x] IADLs         [] Safety Awareness  [x] Endurance   [x] Fine Motor Coordination    [] Balance      [] Vision/perception   [x] Sensation     [] Gross Motor Coordination [x] ROM           [x] Pain                        [x] Edema          [x] Scar Adhesion/Skin Integrity      OT PLAN OF CARE   OT POC based on physician orders, patient diagnosis and results of clinical assessment     Frequency/Duration 2-3x a week for 18 visits  Specific OT Treatment to include:      [x] Instruction in HEP                   Modalities:  [x] Therapeutic Exercise                 [x] Ultrasound               [] Electrical Stimulation/Attended  [x] PROM/Stretching                    [x] Fluidotherapy          [x]  Paraffin                   [x] AAROM  [x] AROM                 [] Iontophoresis:   [x] Tendon Glides                                               [x] Neuromuscular Re-Ed [] ADL/IADL re-training    [x] Therapeutic Activity                  [x] Pain Management with/without modalities PRN                 [x] Manual Therapy                      [x] Splinting                                   [x] Scar Management                   []Joint Protection/Training  []Ergonomics                             [x] Joint Mobilization                      [] Adaptive Equipment Assessment/Training                             [x] Manual Edema Mobilization   [] Myofascial Release                 [] Energy Conservation/Work Simplification  [x] GM/FM Coordination                [] Safety retraining/education per  individual diagnosis/goals  [x] Desensitization        Patient Specific Goal: to regain motion at his thumb                             GOALS (Long term same as Short term):  1) Patient will demonstrate good understanding of home program (exercises/activities/diagnosis/prognosis/goals) with good accuracy. 2) Patient will demonstrate increased active/passive range of motion of their  L wrist to [facility] for ADL/IADL completion. 3) Patient will demonstrate increased /pinch strength of at least 10 / 5 pinch pounds of their L hand. 4) Patient to report decreased pain in their affected L  upper extremity from 9/10 to 4/10 or less with resistive functional use. 5) Increase in fine motor function as evidenced by decreased time to complete 9-hole peg test and/or MRMT test by at least 5-10 seconds to increase independence in fine motor dressing tasks (buttons, zippers). 6) Patient will be knowledgeable of edema control techniques as evident with decreases from 19 cm circumferential of L wrist to at least 18-17 cm. 7) Patient will demonstrate a non-tender/non-adherent scar.         Pain Level: 8 on scale of 1-10, aching, throbbing and uncomfortable  In the elbow/ wrist/ thumb    Subjective:  Pt. Stated \"it is coming along\"     Objective:  Updated POC to be completed by 7-27-22. INTERVENTION: COMPLETED: SPECIFICS/COMMENTS:   Modality:     MH elbow  - 5 min   US x -5 mins - int- 0.8 at 3.3 mhz at 50% over CT area and CMC of thumb . Paraffin bath   -10 mins    AROM:     Hand/ thumb AROM/ AAROM X    X  x     - thumb AROM all planes  - opposition to index and middle finger with pom poms  -alt opposition with small beads. -bunchems deconstruction and alt prehension  -gather pom poms in L hand then place one at a time. -exercise spheres- 20 reps CCW   Elbow/ Wrist AROM X    X    x   - table top ball ex for combined flexion/ ext and pronation/supination- 15 reps each  -Wrist AROM all planes  -Ball on wall ex to increase AROM of LUE. - 7 reps   -wristocizer 4 reps, full grasp with use of elbow     Full arm ROM x - over shld grasp and release cones to simulate getting items in/ out cabinets   AAROM:               PROM/Stretching:     Wrist PROM x - gentle PROM/ less painful   Thumb PROM x - gentle PROM IP/ MP/ flexion/ ABD/ radial ext   Scar Mass/Edema Control:     Scar mgmt x - scar massage to all incisional sites/ most sensitive in the radial thumb        Strengthening:               Other:     L elbow  x -Issued elbow protector to wear at night to protect Ulnar nerve.      -issued scar gel for scar management at base of thumb      Assessment/Comments: Pt is making Fair progress toward stated plan of care. Pt tolerated session well with increased completion of fine motor tasks with functional reaching component added to increase AROM tolerance of the elbow flexion and extension. Will progress as tolerated.      -Rehab Potential: Good  -Requires OT Follow Up: Yes    Time In:11:00        Time Out: 11:45         Visit #8    Treatment Charges: Mins Time in - Time out  Units   Modalities  Paraffin/us 0/5 11:50-11:55 0  0   Ther Exercise 25 11:25-11:50 2   Manual Therapy 25 11:00-11:25 2   Thera Activities      ADL/Home Mgt       Neuro Re-education      Gait Training      Group Therapy

## 2022-07-22 ENCOUNTER — TREATMENT (OUTPATIENT)
Dept: OCCUPATIONAL THERAPY | Age: 72
End: 2022-07-22
Payer: COMMERCIAL

## 2022-07-22 DIAGNOSIS — M65.4 DE QUERVAIN'S TENOSYNOVITIS: ICD-10-CM

## 2022-07-22 DIAGNOSIS — G56.22 ULNAR NEUROPATHY OF LEFT UPPER EXTREMITY: Primary | ICD-10-CM

## 2022-07-22 DIAGNOSIS — G56.02 CARPAL TUNNEL SYNDROME ON LEFT: ICD-10-CM

## 2022-07-22 PROCEDURE — 97530 THERAPEUTIC ACTIVITIES: CPT | Performed by: OCCUPATIONAL THERAPY ASSISTANT

## 2022-07-22 PROCEDURE — 97110 THERAPEUTIC EXERCISES: CPT | Performed by: OCCUPATIONAL THERAPY ASSISTANT

## 2022-07-22 PROCEDURE — 97140 MANUAL THERAPY 1/> REGIONS: CPT | Performed by: OCCUPATIONAL THERAPY ASSISTANT

## 2022-07-22 PROCEDURE — 97018 PARAFFIN BATH THERAPY: CPT | Performed by: OCCUPATIONAL THERAPY ASSISTANT

## 2022-07-22 NOTE — PROGRESS NOTES
[address] [address]  Dept: [address] [place] [address] [address]: [phone]   Deidre Sher OT Fax: [phone]    OCCUPATIONAL THERAPY PROGRESS NOTE    Date:  2022  Initial Evaluation Date: 22    Patient Name:  Ray Dill. 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, left   G56.02 (ICD-10-CM) - Carpal tunnel syndrome of left wrist   M65.4 (ICD-10-CM) - Tenosynovitis, de Quervain   Date of Surgery/Injury: 5853     Insurance/Certification information:  Workers Comp ( Claim #: [id number])                             Plan of care signed (Y/N): Y (thru 22)  Visit# / total visits:  ( end date 22)     Referring Practitioner:  Reny Rivero MD  Specific Practitioner Orders: ROM, Scar desensitization and management, modalities PRN     Assessment of current deficits   [] Functional mobility             [x] ADLs          [x] Strength                  [] Cognition   [] Functional transfers           [x] IADLs         [] Safety Awareness  [x] Endurance   [x] Fine Motor Coordination    [] Balance      [] Vision/perception   [x] Sensation     [] Gross Motor Coordination [x] ROM           [x] Pain                        [x] Edema          [x] Scar Adhesion/Skin Integrity      OT PLAN OF CARE   OT POC based on physician orders, patient diagnosis and results of clinical assessment     Frequency/Duration 2-3x a week for 18 visits  Specific OT Treatment to include:      [x] Instruction in HEP                   Modalities:  [x] Therapeutic Exercise                 [x] Ultrasound               [] Electrical Stimulation/Attended  [x] PROM/Stretching                    [x] Fluidotherapy          [x]  Paraffin                   [x] AAROM  [x] AROM                 [] Iontophoresis:   [x] Tendon Glides                                               [x] Neuromuscular Re-Ed [] ADL/IADL re-training    [x] Therapeutic Activity                  [x] Pain Management with/without modalities PRN                 [x] Manual Therapy                      [x] Splinting                                   [x] Scar Management                   []Joint Protection/Training  []Ergonomics                             [x] Joint Mobilization                      [] Adaptive Equipment Assessment/Training                             [x] Manual Edema Mobilization   [] Myofascial Release                 [] Energy Conservation/Work Simplification  [x] GM/FM Coordination                [] Safety retraining/education per  individual diagnosis/goals  [x] Desensitization        Patient Specific Goal: to regain motion at his thumb                             GOALS (Long term same as Short term):  1) Patient will demonstrate good understanding of home program (exercises/activities/diagnosis/prognosis/goals) with good accuracy. 2) Patient will demonstrate increased active/passive range of motion of their  L wrist to [facility] for ADL/IADL completion. 3) Patient will demonstrate increased /pinch strength of at least 10 / 5 pinch pounds of their L hand. 4) Patient to report decreased pain in their affected L  upper extremity from 9/10 to 4/10 or less with resistive functional use. 5) Increase in fine motor function as evidenced by decreased time to complete 9-hole peg test and/or MRMT test by at least 5-10 seconds to increase independence in fine motor dressing tasks (buttons, zippers). 6) Patient will be knowledgeable of edema control techniques as evident with decreases from 19 cm circumferential of L wrist to at least 18-17 cm. 7) Patient will demonstrate a non-tender/non-adherent scar. Pain Level: 8 on scale of 1-10, aching, throbbing and uncomfortable  In the elbow/ wrist/ thumb    Subjective:  Pt. Stated \"I want to start walking again. \"     Objective:  Updated POC to be completed by 7-27-22. INTERVENTION: COMPLETED: SPECIFICS/COMMENTS:   Modality:     MH elbow  - 5 min   US x -5 mins - int- 0.8 at 3.3 mhz at 50% over CT area and CMC of thumb . Paraffin bath   -10 mins    AROM:     Hand/ thumb AROM/ AAROM X          x  X  x - thumb AROM all planes  - opposition to index and middle finger with pom poms  -alt opposition with small beads. -bunchems deconstruction and alt prehension  -gather pom poms in L hand then place one at a time. -exercise spheres- 20 reps CCW  -FM nuts/bolts threading   -rubberband ax- digit extension and abduction    Elbow/ Wrist AROM X    X    X  x   - table top ball ex for combined flexion/ ext and pronation/supination- 15 reps each  -Wrist AROM all planes  -Ball on wall ex to increase AROM of LUE. - 7 reps   -wristocizer 4 reps, full grasp with use of elbow  -UBE- 3 mins forward and 3 mins backwards with BUE's. Full arm ROM x - over shld grasp and release cones to simulate getting items in/ out cabinets   AAROM:               PROM/Stretching:     Wrist PROM x - gentle PROM/ less painful   Thumb PROM x - gentle PROM IP/ MP/ flexion/ ABD/ radial ext   Scar Mass/Edema Control:     Scar mgmt x - scar massage to all incisional sites/ most sensitive in the radial thumb        Strengthening:               Other:     L elbow  x -Issued elbow protector to wear at night to protect Ulnar nerve.      -issued scar gel for scar management at base of thumb      Assessment/Comments: Pt is making Fair progress toward stated plan of care. Pt. Tolerated all exercises and stretches today. Pt. Remains hypersensitive at scar sites with scar massage.  Will re-eval next session.  -Rehab Potential: Good  -Requires OT Follow Up: Yes    Time In: 0905      Time Out: 1000       Visit #9    Treatment Charges: Mins Time in - Time out  Units   Modalities  Paraffin 10 1288-9447 1     Ther Exercise 20 0940-1000 1   Manual Therapy 15 4593-9556 1   Thera Activities 10 2484-0441 1   ADL/Home Mgt Neuro Re-education      Gait Training      Group Therapy      Non-Billable Service Time:       Other      Total Time/Units 55  4     -Response to Treatment: Pt reports soreness in the FCR insertion and in the radial wrist by Tx end. Pt was reminded to use ice at home as needed. Goals: Goals for pt can be see on initial eval occurring on 6-27-22    Plan:   [x]  Continue Plan of care: Focus on AROM, prehension and pain management next session. Pt education continues at each visit to obtain maximum benefits from skilled OT intervention.   []  [facility]   []  Discharge:      [place] Vela/JOELLE [id number]

## 2022-07-26 ENCOUNTER — TREATMENT (OUTPATIENT)
Dept: OCCUPATIONAL THERAPY | Age: 72
End: 2022-07-26
Payer: COMMERCIAL

## 2022-07-26 DIAGNOSIS — G56.02 CARPAL TUNNEL SYNDROME ON LEFT: ICD-10-CM

## 2022-07-26 DIAGNOSIS — S60.222A CONTUSION OF LEFT HAND, INITIAL ENCOUNTER: ICD-10-CM

## 2022-07-26 DIAGNOSIS — G56.22 ULNAR NEUROPATHY OF LEFT UPPER EXTREMITY: Primary | ICD-10-CM

## 2022-07-26 DIAGNOSIS — M65.4 TENOSYNOVITIS, DE QUERVAIN: ICD-10-CM

## 2022-07-26 DIAGNOSIS — M65.4 DE QUERVAIN'S TENOSYNOVITIS: ICD-10-CM

## 2022-07-26 DIAGNOSIS — M18.12 ARTHRITIS OF CARPOMETACARPAL (CMC) JOINT OF LEFT THUMB: ICD-10-CM

## 2022-07-26 PROCEDURE — 97530 THERAPEUTIC ACTIVITIES: CPT | Performed by: OCCUPATIONAL THERAPIST

## 2022-07-26 PROCEDURE — 97140 MANUAL THERAPY 1/> REGIONS: CPT | Performed by: OCCUPATIONAL THERAPIST

## 2022-07-26 PROCEDURE — 97110 THERAPEUTIC EXERCISES: CPT | Performed by: OCCUPATIONAL THERAPIST

## 2022-07-26 PROCEDURE — 97018 PARAFFIN BATH THERAPY: CPT | Performed by: OCCUPATIONAL THERAPIST

## 2022-07-26 NOTE — PROGRESS NOTES
[address] [place] [address]  Dept: [address] [place] [address] [address]: [phone]   [facility] [facility] Fax: [phone]    OCCUPATIONAL THERAPY PROGRESS NOTE    Date:  2022  Initial Evaluation Date: 22    Patient Name:  Jose E Harrison. 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, left   G56.02 (ICD-10-CM) - Carpal tunnel syndrome of left wrist   M65.4 (ICD-10-CM) - Tenosynovitis, de Quervain   Date of Surgery/Injury: 4/3/7150     Insurance/Certification information:  Workers Comp ( Claim #: [id number])                             Plan of care signed (Y/N): Y (thru 22)  Visit# / total visits: 10/ 18 ( end date 22)     Referring Practitioner:  Luz Marina Jacques MD  Specific Practitioner Orders: ROM, Scar desensitization and management, modalities PRN     Assessment of current deficits   [] Functional mobility             [x] ADLs          [x] Strength                  [] Cognition   [] Functional transfers           [x] IADLs         [] Safety Awareness  [x] Endurance   [x] Fine Motor Coordination    [] Balance      [] Vision/perception   [x] Sensation     [] Gross Motor Coordination [x] ROM           [x] Pain                        [x] Edema          [x] Scar Adhesion/Skin Integrity      OT PLAN OF CARE   OT POC based on physician orders, patient diagnosis and results of clinical assessment     Frequency/Duration 2-3x a week for 18 visits  Specific OT Treatment to include:      [x] Instruction in HEP                   Modalities:  [x] Therapeutic Exercise                 [x] Ultrasound               [] Electrical Stimulation/Attended  [x] PROM/Stretching                    [x] Fluidotherapy          [x]  Paraffin                   [x] AAROM  [x] AROM                 [] Iontophoresis:   [x] Tendon Glides                                               [x] Neuromuscular Re-Ed [] ADL/IADL re-training    [x] Therapeutic Activity                  [x] Pain Management with/without modalities PRN                 [x] Manual Therapy                      [x] Splinting                                   [x] Scar Management                   []Joint Protection/Training  []Ergonomics                             [x] Joint Mobilization                      [] Adaptive Equipment Assessment/Training                             [x] Manual Edema Mobilization   [] Myofascial Release                 [] Energy Conservation/Work Simplification  [x] GM/FM Coordination                [] Safety retraining/education per  individual diagnosis/goals  [x] Desensitization        Patient Specific Goal: to regain motion at his thumb                             GOALS (Long term same as Short term):  1) Patient will demonstrate good understanding of home program (exercises/activities/diagnosis/prognosis/goals) with good accuracy. Goal continues to be met with new HEP program  2) Patient will demonstrate increased active/passive range of motion of their  L wrist to [facility] for ADL/IADL completion. Goal progressing, significant gains with wrist ROM flexion and extension as well as opposition. 3) Patient will demonstrate increased /pinch strength of at least 10 / 5 pinch pounds of their L hand. Goal assessed this date  4) Patient to report decreased pain in their affected L  upper extremity from 9/10 to 4/10 or less with resistive functional use. Goal progressing, pain reported consistently 7/10  5) Increase in fine motor function as evidenced by decreased time to complete 9-hole peg test and/or MRMT test by at least 5-10 seconds to increase independence in fine motor dressing tasks (buttons, zippers). Goal assessed this date, reports increased ability to complet fine motor tasks such as buttons for ADL tasks.    6) Patient will be knowledgeable of edema control techniques as evident with decreases from 19 cm circumferential of L wrist to at least 18-17 cm. Goal not assessed this date  7) Patient will demonstrate a non-tender/non-adherent scar. Goal met. Pain Level: 7 on scale of 1-10, aching, throbbing and uncomfortable  In the elbow/ wrist/ thumb    Subjective:  Pt. Stated \"I see the doctor thursday\"     Objective:  Updated POC to be completed by 7-27-22. INTERVENTION: COMPLETED: SPECIFICS/COMMENTS:   Modality:     MH elbow  - 5 min   US  -5 mins - int- 0.8 at 3.3 mhz at 50% over CT area and CMC of thumb . Paraffin bath  x -10 mins    AROM:     Hand/ thumb AROM/ AAROM X  X  X  x           - thumb AROM all planes  - opposition to index and middle finger with pom poms  -alt opposition with small beads. -bunchems deconstruction and alt prehension  -gather pom poms in L hand then place one at a time. -exercise spheres- 20 reps CCW  -FM nuts/bolts threading   -rubberband ax- digit extension and abduction    Elbow/ Wrist AROM     X         - table top ball ex for combined flexion/ ext and pronation/supination- 15 reps each  -Wrist AROM all planes  -Ball on wall ex to increase AROM of LUE. - 7 reps   -wristocizer 4 reps, full grasp with use of elbow  -UBE- 3 mins forward and 3 mins backwards with BUE's. Full arm ROM x - over shld grasp and release cones to simulate getting items in/ out cabinets   AAROM:               PROM/Stretching:     Wrist PROM x - gentle PROM/ less painful   Thumb PROM x - gentle PROM IP/ MP/ flexion/ ABD/ radial ext   Scar Mass/Edema Control:     Scar mgmt x - scar massage to all incisional sites/ most sensitive in the radial thumb        Strengthening:               Other:     L elbow  x -Issued elbow protector to wear at night to protect Ulnar nerve.      -issued scar gel for scar management at base of thumb      Assessment/Comments: Pt is making Fair progress toward stated plan of care. Pt reassessed this date with good gains detailed below.  To increase as tolerated and per

## 2022-07-28 ENCOUNTER — OFFICE VISIT (OUTPATIENT)
Dept: ORTHOPEDIC SURGERY | Age: 72
End: 2022-07-28

## 2022-07-28 VITALS — BODY MASS INDEX: 24.88 KG/M2 | HEIGHT: 69 IN | WEIGHT: 168 LBS

## 2022-07-28 DIAGNOSIS — M65.4 TENOSYNOVITIS, DE QUERVAIN: ICD-10-CM

## 2022-07-28 DIAGNOSIS — G56.22 ULNAR NEUROPATHY AT ELBOW, LEFT: Primary | ICD-10-CM

## 2022-07-28 DIAGNOSIS — G56.02 CARPAL TUNNEL SYNDROME OF LEFT WRIST: ICD-10-CM

## 2022-07-28 PROCEDURE — 99024 POSTOP FOLLOW-UP VISIT: CPT | Performed by: ORTHOPAEDIC SURGERY

## 2022-07-28 RX ORDER — HYDROCODONE BITARTRATE AND ACETAMINOPHEN 5; 325 MG/1; MG/1
1 TABLET ORAL EVERY 6 HOURS PRN
Qty: 28 TABLET | Refills: 0 | Status: SHIPPED | OUTPATIENT
Start: 2022-07-28 | End: 2022-08-04

## 2022-07-28 RX ORDER — GEL PAD/DMC/DIME/DEC/OCT/VIT E 2"X5.5"
1 KIT TOPICAL NIGHTLY
Qty: 1 KIT | Refills: 1 | Status: SHIPPED
Start: 2022-07-28 | End: 2022-10-04

## 2022-07-28 NOTE — PROGRESS NOTES
HPI: Patient presents today 7 weeks s/p left carpal tunnel release, ulnar nerve decompression at the elbow with anterior subcutaneous transposition, left revision de Quervain's tenolysis with radial sensory nerve neurolysis with neuroma excision and nerve. Patient is in therapy. He reports decreased strength and pain at night with difficulty sleeping. Physical Exam: Incisions healing well. No erythema or signs of infection. + scar sensitivity. + pillar pain. Mild stiffness with flexion extension of the fingers and the thumb. Median, ulnar nerves intact light touch. There is diminished sensation to the radial nerve distribution. + wartenbergs. Brisk capillary refill. Assessment:  7 weeks s/p left carpal tunnel release, ulnar nerve decompression at the elbow with anterior subcutaneous transposition, left revision de Quervain's tenolysis with radial sensory nerve neurolysis with neuroma excision and nerve    Plan:   Scar pads rx to the patient. We will ask for a brace to wear while at work for extra protection. Therapy to make thumb spica splint for work. Patient to RTW light duty with brace on. Continue therapy. We will ask for compound cream also to help with nerve pain  Norco rx to patient. Follow up in 4-6 weeks  All questions and concerns answered. I have seen and evaluated the patient and agree with the above assessment and plan on today's visit. I have performed the key components of the history and physical examination with significant findings of postop with continued scar sensitivity status post neuroma excision. Patient requested a refill on pain medicine. He is referred to therapy for desensitization. Recommended nerve pain cream. I concur with the findings and plan as documented. Sajan Dawn MD  7/28/2022    Informed consent was obtained for continued opioid treatment.  Patient agrees to continue the current treatment and agrees the benefits of opioid treatment ( decreased pain, improved function) continue to outweigh the potential risks ( confusion, change in thinking ability, depression, dry mouth, nausea, constipation, vomiting, impaired coordination/balance, sleepiness, damage liver or kidneys, opioid-induced hyperalgesia, physical dependence, addiction, withdrawal, respiratory depression and even death).

## 2022-07-29 ENCOUNTER — TREATMENT (OUTPATIENT)
Dept: OCCUPATIONAL THERAPY | Age: 72
End: 2022-07-29
Payer: COMMERCIAL

## 2022-07-29 DIAGNOSIS — M65.4 TENOSYNOVITIS, DE QUERVAIN: ICD-10-CM

## 2022-07-29 DIAGNOSIS — G56.22 ULNAR NEUROPATHY OF LEFT UPPER EXTREMITY: Primary | ICD-10-CM

## 2022-07-29 DIAGNOSIS — S60.222A CONTUSION OF LEFT HAND, INITIAL ENCOUNTER: ICD-10-CM

## 2022-07-29 DIAGNOSIS — G56.02 CARPAL TUNNEL SYNDROME ON LEFT: ICD-10-CM

## 2022-07-29 DIAGNOSIS — M18.12 ARTHRITIS OF CARPOMETACARPAL (CMC) JOINT OF LEFT THUMB: ICD-10-CM

## 2022-07-29 DIAGNOSIS — M65.4 DE QUERVAIN'S TENOSYNOVITIS: ICD-10-CM

## 2022-07-29 PROCEDURE — 97110 THERAPEUTIC EXERCISES: CPT | Performed by: OCCUPATIONAL THERAPIST

## 2022-07-29 PROCEDURE — 97140 MANUAL THERAPY 1/> REGIONS: CPT | Performed by: OCCUPATIONAL THERAPIST

## 2022-07-29 PROCEDURE — 97018 PARAFFIN BATH THERAPY: CPT | Performed by: OCCUPATIONAL THERAPIST

## 2022-07-29 PROCEDURE — 97530 THERAPEUTIC ACTIVITIES: CPT | Performed by: OCCUPATIONAL THERAPIST

## 2022-07-29 NOTE — PROGRESS NOTES
[address] [place] [address]  Dept: [address] [place] [address] [address]: [phone]   [facility] OT Fax: [phone]    OCCUPATIONAL THERAPY PROGRESS NOTE    Date:  2022  Initial Evaluation Date: 22    Patient Name:  Luis Enrique Romero. 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, left   G56.02 (ICD-10-CM) - Carpal tunnel syndrome of left wrist   M65.4 (ICD-10-CM) - Tenosynovitis, de Quervain   Date of Surgery/Injury: 3/1/0800     Insurance/Certification information:  Workers Comp ( Claim #: [id number])                             Plan of care signed (Y/N): Y (thru 22)  Visit# / total visits:  ( end date 22 extended to -verbally)     Referring Practitioner:  Edel Ponce MD  Specific Practitioner Orders: ROM, Scar desensitization and management, modalities PRN     Assessment of current deficits   [] Functional mobility             [x] ADLs          [x] Strength                  [] Cognition   [] Functional transfers           [x] IADLs         [] Safety Awareness  [x] Endurance   [x] Fine Motor Coordination    [] Balance      [] Vision/perception   [x] Sensation     [] Gross Motor Coordination [x] ROM           [x] Pain                        [x] Edema          [x] Scar Adhesion/Skin Integrity      OT PLAN OF CARE   OT POC based on physician orders, patient diagnosis and results of clinical assessment     Frequency/Duration 2-3x a week for 18 visits  Specific OT Treatment to include:      [x] Instruction in HEP                   Modalities:  [x] Therapeutic Exercise                 [x] Ultrasound               [] Electrical Stimulation/Attended  [x] PROM/Stretching                    [x] Fluidotherapy          [x]  Paraffin                   [x] AAROM  [x] AROM                 [] Iontophoresis:   [x] Tendon Glides [x] Neuromuscular Re-Ed            [] ADL/IADL re-training    [x] Therapeutic Activity                  [x] Pain Management with/without modalities PRN                 [x] Manual Therapy                      [x] Splinting                                   [x] Scar Management                   []Joint Protection/Training  []Ergonomics                             [x] Joint Mobilization                      [] Adaptive Equipment Assessment/Training                             [x] Manual Edema Mobilization   [] Myofascial Release                 [] Energy Conservation/Work Simplification  [x] GM/FM Coordination                [] Safety retraining/education per  individual diagnosis/goals  [x] Desensitization        Patient Specific Goal: to regain motion at his thumb                             GOALS (Long term same as Short term):  1) Patient will demonstrate good understanding of home program (exercises/activities/diagnosis/prognosis/goals) with good accuracy. Goal continues to be met with new HEP program  2) Patient will demonstrate increased active/passive range of motion of their  L wrist to [facility] for ADL/IADL completion. Goal progressing, significant gains with wrist ROM flexion and extension as well as opposition. 3) Patient will demonstrate increased /pinch strength of at least 10 / 5 pinch pounds of their L hand. Goal assessed this date  4) Patient to report decreased pain in their affected L  upper extremity from 9/10 to 4/10 or less with resistive functional use. Goal progressing, pain reported consistently 7/10  5) Increase in fine motor function as evidenced by decreased time to complete 9-hole peg test and/or MRMT test by at least 5-10 seconds to increase independence in fine motor dressing tasks (buttons, zippers). Goal assessed this date, reports increased ability to complet fine motor tasks such as buttons for ADL tasks.    6) Patient will be knowledgeable of edema control techniques as evident with decreases from 19 cm circumferential of L wrist to at least 18-17 cm. Goal not assessed this date  7) Patient will demonstrate a non-tender/non-adherent scar. Goal met. Pain Level: 7 on scale of 1-10, aching, throbbing and uncomfortable  In the elbow/ wrist/ thumb    Subjective:  Pt. Stated \"The doctor was happy just said it was going to take a while for the nerve\"     Objective:  Updated POC to be completed by 7-27-22. INTERVENTION: COMPLETED: SPECIFICS/COMMENTS:   Modality:     MH elbow  - 5 min   US  -5 mins - int- 0.8 at 3.3 mhz at 50% over CT area and CMC of thumb . Paraffin bath  x -10 mins    AROM:     Hand/ thumb AROM/ AAROM X  X  X  x          x - thumb AROM all planes  - opposition to index and middle finger with pom poms  -alt opposition with small beads. -bunchems deconstruction and alt prehension  -gather pom poms in L hand then place one at a time. -exercise spheres- 20 reps CCW  -FM nuts/bolts threading   -rubberband ax- digit extension and abduction   -adduction alternating fingers with pom poms   Elbow/ Wrist AROM     X         - table top ball ex for combined flexion/ ext and pronation/supination- 15 reps each  -Wrist AROM all planes  -Ball on wall ex to increase AROM of LUE. - 7 reps   -wristocizer 4 reps, full grasp with use of elbow  -UBE- 3 mins forward and 3 mins backwards with BUE's.     Full arm ROM x - over shld grasp and release cones to simulate getting items in/ out cabinets   AAROM:               PROM/Stretching:     Wrist PROM x - gentle PROM/ less painful   Thumb PROM x - gentle PROM IP/ MP/ flexion/ ABD/ radial ext   Scar Mass/Edema Control:     Scar mgmt x - scar massage to all incisional sites/ most sensitive in the radial thumb        Strengthening:               Other:     L elbow  x -Issued elbow protector to wear at night to protect Ulnar nerve.      -issued scar gel for scar management at base of thumb      Assessment/Comments: Pt is making Fair progress toward stated plan of care. Pt tolerated session well with primary focus ROM of the thumb and fine motor activities. Decreased pain noted with fine motor tasks with increased tolerance and completion. Will increase as tolerated. -Rehab Potential: Good  -Requires OT Follow Up: Yes    Time In: 9:00     Time Out: 10    Visit #11    Treatment Charges: Mins Time in - Time out  Units   Modalities  Paraffin 10 905-915 1     Ther Exercise 20 940-10 1   Manual Therapy 15 915-930 1   Thera Activities 10 930-940 1   ADL/Home Mgt       Neuro Re-education      Gait Training      Group Therapy      Non-Billable Service Time:       Other      Total Time/Units 55  4     -Response to Treatment: Pt reports soreness in the FCR insertion and in the radial wrist by Tx end. Pt was reminded to use ice at home as needed. Goals: Goals for pt can be see on initial eval occurring on 6-27-22    Plan:   [x]  Continue Plan of care: Focus on AROM, prehension and pain management next session. Pt education continues at each visit to obtain maximum benefits from skilled OT intervention. []  Alter Plan of care:   []  Discharge:       Johnna Isaac, OTR/L #[id number]

## 2022-08-02 ENCOUNTER — TREATMENT (OUTPATIENT)
Dept: OCCUPATIONAL THERAPY | Age: 72
End: 2022-08-02
Payer: COMMERCIAL

## 2022-08-02 DIAGNOSIS — G56.22 ULNAR NEUROPATHY OF LEFT UPPER EXTREMITY: Primary | ICD-10-CM

## 2022-08-02 DIAGNOSIS — M65.4 DE QUERVAIN'S TENOSYNOVITIS: ICD-10-CM

## 2022-08-02 DIAGNOSIS — M18.12 ARTHRITIS OF CARPOMETACARPAL (CMC) JOINT OF LEFT THUMB: ICD-10-CM

## 2022-08-02 PROCEDURE — 97140 MANUAL THERAPY 1/> REGIONS: CPT | Performed by: OCCUPATIONAL THERAPY ASSISTANT

## 2022-08-02 PROCEDURE — 97530 THERAPEUTIC ACTIVITIES: CPT | Performed by: OCCUPATIONAL THERAPY ASSISTANT

## 2022-08-02 PROCEDURE — 97110 THERAPEUTIC EXERCISES: CPT | Performed by: OCCUPATIONAL THERAPY ASSISTANT

## 2022-08-02 PROCEDURE — 97022 WHIRLPOOL THERAPY: CPT | Performed by: OCCUPATIONAL THERAPY ASSISTANT

## 2022-08-02 NOTE — PROGRESS NOTES
[address] [place] [address]  Dept: [address] [place] [address] [address]: [phone]   [facility] OT Fax: [phone]    OCCUPATIONAL THERAPY PROGRESS NOTE    Date:  2022  Initial Evaluation Date: 22    Patient Name:  Andrew Millan. 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, left   G56.02 (ICD-10-CM) - Carpal tunnel syndrome of left wrist   M65.4 (ICD-10-CM) - Tenosynovitis, de Quervain   Date of Surgery/Injury: 5486     Insurance/Certification information:  Workers Comp ( Claim #: [id number])                             Plan of care signed (Y/N): Y (thru 22)  Visit# / total visits:  ( end date 22 extended to -verbally)     Referring Practitioner:  Kristin Merritt MD  Specific Practitioner Orders: ROM, Scar desensitization and management, modalities PRN     Assessment of current deficits   [] Functional mobility             [x] ADLs          [x] Strength                  [] Cognition   [] Functional transfers           [x] IADLs         [] Safety Awareness  [x] Endurance   [x] Fine Motor Coordination    [] Balance      [] Vision/perception   [x] Sensation     [] Gross Motor Coordination [x] ROM           [x] Pain                        [x] Edema          [x] Scar Adhesion/Skin Integrity      OT PLAN OF CARE   OT POC based on physician orders, patient diagnosis and results of clinical assessment     Frequency/Duration 2-3x a week for 18 visits  Specific OT Treatment to include:      [x] Instruction in HEP                   Modalities:  [x] Therapeutic Exercise                 [x] Ultrasound               [] Electrical Stimulation/Attended  [x] PROM/Stretching                    [x] Fluidotherapy          [x]  Paraffin                   [x] AAROM  [x] AROM                 [] Iontophoresis:   [x] Tendon Glides [x] Neuromuscular Re-Ed            [] ADL/IADL re-training    [x] Therapeutic Activity                  [x] Pain Management with/without modalities PRN                 [x] Manual Therapy                      [x] Splinting                                   [x] Scar Management                   []Joint Protection/Training  []Ergonomics                             [x] Joint Mobilization                      [] Adaptive Equipment Assessment/Training                             [x] Manual Edema Mobilization   [] Myofascial Release                 [] Energy Conservation/Work Simplification  [x] GM/FM Coordination                [] Safety retraining/education per  individual diagnosis/goals  [x] Desensitization        Patient Specific Goal: to regain motion at his thumb                             GOALS (Long term same as Short term):  1) Patient will demonstrate good understanding of home program (exercises/activities/diagnosis/prognosis/goals) with good accuracy. Goal continues to be met with new HEP program  2) Patient will demonstrate increased active/passive range of motion of their  L wrist to [facility] for ADL/IADL completion. Goal progressing, significant gains with wrist ROM flexion and extension as well as opposition. 3) Patient will demonstrate increased /pinch strength of at least 10 / 5 pinch pounds of their L hand. Goal assessed this date  4) Patient to report decreased pain in their affected L  upper extremity from 9/10 to 4/10 or less with resistive functional use. Goal progressing, pain reported consistently 7/10  5) Increase in fine motor function as evidenced by decreased time to complete 9-hole peg test and/or MRMT test by at least 5-10 seconds to increase independence in fine motor dressing tasks (buttons, zippers). Goal assessed this date, reports increased ability to complet fine motor tasks such as buttons for ADL tasks.    6) Patient will be knowledgeable of edema control techniques as evident with decreases from 19 cm circumferential of L wrist to at least 18-17 cm. Goal not assessed this date  7) Patient will demonstrate a non-tender/non-adherent scar. Goal met. Pain Level: 7 on scale of 1-10, aching, throbbing and uncomfortable  In the elbow/ wrist/ thumb    Subjective:  Pt. Presented with no new complaints. Objective:  Updated POC to be completed by 7-27-22. INTERVENTION: COMPLETED: SPECIFICS/COMMENTS:   Modality:      elbow  - 5 min   Fluidotherapy x -10 mins for desensitization and pain. US  -5 mins - int- 0.8 at 3.3 mhz at 50% over CT area and CMC of thumb . Paraffin bath   -10 mins    AROM:     Hand/ thumb AROM/ AAROM X  X  X  x      x    x - thumb AROM all planes  - opposition to index and middle finger with pom poms  -alt opposition with small beads. -bunchems deconstruction and alt prehension  -gather pom poms in L hand then place one at a time. -exercise spheres- 20 reps CCW  -FM  small nuts/bolts threading   -rubberband ax- digit extension and abduction   -adduction alternating fingers with pom poms   Elbow/ Wrist AROM     X    x     - table top ball ex for combined flexion/ ext and pronation/supination- 15 reps each  -Wrist AROM all planes  -Ball on wall ex to increase AROM of LUE. - 7 reps   -wristocizer 4 reps, full grasp with use of elbow  -UBE- 3 mins forward and 3 mins backwards with BUE's.     Full arm ROM x - over shld grasp and release cones to simulate getting items in/ out cabinets   AAROM:               PROM/Stretching:     Wrist PROM x - gentle PROM/ less painful   Thumb PROM x - gentle PROM IP/ MP/ flexion/ ABD/ radial ext   Scar Mass/Edema Control:     Scar mgmt x - scar massage to all incisional sites/ most sensitive in the radial thumb        Strengthening:               Other:     L elbow  x -Issued elbow protector to wear at night to protect Ulnar nerve.      -issued scar gel for scar management at base of thumb      Assessment/Comments: Pt is making Fair progress toward stated plan of care. Pt tolerated all exercises and stretches today. Pt. Pain continues to be elevated with all exercises and scar sites. Will continue to progress as tolerated. -Rehab Potential: Good  -Requires OT Follow Up: Yes    Time In: 0905    Time Out: 1000   Visit #12    Treatment Charges: Mins Time in - Time out  Units   Modalities fluido 10 245-916 1     Ther Exercise 20 940-1000 1   Manual Therapy 15 2089-4340 1   Thera Activities 10 1614-3307 1   ADL/Home Mgt       Neuro Re-education      Gait Training      Group Therapy      Non-Billable Service Time:       Other      Total Time/Units 55  4     -Response to Treatment: Pt reports soreness in the FCR insertion and in the radial wrist by Tx end. Pt was reminded to use ice at home as needed. Goals: Goals for pt can be see on initial eval occurring on 6-27-22    Plan:   [x]  Continue Plan of care: Focus on AROM, prehension and pain management next session. Pt education continues at each visit to obtain maximum benefits from skilled OT intervention.   []  [facility] of care:   []  Discharge:      LAWRENCE Weathers/JOELLE [id number]

## 2022-08-04 ENCOUNTER — TREATMENT (OUTPATIENT)
Dept: OCCUPATIONAL THERAPY | Age: 72
End: 2022-08-04
Payer: COMMERCIAL

## 2022-08-04 DIAGNOSIS — M65.4 DE QUERVAIN'S TENOSYNOVITIS: ICD-10-CM

## 2022-08-04 DIAGNOSIS — M18.12 ARTHRITIS OF CARPOMETACARPAL (CMC) JOINT OF LEFT THUMB: ICD-10-CM

## 2022-08-04 DIAGNOSIS — G56.22 ULNAR NEUROPATHY OF LEFT UPPER EXTREMITY: Primary | ICD-10-CM

## 2022-08-04 PROCEDURE — 97022 WHIRLPOOL THERAPY: CPT | Performed by: OCCUPATIONAL THERAPY ASSISTANT

## 2022-08-04 PROCEDURE — 97530 THERAPEUTIC ACTIVITIES: CPT | Performed by: OCCUPATIONAL THERAPY ASSISTANT

## 2022-08-04 PROCEDURE — 97140 MANUAL THERAPY 1/> REGIONS: CPT | Performed by: OCCUPATIONAL THERAPY ASSISTANT

## 2022-08-04 NOTE — PROGRESS NOTES
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, left   G56.02 (ICD-10-CM) - Carpal tunnel syndrome of left wrist   M65.4 (ICD-10-CM) - Tenosynovitis, de Quervain   Date of Surgery/Injury: 8668     Insurance/Certification information:  Workers Comp ( Claim #: [id number])                             Plan of care signed (Y/N): Y (thru 22)  Visit# / total visits:  ( end date 22 extended to -verbally)     Referring Practitioner:  Erika Jose MD  Specific Practitioner Orders: ROM, Scar desensitization and management, modalities PRN     Assessment of current deficits   [] Functional mobility             [x] ADLs          [x] Strength                  [] Cognition   [] Functional transfers           [x] IADLs         [] Safety Awareness  [x] Endurance   [x] Fine Motor Coordination    [] Balance      [] Vision/perception   [x] Sensation     [] Gross Motor Coordination [x] ROM           [x] Pain                        [x] Edema          [x] Scar Adhesion/Skin Integrity      OT PLAN OF CARE   OT POC based on physician orders, patient diagnosis and results of clinical assessment     Frequency/Duration 2-3x a week for 18 visits  Specific OT Treatment to include:      [x] Instruction in HEP                   Modalities:  [x] Therapeutic Exercise                 [x] Ultrasound               [] Electrical Stimulation/Attended  [x] PROM/Stretching                    [x] Fluidotherapy          [x]  Paraffin                   [x] AAROM  [x] AROM                 [] Iontophoresis:   [x] Tendon Glides [x] Neuromuscular Re-Ed            [] ADL/IADL re-training    [x] Therapeutic Activity                  [x] Pain Management with/without modalities PRN                 [x] Manual Therapy                      [x] Splinting                                   [x] Scar Management                   []Joint Protection/Training  []Ergonomics                             [x] Joint Mobilization                      [] Adaptive Equipment Assessment/Training                             [x] Manual Edema Mobilization   [] Myofascial Release                 [] Energy Conservation/Work Simplification  [x] GM/FM Coordination                [] Safety retraining/education per  individual diagnosis/goals  [x] Desensitization        Patient Specific Goal: to regain motion at his thumb                             GOALS (Long term same as Short term):  1) Patient will demonstrate good understanding of home program (exercises/activities/diagnosis/prognosis/goals) with good accuracy. Goal continues to be met with new HEP program  2) Patient will demonstrate increased active/passive range of motion of their  L wrist to [facility] for ADL/IADL completion. Goal progressing, significant gains with wrist ROM flexion and extension as well as opposition. 3) Patient will demonstrate increased /pinch strength of at least 10 / 5 pinch pounds of their L hand. Goal assessed this date  4) Patient to report decreased pain in their affected L  upper extremity from 9/10 to 4/10 or less with resistive functional use. Goal progressing, pain reported consistently 7/10  5) Increase in fine motor function as evidenced by decreased time to complete 9-hole peg test and/or MRMT test by at least 5-10 seconds to increase independence in fine motor dressing tasks (buttons, zippers). Goal assessed this date, reports increased ability to complet fine motor tasks such as buttons for ADL tasks.    6) Patient will be knowledgeable of edema control techniques as evident with decreases from 19 cm circumferential of L wrist to at least 18-17 cm. Goal not assessed this date  7) Patient will demonstrate a non-tender/non-adherent scar. Goal met. Pain Level: 7 on scale of 1-10, aching, throbbing and uncomfortable  In the elbow/ wrist/ thumb    Subjective:  Pt. Presented with no new complaints. Objective:  Updated POC to be completed by 7-27-22. INTERVENTION: COMPLETED: SPECIFICS/COMMENTS:   Modality:      elbow  - 5 min   Fluidotherapy x -10 mins for desensitization and pain. US x -5 mins - int- 0.8 at 3.3 mhz at 50% over CT area and CMC of thumb . Paraffin bath   -10 mins    AROM:     Hand/ thumb AROM/ AAROM X  X  X  x        x - thumb AROM all planes  - opposition to index and middle finger with pom poms  -alt opposition with small beads. -bunchems deconstruction and alt prehension  -gather pom poms in L hand then place one at a time. -exercise spheres- 20 reps CCW  -FM  small nuts/bolts threading   -rubberband ax- digit extension and abduction   -adduction alternating fingers with pom poms   Elbow/ Wrist AROM     X         - table top ball ex for combined flexion/ ext and pronation/supination- 15 reps each  -Wrist AROM all planes  -Ball on wall ex to increase AROM of LUE. - 7 reps   -wristocizer 4 reps, full grasp with use of elbow  -UBE- 3 mins forward and 3 mins backwards with BUE's.     Full arm ROM  - over shld grasp and release cones to simulate getting items in/ out cabinets   AAROM:               PROM/Stretching:     Wrist PROM x - gentle PROM/ less painful   Thumb PROM x - gentle PROM IP/ MP/ flexion/ ABD/ radial ext   Scar Mass/Edema Control:     Scar mgmt x - scar massage to all incisional sites/ most sensitive in the radial thumb        Strengthening:               Other:     L elbow  x -Issued elbow protector to wear at night to protect Ulnar nerve.      -issued scar gel for scar management at base of thumb      Assessment/Comments: Pt is making Fair progress toward stated plan of care. Pt tolerated all exercises and stretches today. Pt. On vacation next week and will continue to perform HEP until his return to therapy.     -Rehab Potential: Good  -Requires OT Follow Up: Yes    Time In: 1005   Time Out: 1050  Visit #13    Treatment Charges: Mins Time in - Time out  Units   Modalities fluido/US 10/5 6875-8805  8710-3630 1     Ther Exercise 5 9418-5332 0   Manual Therapy 15 0191-8666 1   Thera Activities 10 3353-8836 1   ADL/Home Mgt       Neuro Re-education      Gait Training      Group Therapy      Non-Billable Service Time:       Other      Total Time/Units 50  3     -Response to Treatment: Pt reports soreness in the FCR insertion and in the radial wrist by Tx end. Pt was reminded to use ice at home as needed. Goals: Goals for pt can be see on initial eval occurring on 6-27-22    Plan:   [x]  Continue Plan of care: Focus on AROM, prehension and pain management next session. Pt education continues at each visit to obtain maximum benefits from skilled OT intervention.   []  [facility] of care:   []  Discharge:      LAWRENCE Yoo/L [id number]

## 2022-08-16 ENCOUNTER — TREATMENT (OUTPATIENT)
Dept: OCCUPATIONAL THERAPY | Age: 72
End: 2022-08-16
Payer: COMMERCIAL

## 2022-08-16 DIAGNOSIS — G56.22 ULNAR NEUROPATHY OF LEFT UPPER EXTREMITY: Primary | ICD-10-CM

## 2022-08-16 DIAGNOSIS — M65.4 DE QUERVAIN'S TENOSYNOVITIS: ICD-10-CM

## 2022-08-16 DIAGNOSIS — M18.12 ARTHRITIS OF CARPOMETACARPAL (CMC) JOINT OF LEFT THUMB: ICD-10-CM

## 2022-08-16 PROCEDURE — 97140 MANUAL THERAPY 1/> REGIONS: CPT | Performed by: OCCUPATIONAL THERAPY ASSISTANT

## 2022-08-16 PROCEDURE — 97530 THERAPEUTIC ACTIVITIES: CPT | Performed by: OCCUPATIONAL THERAPY ASSISTANT

## 2022-08-16 PROCEDURE — 97110 THERAPEUTIC EXERCISES: CPT | Performed by: OCCUPATIONAL THERAPY ASSISTANT

## 2022-08-16 PROCEDURE — 97022 WHIRLPOOL THERAPY: CPT | Performed by: OCCUPATIONAL THERAPY ASSISTANT

## 2022-08-16 NOTE — PROGRESS NOTES
[address] [address]  Dept: [address] [place] [address] [address]: [phone]   [facility] OT Fax: [phone]    OCCUPATIONAL THERAPY PROGRESS NOTE    Date:  2022  Initial Evaluation Date: 22    Patient Name:  Maxwell Natarajan. 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, left   G56.02 (ICD-10-CM) - Carpal tunnel syndrome of left wrist   M65.4 (ICD-10-CM) - Tenosynovitis, de Quervain   Date of Surgery/Injury:      Insurance/Certification information:  Workers Comp ( Claim #: [id number])                             Plan of care signed (Y/N): Y (thru 22)  Visit# / total visits:  ( end date 22 extended to -verbally)     Referring Practitioner:  Terry Adamson MD  Specific Practitioner Orders: ROM, Scar desensitization and management, modalities PRN     Assessment of current deficits   [] Functional mobility             [x] ADLs          [x] Strength                  [] Cognition   [] Functional transfers           [x] IADLs         [] Safety Awareness  [x] Endurance   [x] Fine Motor Coordination    [] Balance      [] Vision/perception   [x] Sensation     [] Gross Motor Coordination [x] ROM           [x] Pain                        [x] Edema          [x] Scar Adhesion/Skin Integrity      OT PLAN OF CARE   OT POC based on physician orders, patient diagnosis and results of clinical assessment     Frequency/Duration 2-3x a week for 18 visits  Specific OT Treatment to include:      [x] Instruction in HEP                   Modalities:  [x] Therapeutic Exercise                 [x] Ultrasound               [] Electrical Stimulation/Attended  [x] PROM/Stretching                    [x] Fluidotherapy          [x]  Paraffin                   [x] AAROM  [x] AROM                 [] Iontophoresis:   [x] Tendon Glides [x] Neuromuscular Re-Ed            [] ADL/IADL re-training    [x] Therapeutic Activity                  [x] Pain Management with/without modalities PRN                 [x] Manual Therapy                      [x] Splinting                                   [x] Scar Management                   []Joint Protection/Training  []Ergonomics                             [x] Joint Mobilization                      [] Adaptive Equipment Assessment/Training                             [x] Manual Edema Mobilization   [] Myofascial Release                 [] Energy Conservation/Work Simplification  [x] GM/FM Coordination                [] Safety retraining/education per  individual diagnosis/goals  [x] Desensitization        Patient Specific Goal: to regain motion at his thumb                             GOALS (Long term same as Short term): Updated 7/26/22  1) Patient will demonstrate good understanding of home program (exercises/activities/diagnosis/prognosis/goals) with good accuracy. Goal continues to be met with new HEP program  2) Patient will demonstrate increased active/passive range of motion of their  L wrist to [facility] for ADL/IADL completion. Goal progressing, significant gains with wrist ROM flexion and extension as well as opposition. 3) Patient will demonstrate increased /pinch strength of at least 10 / 5 pinch pounds of their L hand. Goal assessed this date  4) Patient to report decreased pain in their affected L  upper extremity from 9/10 to 4/10 or less with resistive functional use. Goal progressing, pain reported consistently 7/10  5) Increase in fine motor function as evidenced by decreased time to complete 9-hole peg test and/or MRMT test by at least 5-10 seconds to increase independence in fine motor dressing tasks (buttons, zippers). Goal assessed this date, reports increased ability to complet fine motor tasks such as buttons for ADL tasks.    6) Patient will be knowledgeable of edema control techniques as evident with decreases from 19 cm circumferential of L wrist to at least 18-17 cm. Goal not assessed this date  7) Patient will demonstrate a non-tender/non-adherent scar. Goal met. Pain Level: 7 on scale of 1-10, aching, throbbing and uncomfortable  In the elbow/ wrist/ thumb    Subjective:  Pt. Stated \"I'm just getting ready to get back to work next week\"     Objective:  Updated POC to be completed by 8-26-22. INTERVENTION: COMPLETED: SPECIFICS/COMMENTS:   Modality:     MH elbow  - 5 min   Fluidotherapy x -10 mins for desensitization and pain. US  -5 mins - int- 0.8 at 3.3 mhz at 50% over CT area and CMC of thumb . Paraffin bath   -10 mins    AROM:     Hand/ thumb AROM/ AAROM X  X  X  x        x - thumb AROM all planes  - opposition to index and middle finger with pom poms  -alt opposition with small beads. -bunchems deconstruction and alt prehension  -gather pom poms in L hand then place one at a time. -exercise spheres- 20 reps CCW  -FM  small nuts/bolts threading   -rubberband ax- digit extension and abduction   -adduction alternating fingers with pom poms   Elbow/ Wrist AROM     X         - table top ball ex for combined flexion/ ext and pronation/supination- 15 reps each  -Wrist AROM all planes  -Ball on wall ex to increase AROM of LUE. - 7 reps   -wristocizer 4 reps, full grasp with use of elbow  -UBE- 3 mins forward and 3 mins backwards with BUE's.     Full arm ROM  - over shld grasp and release cones to simulate getting items in/ out cabinets   AAROM:               PROM/Stretching:     Wrist PROM x - gentle PROM/ less painful   Thumb PROM x - gentle PROM IP/ MP/ flexion/ ABD/ radial ext   Scar Mass/Edema Control:     Scar mgmt x - scar massage to all incisional sites/ most sensitive in the radial thumb        Strengthening:               Other:     L elbow  x -Issued elbow protector to wear at night to protect Ulnar nerve.      -issued scar gel for scar management at base of thumb      Assessment/Comments: Pt is making Fair progress toward stated plan of care. Pt tolerated all exercises and stretches today. Pt. Elias Vargas to have increased pain in the mornings and lessens as the day goes on. 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     -Rehab Potential: Good  -Requires OT Follow Up: Yes    Time In: 1100   Time Out: 1155  Visit #14    Treatment Charges: Mins Time in - Time out  Units   Modalities fluido 10 8320-8698 1     Ther Exercise 15 5657-3235 1   Manual Therapy 15 3022-4132 1   Thera Activities 15 6286-6953 1   ADL/Home Mgt       Neuro Re-education      Gait Training      Group Therapy      Non-Billable Service Time:       Other      Total Time/Units 55  4     -Response to Treatment: Pt reports soreness in the FCR insertion and in the radial wrist by Tx end. Pt was reminded to use ice at home as needed. Goals: Goals for pt can be see on initial eval occurring on 6-27-22    Plan:   [x]  Continue Plan of care: Focus on AROM, prehension and pain management next session. Pt education continues at each visit to obtain maximum benefits from skilled OT intervention.   []  [facility] of care:   []  Discharge:      LAWRENCE Yoo/JOELLE [id number]

## 2022-08-18 ENCOUNTER — TREATMENT (OUTPATIENT)
Dept: OCCUPATIONAL THERAPY | Age: 72
End: 2022-08-18
Payer: COMMERCIAL

## 2022-08-18 DIAGNOSIS — M18.12 ARTHRITIS OF CARPOMETACARPAL (CMC) JOINT OF LEFT THUMB: ICD-10-CM

## 2022-08-18 DIAGNOSIS — M65.4 DE QUERVAIN'S TENOSYNOVITIS: ICD-10-CM

## 2022-08-18 DIAGNOSIS — G56.02 CARPAL TUNNEL SYNDROME ON LEFT: ICD-10-CM

## 2022-08-18 DIAGNOSIS — G56.22 ULNAR NEUROPATHY OF LEFT UPPER EXTREMITY: Primary | ICD-10-CM

## 2022-08-18 PROCEDURE — 97140 MANUAL THERAPY 1/> REGIONS: CPT | Performed by: OCCUPATIONAL THERAPY ASSISTANT

## 2022-08-18 PROCEDURE — 97018 PARAFFIN BATH THERAPY: CPT | Performed by: OCCUPATIONAL THERAPY ASSISTANT

## 2022-08-18 PROCEDURE — 97110 THERAPEUTIC EXERCISES: CPT | Performed by: OCCUPATIONAL THERAPY ASSISTANT

## 2022-08-18 NOTE — PROGRESS NOTES
[address] [address]  Dept: [address] [place] [address] [address]: [phone]   [facility] OT Fax: [phone]    OCCUPATIONAL THERAPY PROGRESS NOTE    Date:  2022  Initial Evaluation Date: 22    Patient Name:  Jacob Ford. 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, left   G56.02 (ICD-10-CM) - Carpal tunnel syndrome of left wrist   M65.4 (ICD-10-CM) - Tenosynovitis, de Quervain   Date of Surgery/Injury: 3/1/4325     Insurance/Certification information:  Workers Comp ( Claim #: [id number])                             Plan of care signed (Y/N): Y (thru 22)  Visit# / total visits: 15/ 18 ( end date 22 extended to -verbally)     Referring Practitioner:  Marylou Duarte MD  Specific Practitioner Orders: ROM, Scar desensitization and management, modalities PRN     Assessment of current deficits   [] Functional mobility             [x] ADLs          [x] Strength                  [] Cognition   [] Functional transfers           [x] IADLs         [] Safety Awareness  [x] Endurance   [x] Fine Motor Coordination    [] Balance      [] Vision/perception   [x] Sensation     [] Gross Motor Coordination [x] ROM           [x] Pain                        [x] Edema          [x] Scar Adhesion/Skin Integrity      OT PLAN OF CARE   OT POC based on physician orders, patient diagnosis and results of clinical assessment     Frequency/Duration 2-3x a week for 18 visits  Specific OT Treatment to include:      [x] Instruction in HEP                   Modalities:  [x] Therapeutic Exercise                 [x] Ultrasound               [] Electrical Stimulation/Attended  [x] PROM/Stretching                    [x] Fluidotherapy          [x]  Paraffin                   [x] AAROM  [x] AROM                 [] Iontophoresis:   [x] Tendon Glides [x] Neuromuscular Re-Ed            [] ADL/IADL re-training    [x] Therapeutic Activity                  [x] Pain Management with/without modalities PRN                 [x] Manual Therapy                      [x] Splinting                                   [x] Scar Management                   []Joint Protection/Training  []Ergonomics                             [x] Joint Mobilization                      [] Adaptive Equipment Assessment/Training                             [x] Manual Edema Mobilization   [] Myofascial Release                 [] Energy Conservation/Work Simplification  [x] GM/FM Coordination                [] Safety retraining/education per  individual diagnosis/goals  [x] Desensitization        Patient Specific Goal: to regain motion at his thumb                             GOALS (Long term same as Short term): Updated 7/26/22  1) Patient will demonstrate good understanding of home program (exercises/activities/diagnosis/prognosis/goals) with good accuracy. Goal continues to be met with new HEP program  2) Patient will demonstrate increased active/passive range of motion of their  L wrist to [facility] for ADL/IADL completion. Goal progressing, significant gains with wrist ROM flexion and extension as well as opposition. 3) Patient will demonstrate increased /pinch strength of at least 10 / 5 pinch pounds of their L hand. Goal assessed this date  4) Patient to report decreased pain in their affected L  upper extremity from 9/10 to 4/10 or less with resistive functional use. Goal progressing, pain reported consistently 7/10  5) Increase in fine motor function as evidenced by decreased time to complete 9-hole peg test and/or MRMT test by at least 5-10 seconds to increase independence in fine motor dressing tasks (buttons, zippers). Goal assessed this date, reports increased ability to complet fine motor tasks such as buttons for ADL tasks.    6) Patient will be knowledgeable of edema control techniques as evident with decreases from 19 cm circumferential of L wrist to at least 18-17 cm. Goal not assessed this date  7) Patient will demonstrate a non-tender/non-adherent scar. Goal met. Pain Level: 7 on scale of 1-10, aching, throbbing and uncomfortable  In the elbow/ wrist/ thumb    Subjective:  Pt. Presented with no new complaints. Objective:  Updated POC to be completed by 8-26-22. INTERVENTION: COMPLETED: SPECIFICS/COMMENTS:   Modality:      elbow  - 5 min   Fluidotherapy  -10 mins for desensitization and pain. US  -5 mins - int- 0.8 at 3.3 mhz at 50% over CT area and CMC of thumb . Paraffin bath  x -10 mins    AROM:     Hand/ thumb AROM/ AAROM X  X        X        x - thumb AROM all planes  - opposition to index and middle finger with pom poms  -alt opposition with small beads. -bunchems deconstruction and alt prehension  -gather pom poms in L hand then place one at a time. -exercise spheres- 20 reps CCW  -FM  small nuts/bolts threading   -rubberband ax- digit extension and abduction   -adduction alternating fingers with pom poms  -coin manipulation- 3 coins at a time. Elbow/ Wrist AROM     X         - table top ball ex for combined flexion/ ext and pronation/supination- 15 reps each  -Wrist AROM all planes  -Ball on wall ex to increase AROM of LUE. - 7 reps   -wristocizer 4 reps, full grasp with use of elbow  -UBE- 3 mins forward and 3 mins backwards with BUE's.     Full arm ROM  - over shld grasp and release cones to simulate getting items in/ out cabinets   AAROM:               PROM/Stretching:     Wrist PROM x - gentle PROM/ less painful   Thumb PROM x - gentle PROM IP/ MP/ flexion/ ABD/ radial ext   Scar Mass/Edema Control:     Scar mgmt x - scar massage to all incisional sites/ most sensitive in the radial thumb        Strengthening:     L hand strengthening  x -Beige (x-soft) theraputty ex's- - 20 reps and roll/pinch- 3 reps with palmar 3 point pinch

## 2022-08-24 ENCOUNTER — TREATMENT (OUTPATIENT)
Dept: OCCUPATIONAL THERAPY | Age: 72
End: 2022-08-24
Payer: COMMERCIAL

## 2022-08-24 DIAGNOSIS — M65.4 DE QUERVAIN'S TENOSYNOVITIS: ICD-10-CM

## 2022-08-24 DIAGNOSIS — G56.22 ULNAR NEUROPATHY OF LEFT UPPER EXTREMITY: Primary | ICD-10-CM

## 2022-08-24 DIAGNOSIS — G56.02 CARPAL TUNNEL SYNDROME ON LEFT: ICD-10-CM

## 2022-08-24 PROCEDURE — 97110 THERAPEUTIC EXERCISES: CPT | Performed by: OCCUPATIONAL THERAPY ASSISTANT

## 2022-08-24 PROCEDURE — 97530 THERAPEUTIC ACTIVITIES: CPT | Performed by: OCCUPATIONAL THERAPY ASSISTANT

## 2022-08-24 PROCEDURE — 97140 MANUAL THERAPY 1/> REGIONS: CPT | Performed by: OCCUPATIONAL THERAPY ASSISTANT

## 2022-08-24 PROCEDURE — 97022 WHIRLPOOL THERAPY: CPT | Performed by: OCCUPATIONAL THERAPY ASSISTANT

## 2022-08-24 NOTE — PROGRESS NOTES
[address] [address]  Dept: [address] [place] [address] [address]: [phone]   Sallie Davalos OT Fax: [phone]    OCCUPATIONAL THERAPY PROGRESS NOTE    Date:  2022  Initial Evaluation Date: 22    Patient Name:  Maxwell Natarajan. 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, left   G56.02 (ICD-10-CM) - Carpal tunnel syndrome of left wrist   M65.4 (ICD-10-CM) - Tenosynovitis, de Quervain   Date of Surgery/Injury: 8764     Insurance/Certification information:  Workers Comp ( Claim #: [id number])                             Plan of care signed (Y/N): Y (thru 22)  Visit# / total visits:  ( end date 22 extended to -verbally)     Referring Practitioner:  Terry Adamson MD  Specific Practitioner Orders: ROM, Scar desensitization and management, modalities PRN     Assessment of current deficits   [] Functional mobility             [x] ADLs          [x] Strength                  [] Cognition   [] Functional transfers           [x] IADLs         [] Safety Awareness  [x] Endurance   [x] Fine Motor Coordination    [] Balance      [] Vision/perception   [x] Sensation     [] Gross Motor Coordination [x] ROM           [x] Pain                        [x] Edema          [x] Scar Adhesion/Skin Integrity      OT PLAN OF CARE   OT POC based on physician orders, patient diagnosis and results of clinical assessment     Frequency/Duration 2-3x a week for 18 visits  Specific OT Treatment to include:      [x] Instruction in HEP                   Modalities:  [x] Therapeutic Exercise                 [x] Ultrasound               [] Electrical Stimulation/Attended  [x] PROM/Stretching                    [x] Fluidotherapy          [x]  Paraffin                   [x] AAROM  [x] AROM                 [] Iontophoresis:   [x] Tendon Glides [x] Neuromuscular Re-Ed            [] ADL/IADL re-training    [x] Therapeutic Activity                  [x] Pain Management with/without modalities PRN                 [x] Manual Therapy                      [x] Splinting                                   [x] Scar Management                   []Joint Protection/Training  []Ergonomics                             [x] Joint Mobilization                      [] Adaptive Equipment Assessment/Training                             [x] Manual Edema Mobilization   [] Myofascial Release                 [] Energy Conservation/Work Simplification  [x] GM/FM Coordination                [] Safety retraining/education per  individual diagnosis/goals  [x] Desensitization        Patient Specific Goal: to regain motion at his thumb                             GOALS (Long term same as Short term): Updated 7/26/22  1) Patient will demonstrate good understanding of home program (exercises/activities/diagnosis/prognosis/goals) with good accuracy. Goal continues to be met with new HEP program  2) Patient will demonstrate increased active/passive range of motion of their  L wrist to [facility] for ADL/IADL completion. Goal progressing, significant gains with wrist ROM flexion and extension as well as opposition. 3) Patient will demonstrate increased /pinch strength of at least 10 / 5 pinch pounds of their L hand. Goal assessed this date  4) Patient to report decreased pain in their affected L  upper extremity from 9/10 to 4/10 or less with resistive functional use. Goal progressing, pain reported consistently 7/10  5) Increase in fine motor function as evidenced by decreased time to complete 9-hole peg test and/or MRMT test by at least 5-10 seconds to increase independence in fine motor dressing tasks (buttons, zippers). Goal assessed this date, reports increased ability to complet fine motor tasks such as buttons for ADL tasks.    6) Patient will be knowledgeable of edema control techniques as evident with decreases from 19 cm circumferential of L wrist to at least 18-17 cm. Goal not assessed this date  7) Patient will demonstrate a non-tender/non-adherent scar. Goal met. Pain Level: 7 on scale of 1-10, aching, throbbing and uncomfortable  In the elbow/ wrist/ thumb    Subjective:  Pt. Stated \"I started back at school on Monday\"     Objective:  Updated POC to be completed by 8-26-22. INTERVENTION: COMPLETED: SPECIFICS/COMMENTS:   Modality:     MH elbow  - 5 min   Fluidotherapy x -10 mins for desensitization and pain. US  -5 mins - int- 0.8 at 3.3 mhz at 50% over CT area and CMC of thumb . Paraffin bath   -10 mins    AROM:     Hand/ thumb AROM/ AAROM X  X  x      X         - thumb AROM all planes  - opposition to index and middle finger with pom poms  -alt opposition with small beads. -bunchems deconstruction and alt prehension  -gather pom poms in L hand then place one at a time. -exercise spheres- 20 reps CCW  -FM  small nuts/bolts threading   -rubberband ax- digit extension and abduction   -adduction alternating fingers with pom poms  -coin manipulation- 3 coins at a time. Elbow/ Wrist AROM     X    x     - table top ball ex for combined flexion/ ext and pronation/supination- 15 reps each  -Wrist AROM all planes  -Ball on wall ex to increase AROM of LUE. - 7 reps   -wristocizer 4 reps, full grasp with use of elbow  -UBE- 3 mins forward and 3 mins backwards with BUE's.     Full arm ROM  - over shld grasp and release cones to simulate getting items in/ out cabinets   AAROM:               PROM/Stretching:     Wrist PROM x - gentle PROM/ less painful   Thumb PROM x - gentle PROM IP/ MP/ flexion/ ABD/ radial ext   Scar Mass/Edema Control:     Scar mgmt x - scar massage to all incisional sites/ most sensitive in the radial thumb        Strengthening:     L hand strengthening   -Beige (x-soft) theraputty ex's- - 20 reps and roll/pinch- 3 reps with palmar 3 point pinch only. LUE strengthening  X  x -PRE's 1# wt - wrist flexion/ext and UD/RD- 10 reps each.  -Lift and place two 1# wt's and 2.2# medicine ball- 5 reps    Other:     L elbow  x -Issued elbow protector to wear at night to protect Ulnar nerve.      -issued scar gel for scar management at base of thumb      Assessment/Comments: Pt is making Fair progress toward stated plan of care. Pt tolerated all new resistive wrist exercises and stretches today. Pt. Is tolerating light strengthening, but has mild increase in pain in L thumb. Will continue to progress as tolerated. -Rehab Potential: Good  -Requires OT Follow Up: Yes    Time In: 0900 Time Out: 4901 Visit #16    Treatment Charges: Mins Time in - Time out  Units   Modalities fluido 10 6454-7971 1     Ther Exercise 20 1560-9019 1   Manual Therapy 10 0910-0920 1   Thera Activities 15 7723-6104 1   ADL/Home Mgt       Neuro Re-education      Gait Training      Group Therapy      Non-Billable Service Time:       Other      Total Time/Units 55  4     -Response to Treatment: Pt reports soreness in the FCR insertion and in the radial wrist by Tx end. Pt was reminded to use ice at home as needed. Goals: Goals for pt can be see on initial eval occurring on 6-27-22    Plan:   [x]  Continue Plan of care: Focus on AROM, prehension, light strengthening and pain management next session. Pt education continues at each visit to obtain maximum benefits from skilled OT intervention.   []  [facility] of care:   []  Discharge:      LAWRENCE Garza/JOELLE [id number]

## 2022-08-25 RX ORDER — TRAMADOL HYDROCHLORIDE 50 MG/1
50 TABLET ORAL EVERY 6 HOURS PRN
Qty: 12 TABLET | Refills: 0 | OUTPATIENT
Start: 2022-08-25 | End: 2022-09-24

## 2022-08-25 NOTE — TELEPHONE ENCOUNTER
Wants a refill on his tramadol he is going to follow with a different doctor but needs refill on his medications until seen

## 2022-08-26 ENCOUNTER — TREATMENT (OUTPATIENT)
Dept: OCCUPATIONAL THERAPY | Age: 72
End: 2022-08-26
Payer: COMMERCIAL

## 2022-08-26 DIAGNOSIS — G56.22 ULNAR NEUROPATHY OF LEFT UPPER EXTREMITY: Primary | ICD-10-CM

## 2022-08-26 DIAGNOSIS — M65.4 DE QUERVAIN'S TENOSYNOVITIS: ICD-10-CM

## 2022-08-26 DIAGNOSIS — G56.02 CARPAL TUNNEL SYNDROME ON LEFT: ICD-10-CM

## 2022-08-26 PROCEDURE — 97110 THERAPEUTIC EXERCISES: CPT | Performed by: OCCUPATIONAL THERAPY ASSISTANT

## 2022-08-26 PROCEDURE — 97140 MANUAL THERAPY 1/> REGIONS: CPT | Performed by: OCCUPATIONAL THERAPY ASSISTANT

## 2022-08-26 PROCEDURE — 97022 WHIRLPOOL THERAPY: CPT | Performed by: OCCUPATIONAL THERAPY ASSISTANT

## 2022-08-26 PROCEDURE — 97530 THERAPEUTIC ACTIVITIES: CPT | Performed by: OCCUPATIONAL THERAPY ASSISTANT

## 2022-08-26 NOTE — PROGRESS NOTES
[address] [address]  Dept: [address] [place] [address] [address]: [phone]   Lavinia Corley OT Fax: [phone]    OCCUPATIONAL THERAPY PROGRESS NOTE    Date:  2022  Initial Evaluation Date: 22    Patient Name:  Jacob Ford. 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, left   G56.02 (ICD-10-CM) - Carpal tunnel syndrome of left wrist   M65.4 (ICD-10-CM) - Tenosynovitis, de Quervain   Date of Surgery/Injury: 9088     Insurance/Certification information:  Workers Comp ( Claim #: [id number])                             Plan of care signed (Y/N): Y (thru 22)  Visit# / total visits:  ( end date 22 extended to -verbally)     Referring Practitioner:  Marylou Duarte MD  Specific Practitioner Orders: ROM, Scar desensitization and management, modalities PRN     Assessment of current deficits   [] Functional mobility             [x] ADLs          [x] Strength                  [] Cognition   [] Functional transfers           [x] IADLs         [] Safety Awareness  [x] Endurance   [x] Fine Motor Coordination    [] Balance      [] Vision/perception   [x] Sensation     [] Gross Motor Coordination [x] ROM           [x] Pain                        [x] Edema          [x] Scar Adhesion/Skin Integrity      OT PLAN OF CARE   OT POC based on physician orders, patient diagnosis and results of clinical assessment     Frequency/Duration 2-3x a week for 18 visits  Specific OT Treatment to include:      [x] Instruction in HEP                   Modalities:  [x] Therapeutic Exercise                 [x] Ultrasound               [] Electrical Stimulation/Attended  [x] PROM/Stretching                    [x] Fluidotherapy          [x]  Paraffin                   [x] AAROM  [x] AROM                 [] Iontophoresis:   [x] Tendon Glides [x] Neuromuscular Re-Ed            [] ADL/IADL re-training    [x] Therapeutic Activity                  [x] Pain Management with/without modalities PRN                 [x] Manual Therapy                      [x] Splinting                                   [x] Scar Management                   []Joint Protection/Training  []Ergonomics                             [x] Joint Mobilization                      [] Adaptive Equipment Assessment/Training                             [x] Manual Edema Mobilization   [] Myofascial Release                 [] Energy Conservation/Work Simplification  [x] GM/FM Coordination                [] Safety retraining/education per  individual diagnosis/goals  [x] Desensitization        Patient Specific Goal: to regain motion at his thumb                             GOALS (Long term same as Short term): Updated 7/26/22  1) Patient will demonstrate good understanding of home program (exercises/activities/diagnosis/prognosis/goals) with good accuracy. Goal continues to be met with new HEP program  2) Patient will demonstrate increased active/passive range of motion of their  L wrist to [facility] for ADL/IADL completion. Goal progressing, significant gains with wrist ROM flexion and extension as well as opposition. 3) Patient will demonstrate increased /pinch strength of at least 10 / 5 pinch pounds of their L hand. Goal assessed this date  4) Patient to report decreased pain in their affected L  upper extremity from 9/10 to 4/10 or less with resistive functional use. Goal progressing, pain reported consistently 7/10  5) Increase in fine motor function as evidenced by decreased time to complete 9-hole peg test and/or MRMT test by at least 5-10 seconds to increase independence in fine motor dressing tasks (buttons, zippers). Goal assessed this date, reports increased ability to complet fine motor tasks such as buttons for ADL tasks.    6) Patient will be knowledgeable of edema control techniques as evident with decreases from 19 cm circumferential of L wrist to at least 18-17 cm. Goal not assessed this date  7) Patient will demonstrate a non-tender/non-adherent scar. Goal met. Pain Level: 7-8  on scale of 1-10, aching, throbbing and uncomfortable  In the elbow/ wrist/ thumb    Subjective:  Pt. Stated \"I still have more pain in the morning after sleeping\"    Objective:  Updated POC to be completed by 8-26-22. INTERVENTION: COMPLETED: SPECIFICS/COMMENTS:   Modality:     MH elbow  - 5 min   Fluidotherapy  -10 mins for desensitization and pain. US  -5 mins - int- 0.8 at 3.3 mhz at 50% over CT area and CMC of thumb . Paraffin bath  x -10 mins    AROM:     Hand/ thumb AROM/ AAROM X  X            x   - thumb AROM all planes  - opposition to index and middle finger with pom poms  -alt opposition with small beads. -bunchems deconstruction and alt prehension  -gather pom poms in L hand then place one at a time. -exercise spheres- 20 reps CCW  -FM  small nuts/bolts threading   -rubberband ax- digit extension and abduction   -adduction alternating fingers with pom poms  -coin manipulation- 3 coins at a time. Elbow/ Wrist AROM     X    x     - table top ball ex for combined flexion/ ext and pronation/supination- 15 reps each  -Wrist AROM all planes  -Ball on wall ex to increase AROM of LUE. - 7 reps   -wristocizer 4 reps, full grasp with use of elbow  -UBE- 3 mins forward and 3 mins backwards with BUE's.     Full arm ROM  - over shld grasp and release cones to simulate getting items in/ out cabinets   AAROM:               PROM/Stretching:     Wrist PROM x - gentle PROM/ less painful   Thumb PROM x - gentle PROM IP/ MP/ flexion/ ABD/ radial ext   Scar Mass/Edema Control:     Scar mgmt x - scar massage to all incisional sites/ most sensitive in the radial thumb        Strengthening:     L hand strengthening   -Beige (x-soft) theraputty ex's- - 20 reps and roll/pinch- 3 reps with palmar 3 point pinch only. LUE strengthening  X  X  x -PRE's 1# wt - wrist flexion/ext and UD/RD- 10 reps each.  -Lift and place two 1# wt's and 2.2# medicine ball- 5 reps   -1# wt'd dowel- pronation/supination and twists- 10 reps each   Other:     L elbow  x -Issued elbow protector to wear at night to protect Ulnar nerve.      -issued scar gel for scar management at base of thumb      Assessment/Comments: Pt is making Fair progress toward stated plan of care. Pt tolerated all new resistive wrist exercises and stretches today. Pt. To wear brace again when sleeping only to see if it helps decrease his pain. Will re-eval next session.     -Rehab Potential: Good  -Requires OT Follow Up: Yes    Time In: 0900 Time Out: 0955 Visit #17    Treatment Charges: Mins Time in - Time out  Units   Modalities paraffin 10 8116-5633 1     Ther Exercise 20 5020-9919 1   Manual Therapy 10 0910-0920 1   Thera Activities 15 5017-5962 1   ADL/Home Mgt       Neuro Re-education      Gait Training      Group Therapy      Non-Billable Service Time:       Other      Total Time/Units 55  4     -Response to Treatment: Pt reports soreness in the FCR insertion and in the radial wrist by Tx end. Pt was reminded to use ice at home as needed. Goals: Goals for pt can be see on initial eval occurring on 6-27-22    Plan:   [x]  Continue Plan of care: Focus on AROM, prehension, light strengthening and pain management next session. Pt education continues at each visit to obtain maximum benefits from skilled OT intervention.   []  [facility] of care:   []  Discharge:      LAWRENCE Damian/JOELLE [id number]

## 2022-09-01 ENCOUNTER — TREATMENT (OUTPATIENT)
Dept: OCCUPATIONAL THERAPY | Age: 72
End: 2022-09-01
Payer: COMMERCIAL

## 2022-09-01 DIAGNOSIS — M65.4 DE QUERVAIN'S TENOSYNOVITIS: ICD-10-CM

## 2022-09-01 DIAGNOSIS — G56.22 ULNAR NEUROPATHY OF LEFT UPPER EXTREMITY: Primary | ICD-10-CM

## 2022-09-01 DIAGNOSIS — G56.02 CARPAL TUNNEL SYNDROME ON LEFT: ICD-10-CM

## 2022-09-01 PROCEDURE — 97530 THERAPEUTIC ACTIVITIES: CPT | Performed by: OCCUPATIONAL THERAPY ASSISTANT

## 2022-09-01 PROCEDURE — 97140 MANUAL THERAPY 1/> REGIONS: CPT | Performed by: OCCUPATIONAL THERAPY ASSISTANT

## 2022-09-01 PROCEDURE — 97022 WHIRLPOOL THERAPY: CPT | Performed by: OCCUPATIONAL THERAPY ASSISTANT

## 2022-09-01 NOTE — PROGRESS NOTES
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, left   G56.02 (ICD-10-CM) - Carpal tunnel syndrome of left wrist   M65.4 (ICD-10-CM) - Tenosynovitis, de Quervain   Date of Surgery/Injury: 2326     Insurance/Certification information:  Workers Comp ( Claim #: [id number])                             Plan of care signed (Y/N): Y (thru 22)  Visit# / total visits:  ( end date 22 extended to -verbally)     Referring Practitioner:  Mark Tolentino MD  Specific Practitioner Orders: ROM, Scar desensitization and management, modalities PRN     Assessment of current deficits   [] Functional mobility             [x] ADLs          [x] Strength                  [] Cognition   [] Functional transfers           [x] IADLs         [] Safety Awareness  [x] Endurance   [x] Fine Motor Coordination    [] Balance      [] Vision/perception   [x] Sensation     [] Gross Motor Coordination [x] ROM           [x] Pain                        [x] Edema          [x] Scar Adhesion/Skin Integrity      OT PLAN OF CARE   OT POC based on physician orders, patient diagnosis and results of clinical assessment     Frequency/Duration 2-3x a week for 18 visits  Specific OT Treatment to include:      [x] Instruction in HEP                   Modalities:  [x] Therapeutic Exercise                 [x] Ultrasound               [] Electrical Stimulation/Attended  [x] PROM/Stretching                    [x] Fluidotherapy          [x]  Paraffin                   [x] AAROM  [x] AROM                 [] Iontophoresis:   [x] Tendon Glides [x] Neuromuscular Re-Ed            [] ADL/IADL re-training    [x] Therapeutic Activity                  [x] Pain Management with/without modalities PRN                 [x] Manual Therapy                      [x] Splinting                                   [x] Scar Management                   []Joint Protection/Training  []Ergonomics                             [x] Joint Mobilization                      [] Adaptive Equipment Assessment/Training                             [x] Manual Edema Mobilization   [] Myofascial Release                 [] Energy Conservation/Work Simplification  [x] GM/FM Coordination                [] Safety retraining/education per  individual diagnosis/goals  [x] Desensitization        Patient Specific Goal: to regain motion at his thumb                             GOALS (Long term same as Short term): Updated 9/1/22  1) Patient will demonstrate good understanding of home program (exercises/activities/diagnosis/prognosis/goals) with good accuracy. Goal Met. Pt. Completes HEP daily. 2) Patient will demonstrate increased active/passive range of motion of their  L wrist to [facility] for ADL/IADL completion. Goal progressing, significant gains with wrist ROM flexion and extension as well as opposition. 3) Patient will demonstrate increased /pinch strength of at least 10 / 5 pinch pounds of their L hand. Slow progress toward goal.  remained unchanged at 15#. Lateral pinch increased from 4# to 5# and palmar 3 point increased from 2# to 4# today. 4) Patient to report decreased pain in their affected L  upper extremity from 9/10 to 4/10 or less with resistive functional use. Goal progressing, pain reported between 5-8 depending on time of day and what activity was done throughout the day.   5) Increase in fine motor function as evidenced by decreased time to complete 9-hole peg test and/or MRMT test by at least 5-10 seconds to increase independence in fine motor dressing tasks (buttons, zippers). Slow progress towards goal. Nine hole peg test on L - 34.97 secs (last reeval) to 49.84 secs today due to increased pain. Pt. Stated ax's like buttoning and manipulating zippers has improved. 6) Patient will be knowledgeable of edema control techniques as evident with decreases from 19 cm circumferential of L wrist to at least 18-17 cm. Goal Met. Measurements decreased to 17.5 cm today. 7) Patient will demonstrate a non-tender/non-adherent scar. Slow progress towards goal.  Pt. Continues to have increase sensitivity at scar areas, but scar in non adherent. Pain Level: 7-8  on scale of 1-10, aching, throbbing and uncomfortable  In the elbow/ wrist/ thumb    Subjective:  Pt. Stated \"A couple kids grabbed my arm and it really hurt. \"    Objective:  Updated POC to be completed by 8-26-22. INTERVENTION: COMPLETED: SPECIFICS/COMMENTS:   Modality:      elbow  - 5 min   Fluidotherapy  -10 mins for desensitization and pain. US X -7 mins - int- 0.8 at 3.3 mhz at 50% over CT area and CMC of thumb . Paraffin bath  x -10 mins    AROM:     Hand/ thumb AROM/ AAROM X               - thumb AROM all planes  - opposition to index and middle finger with pom poms  -alt opposition with small beads. -bunchems deconstruction and alt prehension  -gather pom poms in L hand then place one at a time. -exercise spheres- 20 reps CCW  -FM  small nuts/bolts threading   -rubberband ax- digit extension and abduction   -adduction alternating fingers with pom poms  -coin manipulation- 3 coins at a time. Elbow/ Wrist AROM     X       - table top ball ex for combined flexion/ ext and pronation/supination- 15 reps each  -Wrist AROM all planes  -Ball on wall ex to increase AROM of LUE. - 7 reps   -wristocizer 4 reps, full grasp with use of elbow  -UBE- 3 mins forward and 3 mins backwards with BUE's.     Full arm ROM  - over shld grasp and release cones to simulate getting items in/ out cabinets   AAROM:               PROM/Stretching:     Wrist PROM x - gentle PROM/ less painful   Thumb PROM x - gentle PROM IP/ MP/ flexion/ ABD/ radial ext   Scar Mass/Edema Control:     Scar mgmt x - scar massage to all incisional sites/ most sensitive in the radial thumb        Strengthening:     L hand strengthening   -Beige (x-soft) theraputty ex's- - 20 reps and roll/pinch- 3 reps with palmar 3 point pinch only. LUE strengthening  X  X  x -PRE's 1# wt - wrist flexion/ext and UD/RD- 10 reps each.  -Lift and place two 1# wt's and 2.2# medicine ball- 5 reps   -1# wt'd dowel- pronation/supination and twists- 10 reps each   Other:     L elbow  x -Issued elbow protector to wear at night to protect Ulnar nerve.      -issued scar gel for scar management at base of thumb      Assessment/Comments: Pt is making Fair progress toward stated plan of care. Pt tolerated all stretches and measurements today, but had continues to have increased pain with all activity. Pt. To see doctor to determine further treatment. Collaborated with OT on current plan of care.          UE Assessment:  Left Upper Extremity ROM                                                                         IE       7/26/22 9/1/22  FOREARM Pronation 80-90* full full  full       Supination 80-90* 55 full  full        WRIST Flexion 0-80* 40  70*  70*       Extension 0-70* 15  30* 41*        Radial Deviation 0-20* 4  10* 15*        Ulnar Deviation 0-30* 3  17*  12*        Left Hand ROM    AROM []         PROM[]           THUMB MP Extension/Flexion  14-23* H /0-50* 22 22*  34*        IP Extension/Flexion  23-30* H/ 0-80* 14  35* 38*        Radial Abduction 53-71* 38  50* 60*        Palmar Abduction 50-71* 45  45* 53*        Opposition IF  RF SF            QuickDASH Score: 88.6% disability (IE), 77% disability (7/26/22) to 70% (9/1/22)    -Rehab Potential: Good  -Requires OT Follow Up: Yes    Time In: 0805 Time Out: 0900 Visit #18    Treatment Charges: Mins Time in - Time out  Units   Modalities fluido/US 10/7 2167-7451 9317-5780 1/0     Ther Exercise      Manual Therapy 35 8295-7352 1   Thera Activities 68 4724-2746 2   ADL/Home Mgt       Neuro Re-education      Gait Training      Group Therapy      Non-Billable Service Time:       Other      Total Time/Units 52  4     -Response to Treatment: Pt reports soreness in the FCR insertion and in the radial wrist by Tx end. Pt was reminded to use ice at home as needed. Goals: Goals for pt can be see on initial eval occurring on 6-27-22    Plan:   [x]  Continue Plan of care: Focus on AROM, prehension, light strengthening and pain management next session. Pt education continues at each visit to obtain maximum benefits from skilled OT intervention.   []  [facility] of care:   []  Discharge:      LAWRENCE Cabrera/JOELLE [id number]

## 2022-09-08 ENCOUNTER — OFFICE VISIT (OUTPATIENT)
Dept: ORTHOPEDIC SURGERY | Age: 72
End: 2022-09-08
Payer: COMMERCIAL

## 2022-09-08 VITALS — WEIGHT: 165 LBS | HEIGHT: 69 IN | BODY MASS INDEX: 24.44 KG/M2

## 2022-09-08 DIAGNOSIS — G56.22 ULNAR NEUROPATHY AT ELBOW, LEFT: Primary | ICD-10-CM

## 2022-09-08 PROCEDURE — 1123F ACP DISCUSS/DSCN MKR DOCD: CPT | Performed by: ORTHOPAEDIC SURGERY

## 2022-09-08 PROCEDURE — 99212 OFFICE O/P EST SF 10 MIN: CPT | Performed by: ORTHOPAEDIC SURGERY

## 2022-09-08 RX ORDER — GABAPENTIN 300 MG/1
300 CAPSULE ORAL 2 TIMES DAILY
Qty: 60 CAPSULE | Refills: 0 | Status: SHIPPED
Start: 2022-09-08 | End: 2022-09-13

## 2022-09-08 NOTE — PROGRESS NOTES
Chief Complaint   Patient presents with    Follow Up After Procedure     13 weeks out. left carpal tunnel release, ulnar nerve decompression at the elbow with anterior subcutaneous transposition, left revision de Quervain's tenolysis with radial sensory nerve neurolysis with neuroma excision and nerve. Jeanna Solano is a 66y.o. year old  who presents for follow up of left upper extremity: He is 3 months out from his ulnar nerve decompression at elbow with anterior subcutaneous transposition, carpal tunnel release, and radial sensory nerve neurectomy at the wrist.  He reports continued hypersensitivity over surgical sites. He reports pain in the radial sensory nerve distribution. He reports therapy is not very helpful. 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 He is wearing the Comfort Cool brace. He reports he is doing exercises at home.       Past Medical History:   Diagnosis Date    Cancer ([address] 07/2018    prostate     History of blood transfusion     1985 after \"ulcer surgery\"    Hyperlipidemia     Hypertension     Left wrist pain     Pain     low back, sacral     Past Surgical History:   Procedure Laterality Date    ARM SURGERY Left 6/8/2022    LEFT ULNAR NERVE DECOMPRESSION AT ELBOW WITH TRANSPOSITION, LEFT RADIAL SENSORY NEUROLYSIS AND NEURECTOMY WITH NERVE CAP, REVISION DE QUERVAIN'S RELEASE performed by Carlos Alberto Bah MD at [address] [facility] Left 6/8/2022    LEFT CARPAL TUNNEL RELEASE performed by Carlos Alberto Bah MD at [address] [facility] Right 07/31/2018    CATARACT REMOVAL WITH IMPLANT Left 07/02/2019    CYST REMOVAL      lt. axilla    HAND SURGERY      HEMORRHOID SURGERY      HERNIA REPAIR      INTRACAPSULAR CATARACT EXTRACTION Left 7/2/2019    LEFT EYE CATARACT EMULSIFICATION IOL IMPLANT - COMPLEX performed by Yasmany Harrison MD at [facility] [address] BLOCK Bilateral 08/17/2017    bilateral sacroiliac injection #1    NERVE BLOCK Bilateral 10/25/2017    Bilateral lumbar transforaminal epidural steroid injection    NERVE BLOCK Bilateral 11/02/2017    lumbar transforaminal epidural steriod injection #2    OTHER SURGICAL HISTORY  07-13-12    excision of cyst right 5th toe    AK XCAPSL CTRC RMVL INSJ IO LENS PROSTH W/O ECP Right 7/31/2018    CATARACT EXTRACTION WITH IOL RIGHT EYE performed by Gina Lowe MD at [address] [place] [address]      ulcer repair    WRIST ARTHROPLASTY Left 5/12/2021    WITH REVISION [facility] ARTHROPLASTY AND MINI TIGHTROPE APPLICATION APPLICATION EXTENSOR TENOSYNOVECTOMY AND FCR TENOTOMY  ; PARTIAL TRAPEZOIDECTOMY performed by Tequila White MD at [address] ARTHROSCOPY Left 5/12/2021    LEFT WRIST ARTHROSCOPY performed by Tequila White MD at [facility] OR       Current Outpatient Medications:     Silicone-Vitamin E (SCARCARE GEL-PAD KIT/LARGE) KIT, Apply 1 patch topically at bedtime, Disp: 1 kit, Rfl: 1    metoprolol succinate (TOPROL XL) 25 MG extended release tablet, Take 1 tablet by mouth daily, Disp: , Rfl:     pantoprazole (PROTONIX) 40 MG tablet, daily as needed , Disp: , Rfl:     simvastatin (ZOCOR) 20 MG tablet, Take by mouth daily , Disp: , Rfl:     diclofenac sodium (VOLTAREN) 1 % GEL, Apply 2 g topically 4 times daily, Disp: 2 Tube, Rfl: 1    amLODIPine (NORVASC) 10 MG tablet, Take 10 mg by mouth daily, Disp: , Rfl:   Allergies   Allergen Reactions    Codeine Nausea And Vomiting and Other (See Comments)     Dizziness, light headed    Motrin [Ibuprofen Micronized] Nausea And Vomiting     GI upset     Social History     Socioeconomic History    Marital status:      Spouse name: Not on file    Number of children: Not on file    Years of education: Not on file    Highest education level: Not on file   Occupational History    Not on file   Tobacco Use    Smoking status: Never    Smokeless tobacco: Never   Vaping Use    Vaping Use: Never used   Substance and Sexual Activity    Alcohol use: No    Drug use: No    Sexual activity: Not on file   Other Topics Concern    Not on file   Social History Narrative    Not on file     Social Determinants of Health     Financial Resource Strain: Not on file   Food Insecurity: Not on file   Transportation Needs: Not on file   Physical Activity: Not on file   Stress: Not on file   Social Connections: Not on file   Intimate Partner Violence: Not on file   Housing Stability: Not on file     Family History   Problem Relation Age of Onset    Cancer Mother     Cancer Father        Skin: (-) rash,(-) psoriasis,(-) eczema, (-)skin cancer. Musculoskeletal: (Continued pain to left upper extremity  Neurologic: (-) epilepsy, (-)seizures,(-) brain tumor,(-) TIA, (-)stroke, (-)headaches, (-)Parkinson disease,(-) memory loss, (-) LOC. Cardiovascular: (-) Chest pain, (-) swelling in legs/feet, (-) SOB, (-) cramping in legs/feet with walking. SUBJECTIVE:      Constitutional:    The patient is alert and oriented x 3, appears to be stated age and in no distress. Left upper extremity: Sensitive over the medial elbow scar. He does have a palpable ulnar nerve beneath the skin. Distally he has thickening of the carpal tunnel and radial surgical sites. Hypersensitivity to palpation. Diminished sensation in the radial sensory nerve with allodynia present. He is able to fully flex and extend the digits. Limited thumb range of motion secondary to pain and discomfort. Median and ulnar nerves intact distally. Negative atrophy. Intact intrinsic strength. 2+ ulnar pulse with wrist cap refill to the digits. Impression:   Encounter Diagnosis   Name Primary? Ulnar neuropathy at elbow, left Yes   Left carpal tunnel syndrome  Allodynia of left upper extremity    Plan: Today's findings were explained the patient. We did order him a pain cream last visit but he did not obtain this.   This was resubmitted through [facility]. We started him on gabapentin 300 mg nightly and transition to twice daily if tolerated. In addition he will continue with a home exercise program focusing on scar desensitization. Lastly we did make a referral to a pain management specialist.  I am concerned he may be developing a component of complex regional pain syndrome. We will have him follow-up in 6 weeks for reevaluation.

## 2022-09-12 DIAGNOSIS — M18.12 ARTHRITIS OF CARPOMETACARPAL (CMC) JOINT OF LEFT THUMB: ICD-10-CM

## 2022-09-12 DIAGNOSIS — G56.22 ULNAR NEUROPATHY AT ELBOW, LEFT: ICD-10-CM

## 2022-09-13 DIAGNOSIS — M18.12 ARTHRITIS OF CARPOMETACARPAL (CMC) JOINT OF LEFT THUMB: Primary | ICD-10-CM

## 2022-09-13 DIAGNOSIS — G56.32 RADIAL NEURITIS, LEFT: ICD-10-CM

## 2022-09-13 RX ORDER — AMITRIPTYLINE HYDROCHLORIDE 10 MG/1
TABLET, FILM COATED ORAL
Qty: 30 TABLET | Refills: 0 | Status: SHIPPED
Start: 2022-09-13 | End: 2022-10-04

## 2022-09-13 RX ORDER — GABAPENTIN 300 MG/1
300 CAPSULE ORAL 2 TIMES DAILY
Qty: 60 CAPSULE | Refills: 0 | Status: SHIPPED
Start: 2022-09-13 | End: 2022-10-04 | Stop reason: SDUPTHER

## 2022-10-04 ENCOUNTER — OFFICE VISIT (OUTPATIENT)
Dept: PAIN MANAGEMENT | Age: 72
End: 2022-10-04
Payer: COMMERCIAL

## 2022-10-04 VITALS
OXYGEN SATURATION: 99 % | HEIGHT: 69 IN | HEART RATE: 61 BPM | BODY MASS INDEX: 24.44 KG/M2 | TEMPERATURE: 97.3 F | DIASTOLIC BLOOD PRESSURE: 80 MMHG | RESPIRATION RATE: 16 BRPM | SYSTOLIC BLOOD PRESSURE: 122 MMHG | WEIGHT: 165 LBS

## 2022-10-04 DIAGNOSIS — G56.22 ULNAR NEUROPATHY AT ELBOW, LEFT: ICD-10-CM

## 2022-10-04 DIAGNOSIS — M77.8 ENTHESOPATHY OF ELBOW: ICD-10-CM

## 2022-10-04 DIAGNOSIS — M79.2 NEURALGIA AND NEURITIS: Primary | ICD-10-CM

## 2022-10-04 DIAGNOSIS — M65.4 RADIAL STYLOID TENOSYNOVITIS: ICD-10-CM

## 2022-10-04 PROCEDURE — 1123F ACP DISCUSS/DSCN MKR DOCD: CPT | Performed by: ANESTHESIOLOGY

## 2022-10-04 PROCEDURE — 99204 OFFICE O/P NEW MOD 45 MIN: CPT | Performed by: ANESTHESIOLOGY

## 2022-10-04 RX ORDER — DULOXETIN HYDROCHLORIDE 30 MG/1
30 CAPSULE, DELAYED RELEASE ORAL DAILY
Qty: 30 CAPSULE | Refills: 2 | Status: SHIPPED | OUTPATIENT
Start: 2022-10-04

## 2022-10-04 RX ORDER — CYCLOBENZAPRINE HCL 10 MG
TABLET ORAL
COMMUNITY
Start: 2022-09-12 | End: 2022-10-04

## 2022-10-04 RX ORDER — GABAPENTIN 300 MG/1
300 CAPSULE ORAL 3 TIMES DAILY
Qty: 90 CAPSULE | Refills: 2 | Status: SHIPPED | OUTPATIENT
Start: 2022-10-04 | End: 2023-01-02

## 2022-10-04 RX ORDER — LIDOCAINE 36 MG/1
PATCH TOPICAL
Qty: 9 PATCH | Refills: 0 | COMMUNITY
Start: 2022-10-04

## 2022-10-04 RX ORDER — TRAMADOL HYDROCHLORIDE 50 MG/1
TABLET ORAL
COMMUNITY
Start: 2022-06-27 | End: 2022-10-04

## 2022-10-04 RX ORDER — LIDOCAINE 36 MG/1
PATCH TOPICAL
Qty: 30 PATCH | Refills: 3 | Status: SHIPPED | OUTPATIENT
Start: 2022-10-04

## 2022-10-04 NOTE — PROGRESS NOTES
Patient:  Abdirizak Roach.,  1950  Date of Service:  10/4/22      Do you currently have any of the following:    Fever: No  Headache:  No  Cough: No  Shortness of breath: No  Exposed to anyone with these symptoms: No       Patient presents to [facility] with complaints of left arm pain that started 1 years ago and has been getting worse. He states the pain began following No specific cause    Pain is constant and is described as sharp. He rates the pain as a 9/10 on his worst day , 7/10 on his best day, and a 8/10 on average on the VAS scale. Pain does radiate to left arm. He  has numbness of the fingers in his left hand. .    Alleviating factors include: nothing. Aggravating factors include:  lying down. He states that the pain does keep him from sleeping at night. He took his last dose of  nothing at this time. Constance Walker He is not on NSAIDS and  is not on anticoagulation medications to include none and is managed by NA. Previous treatments: Physical Therapy, Nerve block, and Surgery by Dr. Nathalie Reyna x 2. .      Personal Expectations from this treatment: increase activity and decrease pain    /80   Pulse 61   Temp 97.3 °F (36.3 °C) (Infrared)   Resp 16   Ht 5' 9\" (1.753 m)   Wt 165 lb (74.8 kg)   SpO2 99%   BMI 24.37 kg/m²     No LMP for male patient.

## 2022-10-04 NOTE — PROGRESS NOTES
[address] [place] [address], [address] [place] [address]      [phone]                  Consult Note      Patient:  Alena Lopez,  1950    Date of Service:  10/04/22     Requesting Physician:  Droeen Khan MD    Reason for Consult:      Patient presents with complaints of left UE pain    HISTORY OF PRESENT ILLNESS:      Mr. Alena Lopez is a 66 y.o. male [facility]  left UE pain. Work related injury in - injury to the left hand- s/p surgery initially-Did well. Repeat injury and subsequent treatment with Dr. Ramin Angulo. [facility] case  POR : Dr. Garrison Lucia    S/P revision thumb basal joint arthroplasty with use of Arthrex mini tight rope system as well as concurrent wrist arthroscopy and debridement of peripheral/central tear of TFCC - in May 2021 by Dr. Ramin Angulo. S/P ulnar nerve decompression at elbow with anterior subcutaneous transposition, carpal tunnel release, and radial sensory nerve neurectomy at the wrist on 2022. Continues to have pain and sensitivity over the radial foraram/ hand, wrist and over the scar at the medial elbow. Pain causing limitaitons. Has done PT/ OT. Pain is constant and is described as sharp and throbbing. Alleviating factors include: rest.  Aggravating factors include: movement, bending, lifting, touching. Pain causes functional limitations/ limits Adl's : Yes    Priro treatement at [facility]- was on chronic opioids for low back pain issues.     Previous treatments:   Physical Therapy : yes,      Medications: - NSAID's : yes             - Membrane stabilizers : yes             - Opioids : yes,             - Adjuvants or Others : yes,      Surgeries: yes    Anticoagulation medications: no     H/O Smoking: no  H/O alcohol abuse : no  H/O Illicit drug use : no    Employment: employed- works in [employer]    EMG: 10/21/2021:  EMG was done today and showed a very mild radial sensory neuropathy, mild left carpal tunnel syndrome, mild left cubital tunnel syndrome. The patient was educated about the diagnosis and the prognosis. Imaging:   Pertinent image findings reviewed.     Past Medical History:   Diagnosis Date    Cancer ([address] 07/2018    prostate     History of blood transfusion     1985 after \"ulcer surgery\"    Hyperlipidemia     Hypertension     Left wrist pain     Pain     low back, sacral       Past Surgical History:   Procedure Laterality Date    ARM SURGERY Left 6/8/2022    LEFT ULNAR NERVE DECOMPRESSION AT ELBOW WITH TRANSPOSITION, LEFT RADIAL SENSORY NEUROLYSIS AND NEURECTOMY WITH NERVE CAP, REVISION DE QUERVAIN'S RELEASE performed by Patt Venegas MD at [address] Left 6/8/2022    LEFT CARPAL TUNNEL RELEASE performed by Patt Venegas MD at [address] Right 07/31/2018    CATARACT REMOVAL WITH IMPLANT Left 07/02/2019    CYST REMOVAL      lt. axilla    HAND SURGERY      HEMORRHOID SURGERY      HERNIA REPAIR      INTRACAPSULAR CATARACT EXTRACTION Left 7/2/2019    LEFT EYE CATARACT EMULSIFICATION IOL IMPLANT - COMPLEX performed by Remington Tam MD at [address] [facility] Bilateral 08/17/2017    bilateral sacroiliac injection #1    NERVE BLOCK Bilateral 10/25/2017    Bilateral lumbar transforaminal epidural steroid injection    NERVE BLOCK Bilateral 11/02/2017    lumbar transforaminal epidural steriod injection #2    OTHER SURGICAL HISTORY  07-13-12    excision of cyst right 5th toe    MA XCAPSL CTRC RMVL INSJ IO LENS PROSTH W/O ECP Right 7/31/2018    CATARACT EXTRACTION WITH IOL RIGHT EYE performed by Remington Tam MD at [address] [place] [address]      ulcer repair    WRIST ARTHROPLASTY Left 5/12/2021    WITH REVISION [facility] ARTHROPLASTY AND MINI TIGHTROPE APPLICATION APPLICATION EXTENSOR TENOSYNOVECTOMY AND FCR TENOTOMY  ; PARTIAL TRAPEZOIDECTOMY performed by Jeniffer Montanez MD at [address] ARTHROSCOPY Left 5/12/2021    LEFT WRIST ARTHROSCOPY performed by Jeniffer Montanez MD at [address] [facility] [address]       Prior to Admission medications    Medication Sig Start Date End Date Taking? Authorizing Provider   amitriptyline (ELAVIL) 10 MG tablet TAKE 1 TABLET BY MOUTH EVERY DAY AT NIGHT 9/13/22  Yes Jeniffer Montanez MD   gabapentin (NEURONTIN) 300 MG capsule TAKE 1 CAPSULE BY MOUTH 2 TIMES DAILY FOR 30 DAYS.  INTENDED SUPPLY: 30 DAYS 9/13/22 10/13/22 Yes Jeniffer Montanez MD   metoprolol succinate (TOPROL XL) 25 MG extended release tablet Take 1 tablet by mouth daily   Yes Historical Provider, MD   pantoprazole (PROTONIX) 40 MG tablet daily as needed  5/17/20  Yes Historical Provider, MD   simvastatin (ZOCOR) 20 MG tablet Take by mouth daily  3/30/20  Yes Historical Provider, MD   amLODIPine (NORVASC) 10 MG tablet Take 10 mg by mouth daily   Yes Historical Provider, MD   diclofenac sodium (VOLTAREN) 1 % GEL Apply 2 g topically 4 times daily  Patient not taking: Reported on 10/4/2022 6/1/20   Jeniffer Montanez MD       Allergies   Allergen Reactions    Codeine Nausea And Vomiting and Other (See Comments)     Dizziness, light headed    Motrin [Ibuprofen Micronized] Nausea And Vomiting     GI upset       Social History     Socioeconomic History    Marital status:      Spouse name: Not on file    Number of children: Not on file    Years of education: Not on file    Highest education level: Not on file   Occupational History    Not on file   Tobacco Use    Smoking status: Never    Smokeless tobacco: Never   Vaping Use    Vaping Use: Never used   Substance and Sexual Activity    Alcohol use: No    Drug use: No    Sexual activity: Not on file   Other Topics Concern    Not on file   Social History Narrative    Not on file     Social Determinants of Health     Financial Resource Strain: Not on file   Food Insecurity: Not on file   Transportation Needs: Not on file   Physical Activity: Not on file   Stress: Not on file   Social Connections: Not on file   Intimate Partner Violence: Not on file   Housing Stability: Not on file       Family History   Problem Relation Age of Onset    Cancer Mother     Cancer Father        REVIEW OF SYSTEMS:     Patient specifically denies fever/chills, chest pain, shortness of breath, new bowel or bladder complaints. All other review of systems was negative. Review of Systems - Documented reviewed    PHYSICAL EXAMINATION:      /80   Pulse 61   Temp 97.3 °F (36.3 °C) (Infrared)   Resp 16   Ht 5' 9\" (1.753 m)   Wt 165 lb (74.8 kg)   SpO2 99%   BMI 24.37 kg/m²     General:      General appearance:  Pleasant and well-hydrated, in no distress and A & O x 3  Build:Normal Weight  Function: Rises from seated position easily    HEENT:    Head:normocephalic, atraumatic  Pupils:regular, round, equal  Sclera: icterus absent    Lungs:    Breathing:normal breathing pattern     CVS:     RRR    Abdomen:    Shape:non-distended and normal    Cervical spine:    Inspection:normal  Palpation:tenderness paravertebral muscles, tenderness trapezium, left, right : no  Range of motion:no pain    Thoracic spine:     Spine inspection:normal   Range of motion:normal     Lumbar spine:    Spine inspection: Normal   Palpation: Tenderness paravertebral muscles No   Range of motion: reduced    Musculoskeletal:    Trigger points No     Extremities:    Tremors:None bilaterally upper and lower  Edema:no    Left Upper extremity:  Scar seen over the left lateral wrist, anterior wrist, medial elbow- healed well  Hypersensitivity over the scars noted  ROM - is reduced  Weakness of the left hand  +  Loss of thenar eminence noted    Neurological: RAMOS    Dermatology:    Skin:no rashes or lesions noted    Assessment/Plan:     Diagnosis Orders   1. Neuralgia and neuritis        2. Radial styloid tenosynovitis        3.  Enthesopathy of elbow            66 y.o. male with h/o left UE pain. S/p surgery as detailed. Health system case. Feature of neuraliga- early CRPS-2 ([place] criteria as detailed below). Taking Gabapentin once a day prn. Recommend to take Gabapentin 300 mg tid. Elavil not helping- DC    Start Cymbalta 30 mg Q hs. Consider increasing the dose. ZT lido patch to use over the scar at the wrist and elbow. PT/OT. If continued pain, will consider left stellate ganglion block. F/U in 6-8 weeks. Counseling : Patient encouraged to stay active and to  continue Regular home exercise program as tolerated - stretching / strengthening. Treatment plan discussed with the patient including medication and procedure side effects. Controlled Substances Monitoring: [place] reviewed.     Tova Mott MD    CC:    MD Sandra Gloria   MD Liz Ibanez [facility] [id number]

## 2022-10-10 ENCOUNTER — TELEPHONE (OUTPATIENT)
Dept: PAIN MANAGEMENT | Age: 72
End: 2022-10-10

## 2022-10-10 RX ORDER — LIDOCAINE 50 MG/G
1 PATCH TOPICAL DAILY
Qty: 30 PATCH | Refills: 0 | Status: SHIPPED | OUTPATIENT
Start: 2022-10-10 | End: 2022-11-09

## 2022-10-17 ENCOUNTER — TELEPHONE (OUTPATIENT)
Dept: PAIN MANAGEMENT | Age: 72
End: 2022-10-17

## 2022-10-17 DIAGNOSIS — M65.4 RADIAL STYLOID TENOSYNOVITIS: ICD-10-CM

## 2022-10-17 DIAGNOSIS — M79.2 NEURALGIA AND NEURITIS: Primary | ICD-10-CM

## 2022-10-17 NOTE — TELEPHONE ENCOUNTER
Ivelisse Silva. called stating Dr. Buck Marrero had wanted him to go to therapy. He states he had told the doctor that he wanted to go somewhere else and that he was told he could go wherever he wanted to go. I advised him I would check with Dr. Buck Marrero.

## 2022-10-20 ENCOUNTER — OFFICE VISIT (OUTPATIENT)
Dept: ORTHOPEDIC SURGERY | Age: 72
End: 2022-10-20
Payer: COMMERCIAL

## 2022-10-20 VITALS — BODY MASS INDEX: 24.44 KG/M2 | HEIGHT: 69 IN | WEIGHT: 165 LBS

## 2022-10-20 DIAGNOSIS — D36.12: ICD-10-CM

## 2022-10-20 DIAGNOSIS — G56.32 RADIAL NEURITIS, LEFT: Primary | ICD-10-CM

## 2022-10-20 PROCEDURE — 1123F ACP DISCUSS/DSCN MKR DOCD: CPT | Performed by: ORTHOPAEDIC SURGERY

## 2022-10-20 PROCEDURE — 99213 OFFICE O/P EST LOW 20 MIN: CPT | Performed by: ORTHOPAEDIC SURGERY

## 2022-10-20 NOTE — PROGRESS NOTES
[facility] OR    NERVE BLOCK Bilateral 08/17/2017    bilateral sacroiliac injection #1    NERVE BLOCK Bilateral 10/25/2017    Bilateral lumbar transforaminal epidural steroid injection    NERVE BLOCK Bilateral 11/02/2017    lumbar transforaminal epidural steriod injection #2    OTHER SURGICAL HISTORY  07-13-12    excision of cyst right 5th toe    OK XCAPSL CTRC RMVL INSJ IO LENS PROSTH W/O ECP Right 7/31/2018    CATARACT EXTRACTION WITH IOL RIGHT EYE performed by Otoniel Brandon MD at [facility]      ulcer repair    WRIST ARTHROPLASTY Left 5/12/2021    WITH REVISION [address] ARTHROPLASTY AND MINI TIGHTROPE APPLICATION APPLICATION EXTENSOR TENOSYNOVECTOMY AND FCR TENOTOMY  ; PARTIAL TRAPEZOIDECTOMY performed by Angel San MD at [address] ARTHROSCOPY Left 5/12/2021    LEFT WRIST ARTHROSCOPY performed by Angel San MD at [facility] OR       Current Outpatient Medications:     lidocaine (LIDODERM) 5 %, Place 1 patch onto the skin daily 12 hours on, 12 hours off., Disp: 30 patch, Rfl: 0    gabapentin (NEURONTIN) 300 MG capsule, Take 1 capsule by mouth 3 times daily for 90 days.  Intended supply: 30 days, Disp: 90 capsule, Rfl: 2    DULoxetine (CYMBALTA) 30 MG extended release capsule, Take 1 capsule by mouth daily, Disp: 30 capsule, Rfl: 2    Lidocaine (ZTLIDO) 1.8 % PTCH, Place patch to affected area 12 hours on and 12 hours off., Disp: 30 patch, Rfl: 3    Lidocaine (ZTLIDO) 1.8 % PTCH, Apply to affected area 12 hours on 12 hours off., Disp: 9 patch, Rfl: 0    metoprolol succinate (TOPROL XL) 25 MG extended release tablet, Take 1 tablet by mouth daily, Disp: , Rfl:     pantoprazole (PROTONIX) 40 MG tablet, daily as needed , Disp: , Rfl:     simvastatin (ZOCOR) 20 MG tablet, Take by mouth daily , Disp: , Rfl:     amLODIPine (NORVASC) 10 MG tablet, Take 10 mg by mouth daily, Disp: , Rfl:   Allergies   Allergen Reactions    Codeine Nausea And Vomiting and Other (See Comments) Dizziness, light headed    Motrin [Ibuprofen Micronized] Nausea And Vomiting     GI upset     Social History     Socioeconomic History    Marital status:      Spouse name: Not on file    Number of children: Not on file    Years of education: Not on file    Highest education level: Not on file   Occupational History    Not on file   Tobacco Use    Smoking status: Never    Smokeless tobacco: Never   Vaping Use    Vaping Use: Never used   Substance and Sexual Activity    Alcohol use: No    Drug use: No    Sexual activity: Not on file   Other Topics Concern    Not on file   Social History Narrative    Not on file     Social Determinants of Health     Financial Resource Strain: Not on file   Food Insecurity: Not on file   Transportation Needs: Not on file   Physical Activity: Not on file   Stress: Not on file   Social Connections: Not on file   Intimate Partner Violence: Not on file   Housing Stability: Not on file     Family History   Problem Relation Age of Onset    Cancer Mother     Cancer Father        Skin: (-) rash,(-) psoriasis,(-) eczema, (-)skin cancer. Musculoskeletal: continued pain left wrist  Neurologic: (-) epilepsy, (-)seizures,(-) brain tumor,(-) TIA, (-)stroke, (-)headaches, (-)Parkinson disease,(-) memory loss, (-) LOC. Cardiovascular: (-) Chest pain, (-) swelling in legs/feet, (-) SOB, (-) cramping in legs/feet with walking. SUBJECTIVE:      Constitutional:    The patient is alert and oriented x 3, appears to be stated age and in no distress. Left upper extremity: Nontender about the shoulder and elbow. Well-healed scars about the wrist.  He does have hypersensitivity to palpation which radiates into the terminal branches of the radial sensory nerve particular over the volar aspect of the scar. Limited range of motion secondary to hypersensitivity. Median and ulnar nerves are intact distally. Motor testing 5/5 grossly. 2+ radial pulse.       Impression: Just over 4 months out from revision surgery with continued scar sensitivity and in particular significant neurogenic pain status post radial sensory nerve neurectomy and neurolysis  1. Left carpal tunnel release. 2.  Left anterior subcutaneous ulnar nerve transposition with decompression at elbow. 3.  Left revision de Quervain's tenolysis. 4.  Left radial sensory nerve neurolysis. 5.  Left radial sensory nerve neuroma excision and application of AxoGen  nerve cap followed by placement of the nerve stump within the distal  radius and sewn into place with 8-0 nylon suture. Plan: Today's findings were explained. Patient relates that his pain management doctor would like him to continue with some therapy specifically focusing on nerve desensitization. In addition his Worker's Compensation has denied specially compounded cream for nerve pain as well as Lidoderm patches. We will ask for an additional diagnosis of radial sensory nerve neuritis given the findings at the time of surgery of a neuroma involving this nerve. With any degree of medical certainty I do feel that his nerve symptoms are related to his workers compensation claim. All questions were answered.   Follow-up after completing therapy

## 2022-10-24 NOTE — TELEPHONE ENCOUNTER
I had left Roberta Boyle. A message to call me back regarding therapy. He wanted to go to [facility] Physical therapy. I advised him I would fax the order and send the C-9 authorization to them. I also advised him of their phone number and for him to call them if he does not hear from them in the next few days. I advised kalpesh to call me back if any issues arise from scheduling.

## 2022-10-27 RX ORDER — DULOXETIN HYDROCHLORIDE 30 MG/1
CAPSULE, DELAYED RELEASE ORAL
Qty: 30 CAPSULE | Refills: 2 | OUTPATIENT
Start: 2022-10-27

## 2022-11-15 ENCOUNTER — OFFICE VISIT (OUTPATIENT)
Dept: PAIN MANAGEMENT | Age: 72
End: 2022-11-15
Payer: COMMERCIAL

## 2022-11-15 VITALS
SYSTOLIC BLOOD PRESSURE: 116 MMHG | TEMPERATURE: 97.7 F | BODY MASS INDEX: 24.44 KG/M2 | WEIGHT: 165 LBS | DIASTOLIC BLOOD PRESSURE: 72 MMHG | HEIGHT: 69 IN | HEART RATE: 62 BPM | OXYGEN SATURATION: 98 %

## 2022-11-15 DIAGNOSIS — M79.2 NEURALGIA AND NEURITIS: Primary | ICD-10-CM

## 2022-11-15 DIAGNOSIS — S60.222S CONTUSION OF LEFT HAND, SEQUELA: ICD-10-CM

## 2022-11-15 DIAGNOSIS — G56.22 ULNAR NEUROPATHY AT ELBOW, LEFT: ICD-10-CM

## 2022-11-15 DIAGNOSIS — M18.12 ARTHRITIS OF CARPOMETACARPAL (CMC) JOINT OF LEFT THUMB: ICD-10-CM

## 2022-11-15 PROCEDURE — 99213 OFFICE O/P EST LOW 20 MIN: CPT | Performed by: ANESTHESIOLOGY

## 2022-11-15 PROCEDURE — 99214 OFFICE O/P EST MOD 30 MIN: CPT | Performed by: ANESTHESIOLOGY

## 2022-11-15 PROCEDURE — 1123F ACP DISCUSS/DSCN MKR DOCD: CPT | Performed by: ANESTHESIOLOGY

## 2022-11-15 RX ORDER — DULOXETIN HYDROCHLORIDE 60 MG/1
60 CAPSULE, DELAYED RELEASE ORAL NIGHTLY
Qty: 30 CAPSULE | Refills: 2 | Status: SHIPPED | OUTPATIENT
Start: 2022-11-15

## 2022-11-15 RX ORDER — PREGABALIN 75 MG/1
75 CAPSULE ORAL 2 TIMES DAILY
Qty: 60 CAPSULE | Refills: 2 | Status: SHIPPED | OUTPATIENT
Start: 2022-11-15 | End: 2023-02-13

## 2022-11-15 NOTE — PROGRESS NOTES
Do you currently have any of the following:    Fever: No  Headache:  No  Cough: No  Shortness of breath: No  Exposed to anyone with these symptoms: Audrey Fairbanks. presents to the [address] [address] on 11/15/2022. Jacobo Allen is complaining of pain left arm. The pain is constant. The pain is described as sharp. Pain is rated on his best day at a 8, on his worst day at a 9, and on average at a 8 on the VAS scale. He took his last dose of Neurontin today. Jacobo Allen does not have issues with constipation. Any procedures since your last visit: No.    He is not on NSAIDS and  is not on anticoagulation medications to include none and is managed by none. Pacemaker or defibrillator: No.    Medication Contract and Consent for Opioid Use Documents Filed        No documents found                       There were no vitals taken for this visit. No LMP for male patient.

## 2022-11-15 NOTE — PROGRESS NOTES
[facility]  [address] [facility], [address] [place] [address]  [phone]    Follow up Note      Rajat Mendoza. Date of Visit:  11/15/2022    CC:  Patient presents for follow up   Chief Complaint   Patient presents with    Arm Pain       HPI:    Left UE pain. Work related injury in 2015- injury to the left hand- s/p surgery initially-Did well. Repeat injury and subsequent treatment with Dr. Andrea Taylor. [facility] case  POR : Dr. Galo Camara     S/P revision thumb basal joint arthroplasty with use of Arthrex mini tight rope system as well as concurrent wrist arthroscopy and debridement of peripheral/central tear of TFCC - in May 2021 by Dr. Andrea Taylor. S/P ulnar nerve decompression at elbow with anterior subcutaneous transposition, carpal tunnel release, and radial sensory nerve neurectomy at the wrist on 6/8/2022. Continues to have pain and sensitivity over the radial foraram/ hand, wrist and over the scar at the medial elbow. Has done PT/ OT. Pain is constant and is described      Pain causes functional limitations/ limits Adl's : Yes     Priro treatment at [facility] pain clinic- was on chronic opioids for low back pain issues. Previous treatments:   Physical Therapy : yes,       Medications: - NSAID's : yes                        - Membrane stabilizers : yes                        - Opioids : yes,                        - Adjuvants or Others : yes      Surgeries: yes     Anticoagulation medications: no      H/O Smoking: no  H/O alcohol abuse : no  H/O Illicit drug use : no     Employment: employed- works in [place] [id number]     EMG: 10/21/2021:  EMG was done today and showed a very mild radial sensory neuropathy, mild left carpal tunnel syndrome, mild left cubital tunnel syndrome. The patient was educated about the diagnosis and the prognosis. Imaging:   Pertinent image findings reviewed.      OARRS report[de-identified] reviewed    Past Medical History:   Diagnosis Date Cancer ([address] 07/2018    prostate     History of blood transfusion     1985 after \"ulcer surgery\"    Hyperlipidemia     Hypertension     Left wrist pain     Pain     low back, sacral       Past Surgical History:   Procedure Laterality Date    ARM SURGERY Left 6/8/2022    LEFT ULNAR NERVE DECOMPRESSION AT ELBOW WITH TRANSPOSITION, LEFT RADIAL SENSORY NEUROLYSIS AND NEURECTOMY WITH NERVE CAP, REVISION DE QUERVAIN'S RELEASE performed by Manuel Neal MD at [address] Left 6/8/2022    LEFT CARPAL TUNNEL RELEASE performed by Manuel Neal MD at [address] Right 07/31/2018    CATARACT REMOVAL WITH IMPLANT Left 07/02/2019    CYST REMOVAL      lt. axilla    HAND SURGERY      HEMORRHOID SURGERY      HERNIA REPAIR      INTRACAPSULAR CATARACT EXTRACTION Left 7/2/2019    LEFT EYE CATARACT EMULSIFICATION IOL IMPLANT - COMPLEX performed by Azeb Rodriguez MD at [address] Bilateral 08/17/2017    bilateral sacroiliac injection #1    NERVE BLOCK Bilateral 10/25/2017    Bilateral lumbar transforaminal epidural steroid injection    NERVE BLOCK Bilateral 11/02/2017    lumbar transforaminal epidural steriod injection #2    OTHER SURGICAL HISTORY  07-13-12    excision of cyst right 5th toe    [place] XCAPSL CTRC RMVL INSJ IO LENS PROSTH W/O ECP Right 7/31/2018    CATARACT EXTRACTION WITH IOL RIGHT EYE performed by Azeb Rodriguez MD at [address] [facility]      ulcer repair    WRIST ARTHROPLASTY Left 5/12/2021    WITH REVISION [facility] ARTHROPLASTY AND MINI TIGHTROPE APPLICATION APPLICATION EXTENSOR TENOSYNOVECTOMY AND FCR TENOTOMY  ; PARTIAL TRAPEZOIDECTOMY performed by Manuel Neal MD at [address] [facility] ARTHROSCOPY Left 5/12/2021    LEFT WRIST ARTHROSCOPY performed by Manuel Neal MD at [address] [facility] [address]       Prior to Admission medications    Medication Sig Start Date End Date Taking?  Authorizing Provider   gabapentin (NEURONTIN) 300 MG capsule Take 1 capsule by mouth 3 times daily for 90 days. Intended supply: 30 days 10/4/22 1/2/23 Yes Shay Christie MD   DULoxetine (CYMBALTA) 30 MG extended release capsule Take 1 capsule by mouth daily 10/4/22  Yes Shay Christie MD   Lidocaine (ZTLIDO) 1.8 % PTCH Place patch to affected area 12 hours on and 12 hours off. 10/4/22  Yes Shay Christie MD   Lidocaine (ZTLIDO) 1.8 % PTCH Apply to affected area 12 hours on 12 hours off.  10/4/22  Yes Shay Christie MD   metoprolol succinate (TOPROL XL) 25 MG extended release tablet Take 1 tablet by mouth daily   Yes Historical Provider, MD   pantoprazole (PROTONIX) 40 MG tablet daily as needed  5/17/20  Yes Historical Provider, MD   simvastatin (ZOCOR) 20 MG tablet Take by mouth daily  3/30/20  Yes Historical Provider, MD   amLODIPine (NORVASC) 10 MG tablet Take 10 mg by mouth daily   Yes Historical Provider, MD       Allergies   Allergen Reactions    Codeine Nausea And Vomiting and Other (See Comments)     Dizziness, light headed    Motrin [Ibuprofen Micronized] Nausea And Vomiting     GI upset       Social History     Socioeconomic History    Marital status:      Spouse name: Not on file    Number of children: Not on file    Years of education: Not on file    Highest education level: Not on file   Occupational History    Not on file   Tobacco Use    Smoking status: Never    Smokeless tobacco: Never   Vaping Use    Vaping Use: Never used   Substance and Sexual Activity    Alcohol use: No    Drug use: No    Sexual activity: Not on file   Other Topics Concern    Not on file   Social History Narrative    Not on file     Social Determinants of Health     Financial Resource Strain: Not on file   Food Insecurity: Not on file   Transportation Needs: Not on file   Physical Activity: Not on file   Stress: Not on file   Social Connections: Not on file   Intimate Partner Violence: Not on file   Housing Stability: Not on file       Family History   Problem Relation Age of Onset    Cancer Mother     Cancer Father        REVIEW OF SYSTEMS:     Roman Pelletier denies fever/chills, chest pain, shortness of breath, new bowel or bladder complaints. All other review of systems was negative. PHYSICAL EXAMINATION:      /72   Pulse 62   Temp 97.7 °F (36.5 °C) (Infrared)   Ht 5' 9\" (1.753 m)   Wt 165 lb (74.8 kg)   SpO2 98%   BMI 24.37 kg/m²        General:       General appearance:  Pleasant and well-hydrated, in no distress and A & O x 3  Build:Normal Weight  Function: Rises from seated position easily     HEENT:     Head:normocephalic, atraumatic    Lungs:     Breathing:normal breathing pattern      CVS:     RRR     Abdomen:     Shape:non-distended and normal     Cervical spine:     Inspection:normal  Palpation:tenderness paravertebral muscles, tenderness trapezium, left, right : no  Range of motion:no pain     Thoracic spine:                Spine inspection:normal   Range of motion:normal      Lumbar spine:     Spine inspection: Normal      Musculoskeletal:     Trigger points No      Extremities:     Tremors:None bilaterally upper and lower  Edema:no     Left Upper extremity:  Scar seen over the left lateral wrist, anterior wrist, medial elbow- healed well  Hypersensitivity over the scars noted  ROM - is reduced  Weakness of the left hand  +  Loss of thenar eminence noted     Neurological: RAMOS     Dermatology:     Skin:no rashes or lesions noted     Assessment/Plan:       Diagnosis Orders   1. Neuralgia and neuritis          2. Radial styloid tenosynovitis          3. Enthesopathy of elbow                66 y.o.  male with H/o left UE pain. S/p surgery as detailed. [facility] case. Feature of neuralgia- early CRPS-2      Taking Gabapentin 300 mg tid. Not helping. D/C    Will change it to Lyrica 75 mg bid. Cymbalta 30 mg Q hs. Will increase to 60 mg Q hs.    ZT lido patch- not much help    Compound cream- did not fill due to insurance issue.   Re- prescribe. Will prescribe Local compound cream.   Diclofenac, Gabapentin, Baclofen, Lidocaine, Doxepin. Apply 1-2 grams TID over the painful area. Dispense #240 gram with X 2 refills. Use instructions and side effects explained. PT/OT. If continued pain, will consider left stellate ganglion block. F/U in 2 months. Counseling : Patient encouraged to stay active and to  continue Regular home exercise program as tolerated - stretching / strengthening. Treatment plan discussed with the patient including medication and procedure side effects. Controlled Substances Monitoring: OARRS reviewed.      Ca Ramos MD

## 2022-12-09 RX ORDER — DULOXETIN HYDROCHLORIDE 60 MG/1
CAPSULE, DELAYED RELEASE ORAL
Qty: 30 CAPSULE | Refills: 2 | OUTPATIENT
Start: 2022-12-09

## 2022-12-27 ENCOUNTER — TELEPHONE (OUTPATIENT)
Dept: PAIN MANAGEMENT | Age: 72
End: 2022-12-27

## 2023-01-18 RX ORDER — DULOXETIN HYDROCHLORIDE 60 MG/1
CAPSULE, DELAYED RELEASE ORAL
Qty: 30 CAPSULE | Refills: 2 | OUTPATIENT
Start: 2023-01-18

## 2023-02-13 ENCOUNTER — TELEPHONE (OUTPATIENT)
Dept: ENT CLINIC | Age: 73
End: 2023-02-13

## 2023-02-21 ENCOUNTER — CLINICAL DOCUMENTATION (OUTPATIENT)
Dept: ORTHOPEDIC SURGERY | Age: 73
End: 2023-02-21

## 2023-02-21 NOTE — PROGRESS NOTES
To whom it may concern,    This is clinical documentation in regards to Dr. Tarik Schmid MD independent medical evaluation on Feb 14, 2023 in regards to Mr. Chris Parr ongoing persistent left radial sided wrist pain. I did review his evaluation which documented a clinical examination of range of motion of the left wrist and both shoulders as well as documentation of the scars about the left elbow and wrist in terms of length only. Gross motor strength was documented as limited due to pain as well as symmetrical reflexes. Sensory was documented as \"intact\" but specific nerve distributions were not documented. His exam documented \"slight decrease sensation on the volar aspect of the left wrist and in the entire left thumb; however he can recognize two-point discrimination test.  There is no decrease sensation in the left forearm or arm. \"  His documented examination described regions of anatomy of the forearm and a nonspecific anatomic distribution and did not specifically addressed the radial sensory nerve distribution. In addition there is documentation of the two-point examination did not address any specific digit or the sensory distribution of the radial sensory nerve over the dorsal radial aspect of the hand. It was Dr. Sandy Morrow opinion that the condition of radial sensory neuritis was not supported as there was no documented \"wrist drop, numbness on the back of the hand or wrist or inability to voluntarily straighten the fingers. \"   Radial sensory nerve neuritis, Wartenberg syndrome or cheiralgia paresthetica was first documented and described by Dr Pam Fregoso in 1932. Many papers and articles are available that describe this condition. An excellent review article on radial sensory nerve anatomy and injuries is provided below:      Unusual Compression Neuropathies of the Forearm, Part I: Radial Nerve  Journal of Hand SurgeryVol. 34Issue 19L3685-1347 Published in issue: December, 2009.           In this article this condition is described as: \"Patients with SRN compression typically report pain or dysesthesias on the dorsal radial forearm radiating to  the thumb and index finger, although the distribution of symptoms may vary owing to differences in anatomy. \" The article goes on to describe . Ghada Aguilar \"patients with SRN tend to have symptoms at rest, independent of the position of the wrist and thumb. \" In addition, this article also describes clinical findings: \"A Tinels sign over the course of the SRN is the most common physical examination finding, although the clinician should be mindful that this may also be positive in patients with more proximal pain generators, such as a  lateral antebrachial cutaneous neuritis. Although electrodiagnostic testing is often negative in cases of SRN, it is part of a thorough workup and may be helpful if positive. \"    For the above reasons just described and  based off of 's reasoning for not allowing the condition of the radial sensory neuritis or more commonly known as Wartenberg's syndrome it is my medical opinion that the condition should be allowed as his documentation clearly does not address radial sensory nerve neuritis. Patient with a radial nerve palsy would present with weakness of wrist extension, thumb extension and finger extension at the metacarpophalangeal joints. Patients with radial tunnel syndrome would present with pain and numbness in the radial sensory nerve distribution. Patients with radial sensory neuritis or Wartenberg syndrome would present with symptoms of  pain and dysathesias as well positive Tinel's sign over the radial sensory nerve.   Therefore within a degree of medical certainty and based off of my clinical examination and documentation in Mr. Valdovinoson [place] chart it is my opinion that radial sensory nerve neuritis is present and should be an allowed condition as flow-through stemming from the original injury of direct trauma to the region of the radial sensory nerve.     Electronically signed by Estephania Kwong MD on 2/21/2023 at 7:18 PM

## 2023-03-10 ENCOUNTER — OFFICE VISIT (OUTPATIENT)
Dept: PAIN MANAGEMENT | Age: 73
End: 2023-03-10

## 2023-03-10 VITALS
DIASTOLIC BLOOD PRESSURE: 60 MMHG | OXYGEN SATURATION: 97 % | HEART RATE: 53 BPM | WEIGHT: 165 LBS | HEIGHT: 69 IN | SYSTOLIC BLOOD PRESSURE: 110 MMHG | BODY MASS INDEX: 24.44 KG/M2 | TEMPERATURE: 97.1 F

## 2023-03-10 DIAGNOSIS — M65.4 RADIAL STYLOID TENOSYNOVITIS: ICD-10-CM

## 2023-03-10 DIAGNOSIS — M79.2 NEURALGIA AND NEURITIS: Primary | ICD-10-CM

## 2023-03-10 DIAGNOSIS — M77.8 ENTHESOPATHY OF ELBOW: ICD-10-CM

## 2023-03-10 RX ORDER — AMMONIUM LACTATE 12 G/100G
LOTION TOPICAL
COMMUNITY
Start: 2023-01-16

## 2023-03-10 RX ORDER — PREGABALIN 100 MG/1
100 CAPSULE ORAL 2 TIMES DAILY
Qty: 60 CAPSULE | Refills: 2 | Status: SHIPPED | OUTPATIENT
Start: 2023-03-10 | End: 2023-06-08

## 2023-03-10 RX ORDER — DULOXETIN HYDROCHLORIDE 60 MG/1
60 CAPSULE, DELAYED RELEASE ORAL NIGHTLY
Qty: 30 CAPSULE | Refills: 2 | Status: SHIPPED | OUTPATIENT
Start: 2023-03-10

## 2023-03-10 NOTE — PROGRESS NOTES
Do you currently have any of the following:    Fever: No  Headache:  No  Cough: No  Shortness of breath: No  Exposed to anyone with these symptoms: No                                                                                                                Suzie Mancera. presents to the [facility] on 3/10/2023. Beena Zhao is complaining of pain left elbow, wrist, and thumb, both knees. The pain is constant. The pain is described as throbbing. Pain is rated on his best day at a 8, on his worst day at a 9, and on average at a 8 on the VAS scale. He took his last dose of  none  . Beena Zhao does not have issues with constipation. Any procedures since your last visit: No, with  % relief. He is not on NSAIDS and  is not on anticoagulation medications to include none and is managed by . Pacemaker or defibrillator: No Physician managing device is . Medication Contract and Consent for Opioid Use Documents Filed        No documents found                       Temp 97.1 °F (36.2 °C)   Ht 5' 9\" (1.753 m)   Wt 165 lb (74.8 kg)   BMI 24.37 kg/m²      No LMP for male patient.

## 2023-03-10 NOTE — PROGRESS NOTES
[facility]  [address] [facility], [address] [place] [address]  [phone]    Follow up Note      Jose E Harrison. Date of Visit:  3/10/2023    CC:  Patient presents for follow up   Chief Complaint   Patient presents with    Arm Pain     L elbow, wrist, thumb    Knee Pain     Both knees       HPI:    Left UE pain. Work related injury in 2015- injury to the left hand- s/p surgery initially-Did well. Repeat injury and subsequent treatment with Dr. Vola Brittle. [facility] case  POR : Dr. Jennifer Hidalgo     S/P revision thumb basal joint arthroplasty with use of Arthrex mini tight rope system as well as concurrent wrist arthroscopy and debridement of peripheral/central tear of TFCC - in May 2021 by Dr. Vola Brittle. S/P ulnar nerve decompression at elbow with anterior subcutaneous transposition, carpal tunnel release, and radial sensory nerve neurectomy at the wrist on 6/8/2022. Continues to have pain and sensitivity over the radial foraram/ hand, wrist and over the scar at the medial elbow. Has done PT/ OT. Pain is constant and is described      Pain causes functional limitations/ limits Adl's : Yes     Priro treatment at [facility] pain clinic- was on chronic opioids for low back pain issues. Previous treatments:   Physical Therapy : yes,       Medications: - NSAID's : yes                        - Membrane stabilizers : yes                        - Opioids : yes,                        - Adjuvants or Others : yes      Surgeries: yes     Anticoagulation medications: no      H/O Smoking: no  H/O alcohol abuse : no  H/O Illicit drug use : no     Employment: employed- [place] [employer]     EMG: 10/21/2021:  EMG was done today and showed a very mild radial sensory neuropathy, mild left carpal tunnel syndrome, mild left cubital tunnel syndrome. The patient was educated about the diagnosis and the prognosis. Imaging:   Pertinent image findings reviewed.      OARRS report[de-identified] reviewed    Past Medical History:   Diagnosis Date    Cancer ([address] 07/2018    prostate     History of blood transfusion     1985 after \"ulcer surgery\"    Hyperlipidemia     Hypertension     Left wrist pain     Pain     low back, sacral       Past Surgical History:   Procedure Laterality Date    ARM SURGERY Left 6/8/2022    LEFT ULNAR NERVE DECOMPRESSION AT ELBOW WITH TRANSPOSITION, LEFT RADIAL SENSORY NEUROLYSIS AND NEURECTOMY WITH NERVE CAP, REVISION DE QUERVAIN'S RELEASE performed by Fernando Wesley MD at [facility] Left 6/8/2022    LEFT CARPAL TUNNEL RELEASE performed by Fernando Wesley MD at [address] [place] [address] Right 07/31/2018    CATARACT REMOVAL WITH IMPLANT Left 07/02/2019    CYST REMOVAL      lt. axilla    HAND SURGERY      HEMORRHOID SURGERY      HERNIA REPAIR      INTRACAPSULAR CATARACT EXTRACTION Left 7/2/2019    LEFT EYE CATARACT EMULSIFICATION IOL IMPLANT - COMPLEX performed by Jenn Alcala MD at [facility] [facility] Bilateral 08/17/2017    bilateral sacroiliac injection #1    NERVE BLOCK Bilateral 10/25/2017    Bilateral lumbar transforaminal epidural steroid injection    NERVE BLOCK Bilateral 11/02/2017    lumbar transforaminal epidural steriod injection #2    OTHER SURGICAL HISTORY  07-13-12    excision of cyst right 5th toe    [place] XCAPSL CTRC RMVL INSJ IO LENS PROSTH W/O ECP Right 7/31/2018    CATARACT EXTRACTION WITH IOL RIGHT EYE performed by Jenn Alcala MD at [address] [place] [address]      ulcer repair    WRIST ARTHROPLASTY Left 5/12/2021    WITH REVISION [address] ARTHROPLASTY AND MINI TIGHTROPE APPLICATION APPLICATION EXTENSOR TENOSYNOVECTOMY AND FCR TENOTOMY  ; PARTIAL TRAPEZOIDECTOMY performed by Fernando Wesley MD at [address] [facility] ARTHROSCOPY Left 5/12/2021    LEFT WRIST ARTHROSCOPY performed by Fernando Wesley MD at [address] [facility]       Prior to Admission medications    Medication Sig Start Date End Date Taking? Authorizing Provider   Lidocaine (ZTLIDO) 1.8 % PTCH Place patch to affected area 12 hours on and 12 hours off. 10/4/22  Yes Elizabeth Schilling MD   Lidocaine (ZTLIDO) 1.8 % PTCH Apply to affected area 12 hours on 12 hours off. 10/4/22  Yes Elizabeth Schilling MD   pantoprazole (PROTONIX) 40 MG tablet daily as needed  5/17/20  Yes Historical Provider, MD   simvastatin (ZOCOR) 20 MG tablet Take by mouth daily  3/30/20  Yes Historical Provider, MD   amLODIPine (NORVASC) 10 MG tablet Take 10 mg by mouth daily   Yes Historical Provider, MD   ammonium lactate (LAC-HYDRIN) 12 % lotion APPLY TWICE A DAY 1/16/23   Historical Provider, MD   pregabalin (LYRICA) 75 MG capsule Take 1 capsule by mouth 2 times daily for 90 days.   Patient not taking: Reported on 3/10/2023 11/15/22 2/13/23  Elizabeth Schilling MD   DULoxetine (CYMBALTA) 60 MG extended release capsule Take 1 capsule by mouth nightly  Patient not taking: Reported on 3/10/2023 11/15/22   Elizabeth Schilling MD   metoprolol succinate (TOPROL XL) 25 MG extended release tablet Take 1 tablet by mouth daily  Patient not taking: Reported on 3/10/2023    Historical Provider, MD       Allergies   Allergen Reactions    Codeine Nausea And Vomiting and Other (See Comments)     Dizziness, light headed    Motrin [Ibuprofen Micronized] Nausea And Vomiting     GI upset       Social History     Socioeconomic History    Marital status:      Spouse name: Not on file    Number of children: Not on file    Years of education: Not on file    Highest education level: Not on file   Occupational History    Not on file   Tobacco Use    Smoking status: Never    Smokeless tobacco: Never   Vaping Use    Vaping Use: Never used   Substance and Sexual Activity    Alcohol use: No    Drug use: No    Sexual activity: Not on file   Other Topics Concern    Not on file   Social History Narrative    Not on file     Social Determinants of Health     Financial Resource Strain: Not on file   Food Insecurity: Not on file   Transportation Needs: Not on file   Physical Activity: Not on file   Stress: Not on file   Social Connections: Not on file   Intimate Partner Violence: Not on file   Housing Stability: Not on file       Family History   Problem Relation Age of Onset    Cancer Mother     Cancer Father        REVIEW OF SYSTEMS:     Heena Padron denies fever/chills, chest pain, shortness of breath, new bowel or bladder complaints. All other review of systems was negative. PHYSICAL EXAMINATION:      /60   Pulse 53   Temp 97.1 °F (36.2 °C)   Ht 5' 9\" (1.753 m)   Wt 165 lb (74.8 kg)   SpO2 97%   BMI 24.37 kg/m²      General:       General appearance:  Pleasant and well-hydrated, in no distress and A & O x 3  Build:Normal Weight  Function: Rises from seated position easily     HEENT:     Head:normocephalic, atraumatic    Lungs:     Breathing:normal breathing pattern      CVS:     RRR     Abdomen:     Shape:non-distended and normal     Cervical spine:     Inspection:normal  Palpation:tenderness paravertebral muscles, tenderness trapezium, left, right : no  Range of motion:no pain     Thoracic spine:                Spine inspection:normal   Range of motion:normal      Lumbar spine:     Spine inspection: Normal      Musculoskeletal:     Trigger points No      Extremities:     Tremors:None bilaterally upper and lower  Edema:no     Left Upper extremity:  Scar seen over the left lateral wrist, anterior wrist, medial elbow- healed well  Hypersensitivity over the scars noted  ROM - is reduced  Weakness of the left hand  +  Loss of thenar eminence noted     Neurological: RAMOS     Dermatology:     Skin:no rashes or lesions noted     Assessment/Plan:       Diagnosis Orders   1. Neuralgia and neuritis          2. Radial styloid tenosynovitis          3. Enthesopathy of elbow              74 y.o.  male with H/o left UE pain. S/p surgery as detailed. [facility] case.      Feature of neuralgia- early CRPS-2      Has tried gabapentin. Lyrica 75 mg bid- tried for one month. Will increase the dose to 100 mg bid. Cymbalta 60 mg Q hs. Refill given. ZT lido patch- not much help    Compound cream- mild help. PT/OT. Will request c-9 for  left stellate ganglion block under fluoroscopy X 2. Need moderate sedation since he is very anxious of needles. Counseling : Patient encouraged to stay active and to  continue Regular home exercise program as tolerated - stretching / strengthening. Treatment plan discussed with the patient including medication and procedure side effects. Controlled Substances Monitoring: OARRS reviewed.      Saundra Lucia MD

## 2023-03-30 ENCOUNTER — TELEPHONE (OUTPATIENT)
Dept: PAIN MANAGEMENT | Age: 73
End: 2023-03-30

## 2023-03-30 NOTE — TELEPHONE ENCOUNTER
Received C-9 authorization for stellate ganglion blocks with disclaimer. Spoke with Helder Alcala and he is choosing to wait until his hearing regarding his further claim allowance for G56.32 to be added to his claim. Advised him to call back once he knows outcome of hearing.

## 2023-04-10 ENCOUNTER — TELEPHONE (OUTPATIENT)
Dept: PAIN MANAGEMENT | Age: 73
End: 2023-04-10

## 2023-04-25 ENCOUNTER — OFFICE VISIT (OUTPATIENT)
Dept: ENT CLINIC | Age: 73
End: 2023-04-25
Payer: MEDICARE

## 2023-04-25 VITALS
HEART RATE: 48 BPM | WEIGHT: 178.9 LBS | DIASTOLIC BLOOD PRESSURE: 84 MMHG | BODY MASS INDEX: 26.5 KG/M2 | HEIGHT: 69 IN | SYSTOLIC BLOOD PRESSURE: 131 MMHG

## 2023-04-25 DIAGNOSIS — E21.3 HYPERPARATHYROIDISM (HCC): Primary | ICD-10-CM

## 2023-04-25 PROCEDURE — 99204 OFFICE O/P NEW MOD 45 MIN: CPT | Performed by: OTOLARYNGOLOGY

## 2023-04-25 PROCEDURE — 1123F ACP DISCUSS/DSCN MKR DOCD: CPT | Performed by: OTOLARYNGOLOGY

## 2023-04-25 RX ORDER — PROPRANOLOL HYDROCHLORIDE 20 MG/1
20 TABLET ORAL 3 TIMES DAILY
COMMUNITY
End: 2023-05-03 | Stop reason: ALTCHOICE

## 2023-04-26 ENCOUNTER — TELEPHONE (OUTPATIENT)
Dept: PAIN MANAGEMENT | Age: 73
End: 2023-04-26

## 2023-04-26 ENCOUNTER — PREP FOR PROCEDURE (OUTPATIENT)
Dept: PAIN MANAGEMENT | Age: 73
End: 2023-04-26

## 2023-04-26 PROBLEM — G56.32 RADIAL NEUROPATHY, LEFT: Status: ACTIVE | Noted: 2023-04-26

## 2023-04-26 RX ORDER — SODIUM CHLORIDE 9 MG/ML
INJECTION, SOLUTION INTRAVENOUS PRN
Status: CANCELLED | OUTPATIENT
Start: 2023-04-26

## 2023-04-26 RX ORDER — SODIUM CHLORIDE 0.9 % (FLUSH) 0.9 %
5-40 SYRINGE (ML) INJECTION PRN
Status: CANCELLED | OUTPATIENT
Start: 2023-04-26

## 2023-04-26 RX ORDER — SODIUM CHLORIDE 0.9 % (FLUSH) 0.9 %
5-40 SYRINGE (ML) INJECTION EVERY 12 HOURS SCHEDULED
Status: CANCELLED | OUTPATIENT
Start: 2023-04-26

## 2023-04-26 NOTE — TELEPHONE ENCOUNTER
Call to Ivelisse Silva. that procedure was approved for 5/9/2023 and that [facility] should call him a few days before for the pre op call and after 3:00 PM the business day before with the arrival time. Instructed Troy to hold ibuprofen for 24 hours, naprosyn for 4 days and any aspirin containing products or fish oil for 7 days. Instructed to call office back if any questions. Hamlet Masters verbalized understanding.     Electronically signed by Jayna Mccann RN on 4/26/2023 at 9:34 AM

## 2023-04-28 DIAGNOSIS — E21.3 HYPERPARATHYROIDISM (HCC): ICD-10-CM

## 2023-04-28 LAB
CA-I BLD-SCNC: 1.38 MMOL/L (ref 1.15–1.33)
PTH-INTACT SERPL-MCNC: 112 PG/ML (ref 15–65)
VITAMIN D 25-HYDROXY: 38 NG/ML (ref 30–100)

## 2023-05-02 ENCOUNTER — TELEPHONE (OUTPATIENT)
Dept: ORTHOPEDIC SURGERY | Age: 73
End: 2023-05-02

## 2023-05-02 NOTE — TELEPHONE ENCOUNTER
Pt called to see if he is to come in sooner for his US Thyroid results. He completed the [address] on 04/28 he stated. He has an appt on 05/26 at 8am for 1 mo US thyroid and labs follow up and thought he needed to be seen sooner. Please advise.

## 2023-05-04 ENCOUNTER — ANESTHESIA EVENT (OUTPATIENT)
Dept: OPERATING ROOM | Age: 73
End: 2023-05-04
Payer: COMMERCIAL

## 2023-05-04 ASSESSMENT — LIFESTYLE VARIABLES: SMOKING_STATUS: 0

## 2023-05-09 ENCOUNTER — ANESTHESIA (OUTPATIENT)
Dept: OPERATING ROOM | Age: 73
End: 2023-05-09
Payer: COMMERCIAL

## 2023-05-09 ENCOUNTER — HOSPITAL ENCOUNTER (OUTPATIENT)
Age: 73
Setting detail: OUTPATIENT SURGERY
Discharge: HOME | End: 2023-05-09
Attending: ANESTHESIOLOGY | Admitting: ANESTHESIOLOGY
Payer: COMMERCIAL

## 2023-05-09 ENCOUNTER — HOSPITAL ENCOUNTER (OUTPATIENT)
Dept: OPERATING ROOM | Age: 73
Setting detail: OUTPATIENT SURGERY
Discharge: HOME | End: 2023-05-09
Attending: ANESTHESIOLOGY
Payer: COMMERCIAL

## 2023-05-09 VITALS
HEIGHT: 69 IN | TEMPERATURE: 98.6 F | DIASTOLIC BLOOD PRESSURE: 80 MMHG | RESPIRATION RATE: 14 BRPM | BODY MASS INDEX: 26 KG/M2 | HEART RATE: 68 BPM | WEIGHT: 175.5 LBS | SYSTOLIC BLOOD PRESSURE: 150 MMHG | OXYGEN SATURATION: 96 %

## 2023-05-09 DIAGNOSIS — S24.4XXA: ICD-10-CM

## 2023-05-09 DIAGNOSIS — G56.32 RADIAL NEUROPATHY, LEFT: ICD-10-CM

## 2023-05-09 PROCEDURE — 3600000005 HC SURGERY LEVEL 5 BASE: Performed by: ANESTHESIOLOGY

## 2023-05-09 PROCEDURE — 6360000002 HC RX W HCPCS: Performed by: ANESTHESIOLOGY

## 2023-05-09 PROCEDURE — 3600000015 HC SURGERY LEVEL 5 ADDTL 15MIN: Performed by: ANESTHESIOLOGY

## 2023-05-09 PROCEDURE — 3700000001 HC ADD 15 MINUTES (ANESTHESIA): Performed by: ANESTHESIOLOGY

## 2023-05-09 PROCEDURE — 64510 N BLOCK STELLATE GANGLION: CPT | Performed by: ANESTHESIOLOGY

## 2023-05-09 PROCEDURE — 6360000002 HC RX W HCPCS: Performed by: NURSE ANESTHETIST, CERTIFIED REGISTERED

## 2023-05-09 PROCEDURE — 2709999900 HC NON-CHARGEABLE SUPPLY: Performed by: ANESTHESIOLOGY

## 2023-05-09 PROCEDURE — 3700000000 HC ANESTHESIA ATTENDED CARE: Performed by: ANESTHESIOLOGY

## 2023-05-09 PROCEDURE — 3209999900 FLUORO FOR SURGICAL PROCEDURES

## 2023-05-09 PROCEDURE — 2580000003 HC RX 258: Performed by: ANESTHESIOLOGY

## 2023-05-09 PROCEDURE — 7100000011 HC PHASE II RECOVERY - ADDTL 15 MIN: Performed by: ANESTHESIOLOGY

## 2023-05-09 PROCEDURE — 7100000010 HC PHASE II RECOVERY - FIRST 15 MIN: Performed by: ANESTHESIOLOGY

## 2023-05-09 PROCEDURE — 6360000004 HC RX CONTRAST MEDICATION: Performed by: ANESTHESIOLOGY

## 2023-05-09 PROCEDURE — 2500000003 HC RX 250 WO HCPCS: Performed by: ANESTHESIOLOGY

## 2023-05-09 RX ORDER — SODIUM CHLORIDE 0.9 % (FLUSH) 0.9 %
5-40 SYRINGE (ML) INJECTION PRN
Status: CANCELLED | OUTPATIENT
Start: 2023-05-09

## 2023-05-09 RX ORDER — SODIUM CHLORIDE 9 MG/ML
INJECTION, SOLUTION INTRAVENOUS PRN
Status: CANCELLED | OUTPATIENT
Start: 2023-05-09

## 2023-05-09 RX ORDER — SODIUM CHLORIDE 0.9 % (FLUSH) 0.9 %
5-40 SYRINGE (ML) INJECTION PRN
Status: DISCONTINUED | OUTPATIENT
Start: 2023-05-09 | End: 2023-05-09 | Stop reason: HOSPADM

## 2023-05-09 RX ORDER — BUPIVACAINE HYDROCHLORIDE 2.5 MG/ML
INJECTION, SOLUTION EPIDURAL; INFILTRATION; INTRACAUDAL PRN
Status: DISCONTINUED | OUTPATIENT
Start: 2023-05-09 | End: 2023-05-09 | Stop reason: ALTCHOICE

## 2023-05-09 RX ORDER — SODIUM CHLORIDE 0.9 % (FLUSH) 0.9 %
5-40 SYRINGE (ML) INJECTION EVERY 12 HOURS SCHEDULED
Status: CANCELLED | OUTPATIENT
Start: 2023-05-09

## 2023-05-09 RX ORDER — MIDAZOLAM HYDROCHLORIDE 1 MG/ML
INJECTION INTRAMUSCULAR; INTRAVENOUS PRN
Status: DISCONTINUED | OUTPATIENT
Start: 2023-05-09 | End: 2023-05-09 | Stop reason: SDUPTHER

## 2023-05-09 RX ORDER — SODIUM CHLORIDE, SODIUM LACTATE, POTASSIUM CHLORIDE, CALCIUM CHLORIDE 600; 310; 30; 20 MG/100ML; MG/100ML; MG/100ML; MG/100ML
INJECTION, SOLUTION INTRAVENOUS CONTINUOUS
Status: DISCONTINUED | OUTPATIENT
Start: 2023-05-09 | End: 2023-05-09 | Stop reason: HOSPADM

## 2023-05-09 RX ORDER — SODIUM CHLORIDE 0.9 % (FLUSH) 0.9 %
5-40 SYRINGE (ML) INJECTION EVERY 12 HOURS SCHEDULED
Status: DISCONTINUED | OUTPATIENT
Start: 2023-05-09 | End: 2023-05-09 | Stop reason: HOSPADM

## 2023-05-09 RX ORDER — LIDOCAINE HYDROCHLORIDE 5 MG/ML
INJECTION, SOLUTION INFILTRATION; INTRAVENOUS PRN
Status: DISCONTINUED | OUTPATIENT
Start: 2023-05-09 | End: 2023-05-09 | Stop reason: ALTCHOICE

## 2023-05-09 RX ORDER — FENTANYL CITRATE 50 UG/ML
INJECTION, SOLUTION INTRAMUSCULAR; INTRAVENOUS PRN
Status: DISCONTINUED | OUTPATIENT
Start: 2023-05-09 | End: 2023-05-09 | Stop reason: SDUPTHER

## 2023-05-09 RX ORDER — DIPHENHYDRAMINE HYDROCHLORIDE 50 MG/ML
12.5 INJECTION INTRAMUSCULAR; INTRAVENOUS
Status: CANCELLED | OUTPATIENT
Start: 2023-05-09 | End: 2023-05-10

## 2023-05-09 RX ORDER — SODIUM CHLORIDE 9 MG/ML
INJECTION, SOLUTION INTRAVENOUS PRN
Status: DISCONTINUED | OUTPATIENT
Start: 2023-05-09 | End: 2023-05-09 | Stop reason: HOSPADM

## 2023-05-09 RX ORDER — MEPERIDINE HYDROCHLORIDE 25 MG/ML
12.5 INJECTION INTRAMUSCULAR; INTRAVENOUS; SUBCUTANEOUS ONCE
Status: CANCELLED | OUTPATIENT
Start: 2023-05-09

## 2023-05-09 RX ORDER — DEXAMETHASONE SODIUM PHOSPHATE 10 MG/ML
INJECTION, SOLUTION INTRAMUSCULAR; INTRAVENOUS PRN
Status: DISCONTINUED | OUTPATIENT
Start: 2023-05-09 | End: 2023-05-09 | Stop reason: ALTCHOICE

## 2023-05-09 RX ADMIN — FENTANYL CITRATE 50 MCG: 50 INJECTION INTRAMUSCULAR; INTRAVENOUS at 13:41

## 2023-05-09 RX ADMIN — FENTANYL CITRATE 50 MCG: 50 INJECTION INTRAMUSCULAR; INTRAVENOUS at 13:43

## 2023-05-09 RX ADMIN — SODIUM CHLORIDE, POTASSIUM CHLORIDE, SODIUM LACTATE AND CALCIUM CHLORIDE: 600; 310; 30; 20 INJECTION, SOLUTION INTRAVENOUS at 12:34

## 2023-05-09 RX ADMIN — MIDAZOLAM 2 MG: 1 INJECTION INTRAMUSCULAR; INTRAVENOUS at 13:36

## 2023-05-09 ASSESSMENT — PAIN - FUNCTIONAL ASSESSMENT: PAIN_FUNCTIONAL_ASSESSMENT: NONE - DENIES PAIN

## 2023-05-09 NOTE — H&P
[place]  [address] [facility], [address] [place] [address]  [phone]    Procedure History & Physical      Apurva Quang. HPI:    Patient  is here for Left Stellate ganglion block for left UE pain.   Labs/imaging studies reviewed   All question and concerns addressed including R/B/A associated with the procedure    Past Medical History:   Diagnosis Date    Cancer ([address] 07/2018    prostate,  treatments of chemo at that time    History of blood transfusion     1985 after \"ulcer surgery\"    Hyperlipidemia     Hypertension     Left wrist pain     Pain     low back, sacral       Past Surgical History:   Procedure Laterality Date    ARM SURGERY Left 6/8/2022    LEFT ULNAR NERVE DECOMPRESSION AT ELBOW WITH TRANSPOSITION, LEFT RADIAL SENSORY NEUROLYSIS AND NEURECTOMY WITH NERVE CAP, REVISION DE QUERVAIN'S RELEASE performed by Eliel Sawyer MD at [address] Left 6/8/2022    LEFT CARPAL TUNNEL RELEASE performed by Eliel Sawyer MD at [facility] Right 07/31/2018    CATARACT REMOVAL WITH IMPLANT Left 07/02/2019    CYST REMOVAL      lt. axilla    HAND SURGERY      HEMORRHOID SURGERY      HERNIA REPAIR      INTRACAPSULAR CATARACT EXTRACTION Left 7/2/2019    LEFT EYE CATARACT EMULSIFICATION IOL IMPLANT - COMPLEX performed by Hipolito Gutierrez MD at [facility] Bilateral 08/17/2017    bilateral sacroiliac injection #1    NERVE BLOCK Bilateral 10/25/2017    Bilateral lumbar transforaminal epidural steroid injection    NERVE BLOCK Bilateral 11/02/2017    lumbar transforaminal epidural steriod injection #2    OTHER SURGICAL HISTORY  07-13-12    excision of cyst right 5th toe    NH XCAPSL CTRC RMVL INSJ IO LENS PROSTH W/O ECP Right 7/31/2018    CATARACT EXTRACTION WITH IOL RIGHT EYE performed by Hipolito Gutierrez MD at [facility]      ulcer repair    WRIST ARTHROPLASTY Left 5/12/2021    WITH

## 2023-05-09 NOTE — ANESTHESIA POSTPROCEDURE EVALUATION
Department of Anesthesiology  Postprocedure Note    Patient: Марина Gage.   MRN: [id number]  YOB: 1950  Date of evaluation: 5/9/2023      Procedure Summary     Date: 05/09/23 Room / Location: [address] [place] [address] 04 / [facility]    Anesthesia Start: 1336 Anesthesia Stop: 6261    Procedure: LEFT STELLATE GANGLION BLOCK UNDER FLUOROSCOPY (Left) Diagnosis:       Radial neuropathy, left      Radial styloid tenosynovitis      (Radial neuropathy, left [G56.32])      (Radial styloid tenosynovitis [M65.4])    Surgeons: Adwoa Salinas MD Responsible Provider: Cherylle Mcburney, MD    Anesthesia Type: MAC ASA Status: 2          Anesthesia Type: MAC    Devi Phase I: Devi Score: 10    Devi Phase II: Devi Score: 10      Anesthesia Post Evaluation    Patient location during evaluation: PACU  Patient participation: complete - patient participated  Level of consciousness: awake and alert  Airway patency: patent  Nausea & Vomiting: no nausea and no vomiting  Complications: no  Cardiovascular status: hemodynamically stable  Respiratory status: room air and spontaneous ventilation  Hydration status: stable

## 2023-05-09 NOTE — OP NOTE
Operative Note      Patient: Joann Vazquez YOB: 1950  MRN: [id number]    Date of Procedure: 2023    Pre-Op Diagnosis Codes:     * Radial neuropathy, left [G56.32]     * Radial styloid tenosynovitis [M65.4]  Ulnar neuropathy of the left UE. Post-Op Diagnosis: Same       Procedure(s):  LEFT STELLATE GANGLION BLOCK UNDER FLUOROSCOPY    Surgeon(s):  Rai Gross MD    Assistant:   * No surgical staff found *    Anesthesia: Monitor Anesthesia Care    Estimated Blood Loss (mL): Minimal    Complications: None    Specimens:   * No specimens in log *    Implants:  * No implants in log *      Drains: * No LDAs found *    Findings: good needle placement      Detailed Description of Procedure:   2023    Patient: Joann Park. :  1950  Age:  74 y.o. Sex:  male       PRE-OPERATIVE DIAGNOSIS:   Left upper extremity Neuralgia, neuritis. * Radial neuropathy, left [G56.32]     * Radial styloid tenosynovitis [M65.4]  Ulnar neuropathy of the left UE. POST-OPERATIVE DIAGNOSIS: Same. PROCEDURE PERFORMED:  #1 Left stellate ganglion block done under fluoroscopic guidance. SURGEON: Rai Gross MD    ANESTHESIA: MAC    ESTIMATED BLOOD LOSS: None. BRIEF HISTORY: [place]. comes in today for the first  Left stellate ganglion block under fluoroscopic guidance. The potential complications of this procedure were discussed with him again today. He has elected to undergo the aforementioned procedure. Raymundo complete History & Physical examination were reviewed in depth, a copy of which is in the chart. DESCRIPTION OF PROCEDURE:     . After confirming written and informed consent, a time-out was performed and Raymundo name and date of birth, the procedure to be performed as well as the plan for the location of the needle insertion were confirmed.     The patient was brought into the procedure room and was placed in the supine position on a fluoroscopy

## 2023-05-09 NOTE — DISCHARGE INSTRUCTIONS
[facility]) Pain Management Department  [phone]   Post-Pain Block/ Radiofrequency Home Going Instructions    1-Go home, rest for the remainder of the day  2-Please do not lift over 20 pounds the day of the injection  3-If you received sedation No: alcohol, driving, operating lawn mowers, plows, tractors or other dangerous equipment until next morning. Do not make important decisions or sign legal documents for 24 hours. You may experience light headedness, dizziness, nausea or sleepiness after sedation. Do not stay alone. A responsible adult must be with you for 24 hours. You could be nauseated from the medications you have received. Your IV site may be sore and bruised. 4-No dietary restrictions     5-Resume all medications the same day, blood thinners to be resumed 24 hours after injection    6-Keep the surgical site clean and dry, you may shower the next morning and remove the      dressing. 7- No sitz baths, tub baths or hot tubs/swimming for 24 hours. 8- If you have any pain at the injection site(s), application of an ice pack to the area should be       helpful, 20 minutes on/20 minutes off for next 48 hours. 9- [facility] if you develop.   Fever greater than 100.4 F  Have bleeding or drainage from the puncture site  Have progressive Leg/arm numbness and or weakness  Loss of control of bowel and or bladder (wet/soil yourself)  Severe headache with inability to lift head  10-You may return to work the next day

## 2023-05-18 ASSESSMENT — ENCOUNTER SYMPTOMS
SHORTNESS OF BREATH: 0
VOMITING: 0
COUGH: 0

## 2023-05-26 ENCOUNTER — OFFICE VISIT (OUTPATIENT)
Dept: ENT CLINIC | Age: 73
End: 2023-05-26
Payer: COMMERCIAL

## 2023-05-26 VITALS
SYSTOLIC BLOOD PRESSURE: 123 MMHG | HEART RATE: 47 BPM | DIASTOLIC BLOOD PRESSURE: 75 MMHG | HEIGHT: 69 IN | BODY MASS INDEX: 26.36 KG/M2 | WEIGHT: 178 LBS

## 2023-05-26 DIAGNOSIS — E21.3 HYPERPARATHYROIDISM (HCC): Primary | ICD-10-CM

## 2023-05-26 PROCEDURE — 1123F ACP DISCUSS/DSCN MKR DOCD: CPT | Performed by: OTOLARYNGOLOGY

## 2023-05-26 PROCEDURE — 99213 OFFICE O/P EST LOW 20 MIN: CPT | Performed by: OTOLARYNGOLOGY

## 2023-05-26 ASSESSMENT — ENCOUNTER SYMPTOMS
VOMITING: 0
HOARSE VOICE: 0
SORE THROAT: 0
VOICE CHANGE: 0
SINUS PAIN: 0
SHORTNESS OF BREATH: 0
COUGH: 0
SINUS PRESSURE: 0
TROUBLE SWALLOWING: 0

## 2023-06-08 RX ORDER — DULOXETIN HYDROCHLORIDE 60 MG/1
CAPSULE, DELAYED RELEASE ORAL
Qty: 90 CAPSULE | OUTPATIENT
Start: 2023-06-08

## 2023-06-09 ENCOUNTER — PREP FOR PROCEDURE (OUTPATIENT)
Dept: PAIN MANAGEMENT | Age: 73
End: 2023-06-09

## 2023-06-09 ENCOUNTER — OFFICE VISIT (OUTPATIENT)
Dept: PAIN MANAGEMENT | Age: 73
End: 2023-06-09
Payer: COMMERCIAL

## 2023-06-09 VITALS
OXYGEN SATURATION: 96 % | WEIGHT: 168 LBS | HEART RATE: 68 BPM | TEMPERATURE: 97.1 F | SYSTOLIC BLOOD PRESSURE: 130 MMHG | DIASTOLIC BLOOD PRESSURE: 78 MMHG | RESPIRATION RATE: 18 BRPM | BODY MASS INDEX: 24.88 KG/M2 | HEIGHT: 69 IN

## 2023-06-09 DIAGNOSIS — G56.32 LESION OF LEFT RADIAL NERVE: ICD-10-CM

## 2023-06-09 DIAGNOSIS — M18.12 ARTHRITIS OF CARPOMETACARPAL (CMC) JOINT OF LEFT THUMB: ICD-10-CM

## 2023-06-09 DIAGNOSIS — M79.2 NEURALGIA AND NEURITIS: ICD-10-CM

## 2023-06-09 DIAGNOSIS — M77.8 ENTHESOPATHY OF ELBOW: Primary | ICD-10-CM

## 2023-06-09 DIAGNOSIS — G56.32 LESION OF LEFT RADIAL NERVE: Primary | ICD-10-CM

## 2023-06-09 DIAGNOSIS — S60.222S CONTUSION OF LEFT HAND, SEQUELA: ICD-10-CM

## 2023-06-09 DIAGNOSIS — M65.4 RADIAL STYLOID TENOSYNOVITIS: ICD-10-CM

## 2023-06-09 DIAGNOSIS — M77.8 ENTHESOPATHY OF ELBOW: ICD-10-CM

## 2023-06-09 PROCEDURE — 99213 OFFICE O/P EST LOW 20 MIN: CPT | Performed by: ANESTHESIOLOGY

## 2023-06-09 RX ORDER — SODIUM CHLORIDE 0.9 % (FLUSH) 0.9 %
5-40 SYRINGE (ML) INJECTION EVERY 12 HOURS SCHEDULED
Status: CANCELLED | OUTPATIENT
Start: 2023-06-09

## 2023-06-09 RX ORDER — SODIUM CHLORIDE 0.9 % (FLUSH) 0.9 %
5-40 SYRINGE (ML) INJECTION PRN
Status: CANCELLED | OUTPATIENT
Start: 2023-06-09

## 2023-06-09 RX ORDER — SODIUM CHLORIDE 9 MG/ML
INJECTION, SOLUTION INTRAVENOUS PRN
Status: CANCELLED | OUTPATIENT
Start: 2023-06-09

## 2023-06-09 NOTE — PROGRESS NOTES
Anai Lomax. presents to the [facility] on 6/9/2023. Leodan Gutierrez is complaining of pain bilateral UE. The pain is constant. The pain is described as throbbing. Pain is rated on his best day at a 7, on his worst day at a 9, and on average at a 8 on the VAS scale. He took his last dose of  (not taking anything for pain)  . Leodan Gutierrez does not have issues with constipation. Any procedures since your last visit: Yes, with 60% relief for 2 weeks following procedure    He is not on NSAIDS and  is not on anticoagulation medications to include none and is managed by NA. Pacemaker or defibrillator: No Physician managing device is NA. Medication Contract and Consent for Opioid Use Documents Filed        No documents found                       There were no vitals taken for this visit. No LMP for male patient.
performed by Lakeisha Perkins MD at [facility]       Prior to Admission medications    Medication Sig Start Date End Date Taking? Authorizing Provider   ammonium lactate (LAC-HYDRIN) 12 % lotion APPLY TWICE A DAY 1/16/23  Yes Historical Provider, MD   Lidocaine (ZTLIDO) 1.8 % PTCH Place patch to affected area 12 hours on and 12 hours off. 10/4/22  Yes Sharmila Mace MD   Lidocaine (ZTLIDO) 1.8 % PTCH Apply to affected area 12 hours on 12 hours off.  10/4/22  Yes Sharmila Mace MD   metoprolol succinate (TOPROL XL) 25 MG extended release tablet Take 1 tablet by mouth daily   Yes Historical Provider, MD   pantoprazole (PROTONIX) 40 MG tablet daily as needed  5/17/20  Yes Historical Provider, MD   simvastatin (ZOCOR) 20 MG tablet Take by mouth daily  3/30/20  Yes Historical Provider, MD   amLODIPine (NORVASC) 10 MG tablet Take 1 tablet by mouth daily   Yes Historical Provider, MD       Allergies   Allergen Reactions    Codeine Nausea And Vomiting and Other (See Comments)     Dizziness, light headed    Motrin [Ibuprofen Micronized] Nausea And Vomiting     GI upset    Acetaminophen        Social History     Socioeconomic History    Marital status:      Spouse name: Not on file    Number of children: Not on file    Years of education: Not on file    Highest education level: Not on file   Occupational History    Not on file   Tobacco Use    Smoking status: Never    Smokeless tobacco: Never   Vaping Use    Vaping Use: Never used   Substance and Sexual Activity    Alcohol use: No    Drug use: No    Sexual activity: Not on file   Other Topics Concern    Not on file   Social History Narrative    Not on file     Social Determinants of Health     Financial Resource Strain: Not on file   Food Insecurity: Not on file   Transportation Needs: Not on file   Physical Activity: Not on file   Stress: Not on file   Social Connections: Not on file   Intimate Partner Violence: Not on file   Housing Stability: Not on file

## 2023-06-29 ENCOUNTER — TELEPHONE (OUTPATIENT)
Dept: PAIN MANAGEMENT | Age: 73
End: 2023-06-29

## 2023-07-03 ENCOUNTER — ANESTHESIA EVENT (OUTPATIENT)
Dept: OPERATING ROOM | Age: 73
End: 2023-07-03
Payer: COMMERCIAL

## 2023-07-03 ASSESSMENT — LIFESTYLE VARIABLES: SMOKING_STATUS: 0

## 2023-07-11 ENCOUNTER — HOSPITAL ENCOUNTER (OUTPATIENT)
Dept: OPERATING ROOM | Age: 73
Setting detail: OUTPATIENT SURGERY
Discharge: HOME | End: 2023-07-11
Attending: ANESTHESIOLOGY
Payer: COMMERCIAL

## 2023-07-11 ENCOUNTER — ANESTHESIA (OUTPATIENT)
Dept: OPERATING ROOM | Age: 73
End: 2023-07-11
Payer: COMMERCIAL

## 2023-07-11 ENCOUNTER — HOSPITAL ENCOUNTER (OUTPATIENT)
Age: 73
Setting detail: OUTPATIENT SURGERY
Discharge: HOME | End: 2023-07-11
Attending: ANESTHESIOLOGY | Admitting: ANESTHESIOLOGY
Payer: COMMERCIAL

## 2023-07-11 VITALS
TEMPERATURE: 97 F | BODY MASS INDEX: 26.07 KG/M2 | RESPIRATION RATE: 16 BRPM | WEIGHT: 176 LBS | HEIGHT: 69 IN | SYSTOLIC BLOOD PRESSURE: 122 MMHG | OXYGEN SATURATION: 98 % | DIASTOLIC BLOOD PRESSURE: 80 MMHG | HEART RATE: 52 BPM

## 2023-07-11 DIAGNOSIS — S24.4XXA: ICD-10-CM

## 2023-07-11 PROCEDURE — 7100000011 HC PHASE II RECOVERY - ADDTL 15 MIN: Performed by: ANESTHESIOLOGY

## 2023-07-11 PROCEDURE — 6360000004 HC RX CONTRAST MEDICATION: Performed by: ANESTHESIOLOGY

## 2023-07-11 PROCEDURE — 2580000003 HC RX 258: Performed by: ANESTHESIOLOGY

## 2023-07-11 PROCEDURE — 2500000003 HC RX 250 WO HCPCS: Performed by: ANESTHESIOLOGY

## 2023-07-11 PROCEDURE — 3700000000 HC ANESTHESIA ATTENDED CARE: Performed by: ANESTHESIOLOGY

## 2023-07-11 PROCEDURE — 6360000002 HC RX W HCPCS: Performed by: ANESTHESIOLOGY

## 2023-07-11 PROCEDURE — 3600000005 HC SURGERY LEVEL 5 BASE: Performed by: ANESTHESIOLOGY

## 2023-07-11 PROCEDURE — 6360000002 HC RX W HCPCS: Performed by: NURSE ANESTHETIST, CERTIFIED REGISTERED

## 2023-07-11 PROCEDURE — 7100000010 HC PHASE II RECOVERY - FIRST 15 MIN: Performed by: ANESTHESIOLOGY

## 2023-07-11 PROCEDURE — 2709999900 HC NON-CHARGEABLE SUPPLY: Performed by: ANESTHESIOLOGY

## 2023-07-11 RX ORDER — DROPERIDOL 2.5 MG/ML
1.25 INJECTION, SOLUTION INTRAMUSCULAR; INTRAVENOUS
Status: DISCONTINUED | OUTPATIENT
Start: 2023-07-11 | End: 2023-07-11 | Stop reason: HOSPADM

## 2023-07-11 RX ORDER — SODIUM CHLORIDE 0.9 % (FLUSH) 0.9 %
5-40 SYRINGE (ML) INJECTION EVERY 12 HOURS SCHEDULED
Status: DISCONTINUED | OUTPATIENT
Start: 2023-07-11 | End: 2023-07-11 | Stop reason: HOSPADM

## 2023-07-11 RX ORDER — FENTANYL CITRATE 50 UG/ML
INJECTION, SOLUTION INTRAMUSCULAR; INTRAVENOUS PRN
Status: DISCONTINUED | OUTPATIENT
Start: 2023-07-11 | End: 2023-07-11 | Stop reason: SDUPTHER

## 2023-07-11 RX ORDER — DEXAMETHASONE SODIUM PHOSPHATE 10 MG/ML
INJECTION, SOLUTION INTRAMUSCULAR; INTRAVENOUS PRN
Status: DISCONTINUED | OUTPATIENT
Start: 2023-07-11 | End: 2023-07-11 | Stop reason: ALTCHOICE

## 2023-07-11 RX ORDER — MEPERIDINE HYDROCHLORIDE 25 MG/ML
12.5 INJECTION INTRAMUSCULAR; INTRAVENOUS; SUBCUTANEOUS ONCE
Status: DISCONTINUED | OUTPATIENT
Start: 2023-07-11 | End: 2023-07-11 | Stop reason: HOSPADM

## 2023-07-11 RX ORDER — SODIUM CHLORIDE 9 MG/ML
INJECTION, SOLUTION INTRAVENOUS PRN
Status: DISCONTINUED | OUTPATIENT
Start: 2023-07-11 | End: 2023-07-11 | Stop reason: HOSPADM

## 2023-07-11 RX ORDER — SODIUM CHLORIDE, SODIUM LACTATE, POTASSIUM CHLORIDE, CALCIUM CHLORIDE 600; 310; 30; 20 MG/100ML; MG/100ML; MG/100ML; MG/100ML
INJECTION, SOLUTION INTRAVENOUS CONTINUOUS
Status: DISCONTINUED | OUTPATIENT
Start: 2023-07-11 | End: 2023-07-11 | Stop reason: HOSPADM

## 2023-07-11 RX ORDER — SODIUM CHLORIDE 0.9 % (FLUSH) 0.9 %
5-40 SYRINGE (ML) INJECTION PRN
Status: DISCONTINUED | OUTPATIENT
Start: 2023-07-11 | End: 2023-07-11 | Stop reason: HOSPADM

## 2023-07-11 RX ORDER — BUPIVACAINE HYDROCHLORIDE 2.5 MG/ML
INJECTION, SOLUTION EPIDURAL; INFILTRATION; INTRACAUDAL PRN
Status: DISCONTINUED | OUTPATIENT
Start: 2023-07-11 | End: 2023-07-11 | Stop reason: ALTCHOICE

## 2023-07-11 RX ORDER — MIDAZOLAM HYDROCHLORIDE 1 MG/ML
INJECTION INTRAMUSCULAR; INTRAVENOUS PRN
Status: DISCONTINUED | OUTPATIENT
Start: 2023-07-11 | End: 2023-07-11 | Stop reason: SDUPTHER

## 2023-07-11 RX ORDER — DIPHENHYDRAMINE HYDROCHLORIDE 50 MG/ML
12.5 INJECTION INTRAMUSCULAR; INTRAVENOUS
Status: DISCONTINUED | OUTPATIENT
Start: 2023-07-11 | End: 2023-07-11 | Stop reason: HOSPADM

## 2023-07-11 RX ORDER — FENTANYL CITRATE 0.05 MG/ML
50 INJECTION, SOLUTION INTRAMUSCULAR; INTRAVENOUS EVERY 5 MIN PRN
Status: DISCONTINUED | OUTPATIENT
Start: 2023-07-11 | End: 2023-07-11 | Stop reason: HOSPADM

## 2023-07-11 RX ORDER — LIDOCAINE HYDROCHLORIDE 5 MG/ML
INJECTION, SOLUTION INFILTRATION; INTRAVENOUS PRN
Status: DISCONTINUED | OUTPATIENT
Start: 2023-07-11 | End: 2023-07-11 | Stop reason: ALTCHOICE

## 2023-07-11 RX ADMIN — FENTANYL CITRATE 50 MCG: 50 INJECTION INTRAMUSCULAR; INTRAVENOUS at 14:07

## 2023-07-11 RX ADMIN — MIDAZOLAM 2 MG: 1 INJECTION INTRAMUSCULAR; INTRAVENOUS at 14:06

## 2023-07-11 RX ADMIN — FENTANYL CITRATE 50 MCG: 50 INJECTION INTRAMUSCULAR; INTRAVENOUS at 14:08

## 2023-07-11 RX ADMIN — SODIUM CHLORIDE, POTASSIUM CHLORIDE, SODIUM LACTATE AND CALCIUM CHLORIDE: 600; 310; 30; 20 INJECTION, SOLUTION INTRAVENOUS at 12:49

## 2023-07-11 ASSESSMENT — PAIN - FUNCTIONAL ASSESSMENT: PAIN_FUNCTIONAL_ASSESSMENT: 0-10

## 2023-07-11 NOTE — ANESTHESIA POSTPROCEDURE EVALUATION
Department of Anesthesiology  Postprocedure Note    Patient: Topher Dillon.   MRN: [id number]  YOB: 1950  Date of evaluation: 7/11/2023      Procedure Summary     Date: 07/11/23 Room / Location: [address] [place] [address] Josh Reeder    Anesthesia Start: 3749 Anesthesia Stop: 5063    Procedure: left stellate ganglion block under fluoroscopy guidance SEDATION (Left) Diagnosis:       Lesion of left radial nerve      (Lesion of left radial nerve [G56.32])    Surgeons: Otilio Leos MD Responsible Provider: Tyler Foss MD    Anesthesia Type: MAC ASA Status: 2          Anesthesia Type: MAC    Devi Phase I: Devi Score: 10    Devi Phase II: Devi Score: 10      Anesthesia Post Evaluation    Patient location during evaluation: PACU  Patient participation: complete - patient participated  Level of consciousness: awake and alert  Airway patency: patent  Nausea & Vomiting: no nausea and no vomiting  Complications: no  Cardiovascular status: hemodynamically stable and bradycardic  Respiratory status: room air and spontaneous ventilation  Hydration status: stable  Comments: Temporary dizziness in PACU

## 2023-07-11 NOTE — H&P
[address], [facility] [address] [facility], [address] [place] [address]  [phone]    Procedure History & Physical      Treasure [place]. HPI:    Patient  is here for stellate ganglion block for left UE pain.   Labs/imaging studies reviewed   All question and concerns addressed including R/B/A associated with the procedure    Past Medical History:   Diagnosis Date    Cancer ([address]) 07/2018    prostate,  treatments of chemo at that time    History of blood transfusion     1985 after \"ulcer surgery\"    Hyperlipidemia     Hypertension     Left wrist pain     Pain     low back, sacral       Past Surgical History:   Procedure Laterality Date    ARM SURGERY Left 6/8/2022    LEFT ULNAR NERVE DECOMPRESSION AT ELBOW WITH TRANSPOSITION, LEFT RADIAL SENSORY NEUROLYSIS AND NEURECTOMY WITH NERVE CAP, REVISION DE QUERVAIN'S RELEASE performed by Adela Jose MD at [facility] Left 6/8/2022    LEFT CARPAL TUNNEL RELEASE performed by Adela Jose MD at [address] [place] [address] Right 07/31/2018    CATARACT REMOVAL WITH IMPLANT Left 07/02/2019    CYST REMOVAL      lt. axilla    HAND SURGERY      HEMORRHOID SURGERY      HERNIA REPAIR      INTRACAPSULAR CATARACT EXTRACTION Left 7/2/2019    LEFT EYE CATARACT EMULSIFICATION IOL IMPLANT - COMPLEX performed by Azeem Regan MD at [facility] Bilateral 08/17/2017    bilateral sacroiliac injection #1    NERVE BLOCK Bilateral 10/25/2017    Bilateral lumbar transforaminal epidural steroid injection    NERVE BLOCK Bilateral 11/02/2017    lumbar transforaminal epidural steriod injection #2    NERVE BLOCK Left 5/9/2023    LEFT STELLATE GANGLION BLOCK UNDER FLUOROSCOPY performed by Sheila Kang MD at [address]  07-13-12    excision of cyst right 5th toe    MA XCAPSL CTRC RMVL INSJ IO LENS PROSTH W/O ECP Right 7/31/2018    CATARACT EXTRACTION WITH IOL RIGHT EYE

## 2023-07-11 NOTE — OP NOTE
Operative Note      Patient: Ronel Singletary YOB: 1950  MRN: [id number]    Date of Procedure: 2023    Pre-Op Diagnosis Codes:     * Lesion of left radial nerve [G56.32]   * Radial neuropathy, left [G56.32]     * Radial styloid tenosynovitis [M65.4]  Ulnar neuropathy of the left UE. Post-Op Diagnosis: Same       Procedure(s):  left stellate ganglion block under fluoroscopy guidance SEDATION    Surgeon(s):  Dutch Morton MD    Assistant:   * No surgical staff found *    Anesthesia: Monitor Anesthesia Care    Estimated Blood Loss (mL): Minimal    Complications: None    Specimens:   * No specimens in log *    Implants:  * No implants in log *      Drains: * No LDAs found *    Findings: good needle placement      Detailed Description of Procedure:   24    Patient: Ronel Duran. :  1950  Age:  74 y.o. Sex:  male         PRE-OPERATIVE DIAGNOSIS:   Left upper extremity Neuralgia, neuritis. * Radial neuropathy, left [G56.32]     * Radial styloid tenosynovitis [M65.4]  Ulnar neuropathy of the left UE. POST-OPERATIVE DIAGNOSIS: Same. PROCEDURE PERFORMED:  #2 Left stellate ganglion block done under fluoroscopic guidance. SURGEON: Dutch Morton MD     ANESTHESIA: MAC     ESTIMATED BLOOD LOSS: None. BRIEF HISTORY: Ronel Singletary comes in today for the first  Left stellate ganglion block under fluoroscopic guidance. The potential complications of this procedure were discussed with him again today. He has elected to undergo the aforementioned procedure. Toby complete History & Physical examination were reviewed in depth, a copy of which is in the chart. DESCRIPTION OF PROCEDURE:     After confirming written and informed consent, a time-out was performed and Toby name and date of birth, the procedure to be performed as well as the plan for the location of the needle insertion were confirmed.      The patient was brought into the

## 2023-07-18 ENCOUNTER — TELEPHONE (OUTPATIENT)
Dept: ORTHOPEDIC SURGERY | Age: 73
End: 2023-07-18

## 2023-07-18 ENCOUNTER — OFFICE VISIT (OUTPATIENT)
Dept: ORTHOPEDIC SURGERY | Age: 73
End: 2023-07-18
Payer: COMMERCIAL

## 2023-07-18 VITALS — BODY MASS INDEX: 26.07 KG/M2 | TEMPERATURE: 98 F | WEIGHT: 176 LBS | HEIGHT: 69 IN

## 2023-07-18 DIAGNOSIS — S40.011A CONTUSION OF RIGHT SCAPULA, INITIAL ENCOUNTER: ICD-10-CM

## 2023-07-18 DIAGNOSIS — S46.911A STRAIN OF RIGHT SHOULDER, INITIAL ENCOUNTER: Primary | ICD-10-CM

## 2023-07-18 PROCEDURE — 99203 OFFICE O/P NEW LOW 30 MIN: CPT | Performed by: ORTHOPAEDIC SURGERY

## 2023-07-18 PROCEDURE — 1123F ACP DISCUSS/DSCN MKR DOCD: CPT | Performed by: ORTHOPAEDIC SURGERY

## 2023-07-18 NOTE — TELEPHONE ENCOUNTER
Pt thinks he may have left his glasses in the exam room today, when he saw Dr Ese Edge. Can you please call him and let him know if they are or are not there? Thanks.

## 2023-08-24 ENCOUNTER — OFFICE VISIT (OUTPATIENT)
Dept: ORTHOPEDIC SURGERY | Age: 73
End: 2023-08-24

## 2023-08-24 DIAGNOSIS — S40.011A CONTUSION OF RIGHT SCAPULA, INITIAL ENCOUNTER: ICD-10-CM

## 2023-08-24 DIAGNOSIS — S46.911A STRAIN OF RIGHT SHOULDER, INITIAL ENCOUNTER: Primary | ICD-10-CM

## 2023-08-24 RX ORDER — TRIAMCINOLONE ACETONIDE 40 MG/ML
40 INJECTION, SUSPENSION INTRA-ARTICULAR; INTRAMUSCULAR ONCE
Status: COMPLETED | OUTPATIENT
Start: 2023-08-24 | End: 2023-08-24

## 2023-08-24 RX ADMIN — TRIAMCINOLONE ACETONIDE 40 MG: 40 INJECTION, SUSPENSION INTRA-ARTICULAR; INTRAMUSCULAR at 12:03

## 2023-08-24 NOTE — PROGRESS NOTES
I will proceed with a cortisone injection in the Right shoulder. Verbal and written consent was obtained for the injection. Skin was prepped with alcohol, 1 ml of Kenalog 40 mg and 9 ml of 0.25% Marcaine was injected to the posterior shoulder through the subacromial space of the Right Shoulder. The patient tolerated the injections well. I will see the patient back in 4-6 weeks.

## 2023-10-03 ENCOUNTER — OFFICE VISIT (OUTPATIENT)
Dept: PAIN MANAGEMENT | Age: 73
End: 2023-10-03
Payer: COMMERCIAL

## 2023-10-03 VITALS
BODY MASS INDEX: 26.07 KG/M2 | HEART RATE: 66 BPM | HEIGHT: 69 IN | OXYGEN SATURATION: 97 % | DIASTOLIC BLOOD PRESSURE: 90 MMHG | RESPIRATION RATE: 18 BRPM | TEMPERATURE: 97.7 F | SYSTOLIC BLOOD PRESSURE: 140 MMHG | WEIGHT: 176 LBS

## 2023-10-03 DIAGNOSIS — G56.22 ULNAR NEUROPATHY AT ELBOW, LEFT: ICD-10-CM

## 2023-10-03 DIAGNOSIS — M65.4 RADIAL STYLOID TENOSYNOVITIS: Primary | ICD-10-CM

## 2023-10-03 DIAGNOSIS — M18.12 ARTHRITIS OF CARPOMETACARPAL (CMC) JOINT OF LEFT THUMB: ICD-10-CM

## 2023-10-03 DIAGNOSIS — S60.222S CONTUSION OF LEFT HAND, SEQUELA: ICD-10-CM

## 2023-10-03 PROCEDURE — 99213 OFFICE O/P EST LOW 20 MIN: CPT | Performed by: ANESTHESIOLOGY

## 2023-10-03 RX ORDER — ALPRAZOLAM 0.5 MG/1
TABLET ORAL
COMMUNITY
Start: 2023-07-17

## 2023-10-03 RX ORDER — TIZANIDINE 4 MG/1
TABLET ORAL
COMMUNITY
Start: 2023-07-31

## 2023-10-03 NOTE — PROGRESS NOTES
[address], [facility]5 [address] [facility], [address] [place] [address]  [phone]    Follow up Note      Evan Dejuan. Date of Visit:  10/3/2023    CC:  Patient presents for follow up   Chief Complaint   Patient presents with    Follow-up     left stellate ganglion block under fluoroscopy guidance        HPI:    Left UE pain. Work related injury in 2015- injury to the left hand- s/p surgery initially-Did well. Repeat injury and subsequent treatment with Dr. [employer] [facility] case  POR : Dr. Siri Cody     S/P revision thumb basal joint arthroplasty with use of Arthrex mini tight rope system as well as concurrent wrist arthroscopy and debridement of peripheral/central tear of TFCC - in May 2021 by Dr. [employer] S/P ulnar nerve decompression at elbow with anterior subcutaneous transposition, carpal tunnel release, and radial sensory nerve neurectomy at the wrist on 6/8/2022. Continues to have pain and sensitivity over the radial forearm/ hand, wrist and over the scar at the medial elbow. Has done PT/ OT. Pain is constant and is described      Pain causes functional limitations/ limits Adl's : Yes     Prior treatment at [facility] pain clinic- was on chronic opioids for low back pain issues. Previous treatments:   Physical Therapy : yes,       Medications: - NSAID's : yes                        - Membrane stabilizers : yes                        - Opioids : yes,                        - Adjuvants or Others : yes      Surgeries: yes     Anticoagulation medications: no      H/O Smoking: no  H/O alcohol abuse : no  H/O Illicit drug use : no     Employment: employed- works in [employer]     EMG: 10/21/2021:  EMG was done today and showed a very mild radial sensory neuropathy, mild left carpal tunnel syndrome, mild left cubital tunnel syndrome. The patient was educated about the diagnosis and the prognosis. Imaging:   Pertinent image findings reviewed.

## 2023-10-05 ENCOUNTER — TELEPHONE (OUTPATIENT)
Dept: ORTHOPEDIC SURGERY | Age: 73
End: 2023-10-05

## 2023-10-05 NOTE — TELEPHONE ENCOUNTER
Pt called in, he has 2 questions. He is vidhi on 10/30/23, he only has 3 more physical therapy sessions left, he would like to know does Dr. Raina Redding want him to extend therapy? Also, should he r/s his appt on 10/30/23 to come in after he completes physical therapy? Please advise.  Solis Cameron can be reached at [phone] after 2pm.

## 2023-10-16 LAB — PSA SERPL-MCNC: 0.18 NG/ML (ref 0–4)

## 2023-10-30 ENCOUNTER — OFFICE VISIT (OUTPATIENT)
Dept: ORTHOPEDIC SURGERY | Age: 73
End: 2023-10-30

## 2023-10-30 VITALS — WEIGHT: 176 LBS | BODY MASS INDEX: 26.07 KG/M2 | HEIGHT: 69 IN | TEMPERATURE: 98 F

## 2023-10-30 DIAGNOSIS — S46.911A STRAIN OF RIGHT SHOULDER, INITIAL ENCOUNTER: Primary | ICD-10-CM

## 2023-10-30 DIAGNOSIS — S40.011A CONTUSION OF RIGHT SCAPULA, INITIAL ENCOUNTER: ICD-10-CM

## 2023-10-30 NOTE — PROGRESS NOTES
Chief Complaint   Patient presents with    Follow-up     Right Shoulder: Pt described a pinching sensation in the Right Shoulder. Pt has 7/10 pain at the time of visit. Rell Marshall returns today for follow up of his right shoulder pain. he reports that the pain in the shoulder is worse.  he has been going to physical therapy. The patient is right dominant. The patient's pain level is a 7/10. The patient did not respond to treatment. He has been dealing with the pain for 4 years.     Past Medical History:   Diagnosis Date    Cancer ([address]) 07/2018    prostate,  treatments of chemo at that time    History of blood transfusion     1985 after \"ulcer surgery\"    Hyperlipidemia     Hypertension     Left wrist pain     Pain     low back, sacral     Past Surgical History:   Procedure Laterality Date    ARM SURGERY Left 6/8/2022    LEFT ULNAR NERVE DECOMPRESSION AT ELBOW WITH TRANSPOSITION, LEFT RADIAL SENSORY NEUROLYSIS AND NEURECTOMY WITH NERVE CAP, REVISION DE QUERVAIN'S RELEASE performed by Adela Jose MD at [facility] Left 6/8/2022    LEFT CARPAL TUNNEL RELEASE performed by Adela Jose MD at [address] [place] [address] Right 07/31/2018    CATARACT REMOVAL WITH IMPLANT Left 07/02/2019    CYST REMOVAL      lt. axilla    HAND SURGERY      HEMORRHOID SURGERY      HERNIA REPAIR      INTRACAPSULAR CATARACT EXTRACTION Left 7/2/2019    LEFT EYE CATARACT EMULSIFICATION IOL IMPLANT - COMPLEX performed by Azeem Regan MD at [facility] Bilateral 08/17/2017    bilateral sacroiliac injection #1    NERVE BLOCK Bilateral 10/25/2017    Bilateral lumbar transforaminal epidural steroid injection    NERVE BLOCK Bilateral 11/02/2017    lumbar transforaminal epidural steriod injection #2    NERVE BLOCK Left 5/9/2023    LEFT STELLATE GANGLION BLOCK UNDER FLUOROSCOPY performed by Sheila Kang MD at [facility] Left 7/11/2023    left

## 2023-11-27 ENCOUNTER — TELEPHONE (OUTPATIENT)
Dept: ENT CLINIC | Age: 73
End: 2023-11-27

## 2023-11-27 NOTE — TELEPHONE ENCOUNTER
Pt lm on after hours line requesting a call back to let him know when his next apt with Dr Dayanara Salvador is.  Asked to be called back at [phone]

## 2023-11-28 DIAGNOSIS — E21.3 HYPERPARATHYROIDISM (HCC): ICD-10-CM

## 2023-11-28 LAB
CALCIUM SERPL-MCNC: 10.2 MG/DL (ref 8.6–10.2)
PTH INTACT: 92.9 PG/ML (ref 15–65)

## 2023-12-05 ENCOUNTER — OFFICE VISIT (OUTPATIENT)
Dept: ENT CLINIC | Age: 73
End: 2023-12-05
Payer: COMMERCIAL

## 2023-12-05 VITALS
SYSTOLIC BLOOD PRESSURE: 145 MMHG | WEIGHT: 175 LBS | BODY MASS INDEX: 25.84 KG/M2 | HEART RATE: 56 BPM | DIASTOLIC BLOOD PRESSURE: 91 MMHG

## 2023-12-05 DIAGNOSIS — E21.3 HYPERPARATHYROIDISM (HCC): Primary | ICD-10-CM

## 2023-12-05 PROCEDURE — 99213 OFFICE O/P EST LOW 20 MIN: CPT | Performed by: OTOLARYNGOLOGY

## 2023-12-05 PROCEDURE — 1123F ACP DISCUSS/DSCN MKR DOCD: CPT | Performed by: OTOLARYNGOLOGY

## 2023-12-05 ASSESSMENT — ENCOUNTER SYMPTOMS
COUGH: 0
SINUS PAIN: 0
VOMITING: 0
SORE THROAT: 0
SHORTNESS OF BREATH: 0
VOICE CHANGE: 0
TROUBLE SWALLOWING: 0
SINUS PRESSURE: 0

## 2023-12-05 NOTE — PROGRESS NOTES
[address] [facility]  Dr. Daren Townsend. Ms.Ed        Patient Name:  Suzanne Everett :  1950     CHIEF C/O:    Chief Complaint   Patient presents with    Follow-up     Labs and parathyroid        HISTORY OBTAINED FROM:  patient    HISTORY OF PRESENT ILLNESS:       Rolf Bello is a 74y.o. year old male, here today for follow up of: parathyroid pth 112 calcium 1.38 negative us asymptomatic     23: Patient here for follow up of hyperparathyroidism. Most recent PTH 93 and Ca 10.2. He denies any constipation, kidney stones, osteoporosis or psychiatric issues. Swallowing, breathing and speaking okay.              Past Medical History:   Diagnosis Date    Cancer ([address]) 2018    prostate,  treatments of chemo at that time    History of blood transfusion     1985 after \"ulcer surgery\"    Hyperlipidemia     Hypertension     Left wrist pain     Pain     low back, sacral     Past Surgical History:   Procedure Laterality Date    ARM SURGERY Left 2022    LEFT ULNAR NERVE DECOMPRESSION AT ELBOW WITH TRANSPOSITION, LEFT RADIAL SENSORY NEUROLYSIS AND NEURECTOMY WITH NERVE CAP, REVISION DE QUERVAIN'S RELEASE performed by Andrew Hwang MD at [facility] Left 2022    LEFT CARPAL TUNNEL RELEASE performed by Andrew Hwang MD at [address] [place] [address] Right 2018    CATARACT REMOVAL WITH IMPLANT Left 2019    CYST REMOVAL      lt. axilla    HAND SURGERY      HEMORRHOID SURGERY      HERNIA REPAIR      INTRACAPSULAR CATARACT EXTRACTION Left 2019    LEFT EYE CATARACT EMULSIFICATION IOL IMPLANT - COMPLEX performed by Theresa Whitney MD at [facility] Bilateral 2017    bilateral sacroiliac injection #1    NERVE BLOCK Bilateral 10/25/2017    Bilateral lumbar transforaminal epidural steroid injection    NERVE BLOCK Bilateral 2017    lumbar transforaminal epidural steriod injection #2    NERVE BLOCK Left 2023    LEFT

## 2024-01-02 ENCOUNTER — HOSPITAL ENCOUNTER (EMERGENCY)
Age: 74
Discharge: HOME | End: 2024-01-02
Attending: EMERGENCY MEDICINE
Payer: MEDICARE

## 2024-01-02 ENCOUNTER — APPOINTMENT (OUTPATIENT)
Dept: CT IMAGING | Age: 74
End: 2024-01-02
Payer: MEDICARE

## 2024-01-02 ENCOUNTER — APPOINTMENT (OUTPATIENT)
Dept: ULTRASOUND IMAGING | Age: 74
End: 2024-01-02
Payer: MEDICARE

## 2024-01-02 VITALS
TEMPERATURE: 97.8 F | WEIGHT: 165 LBS | BODY MASS INDEX: 24.37 KG/M2 | HEART RATE: 56 BPM | OXYGEN SATURATION: 97 % | DIASTOLIC BLOOD PRESSURE: 71 MMHG | RESPIRATION RATE: 18 BRPM | SYSTOLIC BLOOD PRESSURE: 146 MMHG

## 2024-01-02 DIAGNOSIS — N45.3 EPIDIDYMOORCHITIS: ICD-10-CM

## 2024-01-02 DIAGNOSIS — N43.3 HYDROCELE, UNSPECIFIED HYDROCELE TYPE: ICD-10-CM

## 2024-01-02 DIAGNOSIS — R52 PAIN: ICD-10-CM

## 2024-01-02 DIAGNOSIS — N30.00 ACUTE CYSTITIS WITHOUT HEMATURIA: Primary | ICD-10-CM

## 2024-01-02 LAB
ALBUMIN SERPL-MCNC: 4.1 G/DL (ref 3.5–5.2)
ALP SERPL-CCNC: 97 U/L (ref 40–129)
ALT SERPL-CCNC: 20 U/L (ref 0–40)
ANION GAP SERPL CALCULATED.3IONS-SCNC: 9 MMOL/L (ref 7–16)
AST SERPL-CCNC: 17 U/L (ref 0–39)
BACTERIA URNS QL MICRO: ABNORMAL
BASOPHILS # BLD: 0.08 K/UL (ref 0–0.2)
BASOPHILS NFR BLD: 1 % (ref 0–2)
BILIRUB SERPL-MCNC: 0.2 MG/DL (ref 0–1.2)
BILIRUB UR QL STRIP: NEGATIVE
BUN SERPL-MCNC: 18 MG/DL (ref 6–23)
CALCIUM SERPL-MCNC: 10.6 MG/DL (ref 8.6–10.2)
CHLORIDE SERPL-SCNC: 103 MMOL/L (ref 98–107)
CLARITY UR: CLEAR
CO2 SERPL-SCNC: 28 MMOL/L (ref 22–29)
COLOR UR: YELLOW
CREAT SERPL-MCNC: 1.4 MG/DL (ref 0.7–1.2)
EOSINOPHIL # BLD: 0.15 K/UL (ref 0.05–0.5)
EOSINOPHILS RELATIVE PERCENT: 2 % (ref 0–6)
ERYTHROCYTE [DISTWIDTH] IN BLOOD BY AUTOMATED COUNT: 15.9 % (ref 11.5–15)
GFR SERPL CREATININE-BSD FRML MDRD: 51 ML/MIN/1.73M2
GLUCOSE SERPL-MCNC: 123 MG/DL (ref 74–99)
GLUCOSE UR STRIP-MCNC: NEGATIVE MG/DL
HCT VFR BLD AUTO: 46.3 % (ref 37–54)
HGB BLD-MCNC: 15 G/DL (ref 12.5–16.5)
HGB UR QL STRIP.AUTO: NEGATIVE
KETONES UR STRIP-MCNC: ABNORMAL MG/DL
LEUKOCYTE ESTERASE UR QL STRIP: NEGATIVE
LYMPHOCYTES NFR BLD: 2.13 K/UL (ref 1.5–4)
LYMPHOCYTES RELATIVE PERCENT: 24 % (ref 20–42)
MCH RBC QN AUTO: 29.3 PG (ref 26–35)
MCHC RBC AUTO-ENTMCNC: 32.4 G/DL (ref 32–34.5)
MCV RBC AUTO: 90.4 FL (ref 80–99.9)
MONOCYTES NFR BLD: 0.99 K/UL (ref 0.1–0.95)
MONOCYTES NFR BLD: 11 % (ref 2–12)
MYELOCYTES ABSOLUTE COUNT: 0.3 K/UL
MYELOCYTES: 3 %
NEUTROPHILS NFR BLD: 59 % (ref 43–80)
NEUTS SEG NFR BLD: 5.25 K/UL (ref 1.8–7.3)
NITRITE UR QL STRIP: POSITIVE
NUCLEATED RED BLOOD CELLS: 3 PER 100 WBC
PH UR STRIP: 6 [PH] (ref 5–9)
PLATELET # BLD AUTO: 210 K/UL (ref 130–450)
PMV BLD AUTO: 10.8 FL (ref 7–12)
POTASSIUM SERPL-SCNC: 5.8 MMOL/L (ref 3.5–5)
PROT SERPL-MCNC: 7.2 G/DL (ref 6.4–8.3)
PROT UR STRIP-MCNC: NEGATIVE MG/DL
RBC # BLD AUTO: 5.12 M/UL (ref 3.8–5.8)
RBC # BLD: ABNORMAL 10*6/UL
RBC #/AREA URNS HPF: ABNORMAL /HPF
SODIUM SERPL-SCNC: 140 MMOL/L (ref 132–146)
SP GR UR STRIP: 1.01 (ref 1–1.03)
UROBILINOGEN UR STRIP-ACNC: 0.2 EU/DL (ref 0–1)
WBC #/AREA URNS HPF: ABNORMAL /HPF
WBC OTHER # BLD: 8.9 K/UL (ref 4.5–11.5)

## 2024-01-02 PROCEDURE — 6360000004 HC RX CONTRAST MEDICATION: Performed by: RADIOLOGY

## 2024-01-02 PROCEDURE — 74177 CT ABD & PELVIS W/CONTRAST: CPT

## 2024-01-02 PROCEDURE — 76870 US EXAM SCROTUM: CPT

## 2024-01-02 PROCEDURE — 85025 COMPLETE CBC W/AUTO DIFF WBC: CPT

## 2024-01-02 PROCEDURE — 80053 COMPREHEN METABOLIC PANEL: CPT

## 2024-01-02 PROCEDURE — 87086 URINE CULTURE/COLONY COUNT: CPT

## 2024-01-02 PROCEDURE — 87077 CULTURE AEROBIC IDENTIFY: CPT

## 2024-01-02 PROCEDURE — 93976 VASCULAR STUDY: CPT

## 2024-01-02 PROCEDURE — 99285 EMERGENCY DEPT VISIT HI MDM: CPT

## 2024-01-02 PROCEDURE — 96374 THER/PROPH/DIAG INJ IV PUSH: CPT

## 2024-01-02 PROCEDURE — 81001 URINALYSIS AUTO W/SCOPE: CPT

## 2024-01-02 PROCEDURE — 2580000003 HC RX 258: Performed by: EMERGENCY MEDICINE

## 2024-01-02 PROCEDURE — 96375 TX/PRO/DX INJ NEW DRUG ADDON: CPT

## 2024-01-02 PROCEDURE — 6360000002 HC RX W HCPCS: Performed by: EMERGENCY MEDICINE

## 2024-01-02 RX ORDER — CEFDINIR 300 MG/1
300 CAPSULE ORAL 2 TIMES DAILY
Qty: 20 CAPSULE | Refills: 0 | Status: SHIPPED | OUTPATIENT
Start: 2024-01-02 | End: 2024-01-12

## 2024-01-02 RX ORDER — KETOROLAC TROMETHAMINE 15 MG/ML
15 INJECTION, SOLUTION INTRAMUSCULAR; INTRAVENOUS ONCE
Status: COMPLETED | OUTPATIENT
Start: 2024-01-02 | End: 2024-01-02

## 2024-01-02 RX ORDER — ACETAMINOPHEN 500 MG
1000 TABLET ORAL ONCE
Status: DISCONTINUED | OUTPATIENT
Start: 2024-01-02 | End: 2024-01-02 | Stop reason: HOSPADM

## 2024-01-02 RX ADMIN — WATER 1000 MG: 1 INJECTION INTRAMUSCULAR; INTRAVENOUS; SUBCUTANEOUS at 09:50

## 2024-01-02 RX ADMIN — IOPAMIDOL 75 ML: 755 INJECTION, SOLUTION INTRAVENOUS at 08:40

## 2024-01-02 RX ADMIN — KETOROLAC TROMETHAMINE 15 MG: 15 INJECTION, SOLUTION INTRAMUSCULAR; INTRAVENOUS at 08:53

## 2024-01-02 ASSESSMENT — LIFESTYLE VARIABLES
HOW OFTEN DO YOU HAVE A DRINK CONTAINING ALCOHOL: NEVER
HOW MANY STANDARD DRINKS CONTAINING ALCOHOL DO YOU HAVE ON A TYPICAL DAY: PATIENT DOES NOT DRINK

## 2024-01-02 NOTE — ED PROVIDER NOTES
ED PROVIDER NOTE    Chief Complaint   Patient presents with    Groin Pain     Groin pain since Friday; no injury. Pt states the pain is constant. No trouble urinating; no other complaints.       HPI:  1/2/24,   Time: 6:46 AM LEX Puri Jr. is a 73 y.o. male presenting to the ED for left groin pain.  Ongoing for the last 4 days, progressively worsening, moderate in severity.  Pain radiates from the left lower quadrant to the left groin.  Has also noticed associated swelling of the left hemiscrotum.  No associated fever, chills, cough, chest pain, shortness of breath, nausea, vomiting.  Last bowel movement was this morning and normal.  Does have history of abdominal surgery for perforated ulcer.  Normal p.o. intake and urine output.    Chart review: hx of PUD, HTN, HLD    Reviewed CT A/P from 2017:  IMPRESSION:  1. Lesion in the upper pole the right kidney is compatible with a  benign cyst.  2. Nodular lesion above the left adrenal gland is compatible with  residual splenic tissue.      Review of Systems:     Review of Systems  Pertinent positives and negatives as stated in HPI     --------------------------------------------- PAST HISTORY ---------------------------------------------  Past Medical History:   Past Medical History:   Diagnosis Date    Cancer ([facility]) 07/2018    prostate,  treatments of chemo at that time    History of blood transfusion     1985 after \"ulcer surgery\"    Hyperlipidemia     Hypertension     Left wrist pain     Pain     low back, sacral       Past Surgical History:   Past Surgical History:   Procedure Laterality Date    ARM SURGERY Left 6/8/2022    LEFT ULNAR NERVE DECOMPRESSION AT ELBOW WITH TRANSPOSITION, LEFT RADIAL SENSORY NEUROLYSIS AND NEURECTOMY WITH NERVE CAP, REVISION DE QUERVAIN'S RELEASE performed by Ryan Gama MD at [facility] OR    CARPAL TUNNEL RELEASE Left 6/8/2022    LEFT CARPAL TUNNEL RELEASE performed by Ryan Gama MD at [facility] OR    CATARACT REMOVAL WITH

## 2024-01-02 NOTE — DISCHARGE INSTRUCTIONS
Return to the ER if you have worsening pain, vomiting, unable to keep down fluids, decreased urine output, fever, or any other new or concerning symptoms.     Take all antibiotics until complete.

## 2024-01-02 NOTE — DISCHARGE INSTR - COC
Bearin}  Other Medical Equipment (for information only, NOT a DME order):  {EQUIPMENT:127447881}  Other Treatments: ***    Patient's personal belongings (please select all that are sent with patient):  {CHP DME Belongings:469724032}    RN SIGNATURE:  {Esignature:187378747}    CASE MANAGEMENT/SOCIAL WORK SECTION    Inpatient Status Date: ***    Readmission Risk Assessment Score:  Readmission Risk              Risk of Unplanned Readmission:  0           Discharging to Facility/ Agency   Name:   Address:  Phone:  Fax:    Dialysis Facility (if applicable)   Name:  Address:  Dialysis Schedule:  Phone:  Fax:    / signature: {Esignature:015502209}    PHYSICIAN SECTION    Prognosis: {Prognosis:1608453284}    Condition at Discharge: { Patient Condition:182936695}    Rehab Potential (if transferring to Rehab): {Prognosis:2582821960}    Recommended Labs or Other Treatments After Discharge: ***    Physician Certification: I certify the above information and transfer of Troy Puri Jr.  is necessary for the continuing treatment of the diagnosis listed and that he requires {Admit to Appropriate Level of Care:91944} for {GREATER/LESS:972275146} 30 days.     Update Admission H&P: {CHP DME Changes in HandP:181390279}    PHYSICIAN SIGNATURE:  {Esignature:875438055}

## 2024-01-04 LAB
MICROORGANISM SPEC CULT: ABNORMAL
SPECIMEN DESCRIPTION: ABNORMAL

## 2024-04-02 DIAGNOSIS — M79.602 LEFT ARM PAIN: Primary | ICD-10-CM

## 2024-04-04 ENCOUNTER — PROCEDURE VISIT (OUTPATIENT)
Dept: PHYSICAL MEDICINE AND REHAB | Age: 74
End: 2024-04-04

## 2024-04-04 VITALS — HEIGHT: 69 IN | BODY MASS INDEX: 24.44 KG/M2 | WEIGHT: 165 LBS

## 2024-04-04 DIAGNOSIS — M79.602 LEFT ARM PAIN: ICD-10-CM

## 2024-04-04 NOTE — PROGRESS NOTES
[facility]  *Accredited [facility] the [facility] with exemplary status  [address] [place] [address]  Phone: [phone]  Fax: [phone]    Referring Provider: June Felipe DO  Primary Care Physician: Martha Hanley MD  Patient Name: Troy Puri Jr.  Patient YOB: 1950  Gender: male  BMI: Body mass index is 24.37 kg/m².  Height 1.753 m (5' 9\"), weight 74.8 kg (165 lb).    4/4/2024    Reason for Referral: Numbness and tingling    Description of clinical problem:   Chief Complaint   Patient presents with    Extremity Pain     Pain in the wrist and in the hand. Pain will travel into the elbow.     Numbness     Numbness/tingling in the fingers. This will come and go.     Extremity Weakness     N/a        Sensory NCS      Nerve / Sites Rec. Site Peak Lat PP Amp Segments Distance Velocity Temp.     ms µV  cm m/s °C   L Median - Digit II (Antidromic)      Palm Dig II 2.55 82.0 Palm - Dig II 7 42 32      Wrist Dig II 4.32* 59.2 Wrist - Dig II 14 41 32   L Ulnar - Digit V (Antidromic)      Wrist Dig V 3.96 27.8 Wrist - Dig V 14 46 32.4   L Radial - Anatomical snuff box (Forearm)      Forearm Wrist 3.33 10.8* Forearm - Wrist 10 38 32.4       Motor NCS      Nerve / Sites Muscle Onset Amplitude Segments Distance Velocity Temp.     ms mV  cm m/s °C   L Median - APB      Palm APB 2.24 16.7 Palm - APB   32.1      Wrist APB 4.01 13.6 Wrist - Palm 8 45* 32.2      Elbow APB 8.39 10.0 Elbow - Wrist 21 48* 32.2   L Ulnar - ADM      Wrist ADM 3.54 11.1 Wrist - ADM 8  32.1      B.Elbow ADM 7.92 9.1 B.Elbow - Wrist 21 48 32.1      A.Elbow ADM 10.05 8.4 A.Elbow - B.Elbow 10 47 32.1       F Wave      Nerve Fmin % F    ms %   L Median - APB 27.71 90   L Ulnar - ADM 29.79 40       EMG      EMG Summary Table     Spontaneous MUAP Recruitment   Muscle Nerve Roots IA Fib PSW Fasc Amp Dur. PPP Pattern   L. Biceps brachii Musculocutaneous C5-C6 N None None

## 2024-06-05 NOTE — PROGRESS NOTES
Chief Complaint   Patient presents with    Shoulder Pain     WC right shoulder pain for a period of time. Had no treatment for shoulder. Obtained MRI already but unsure of what MRI showed. Patient is right hand dominant. Having hard time with ROM in shoulder. Patient has had surgery on left hand so he has to over use right shoulder at times causing more pain. Kyra Sanchez is a 74y.o. year old   male who is seen today  for evaluation of right shoulder pain. He reports the pain has been ongoing for the past 4 years. He does recall a specific injury which started the pain. He reports the pain is worse with activity, better with rest.  The patient does have mechanical symptoms. Hedoes have night pain. He denies a feeling of instability. The prior treatments have been none. The patient   has not responded to the treatment. The patient is right hand dominant. The patient is working. 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 Chief Complaint   Patient presents with    Shoulder Pain     WC right shoulder pain for a period of time. Had no treatment for shoulder. Obtained MRI already but unsure of what MRI showed. Patient is right hand dominant. Having hard time with ROM in shoulder. Patient has had surgery on left hand so he has to over use right shoulder at times causing more pain.       Past Medical History:   Diagnosis Date    Cancer ([address]) 07/2018    prostate,  treatments of chemo at that time    History of blood transfusion     1985 after \"ulcer surgery\"    Hyperlipidemia     Hypertension     Left wrist pain     Pain     low back, sacral     Past Surgical History:   Procedure Laterality Date    ARM SURGERY Left 6/8/2022    LEFT ULNAR NERVE DECOMPRESSION AT ELBOW WITH TRANSPOSITION, LEFT RADIAL SENSORY NEUROLYSIS AND NEURECTOMY WITH NERVE CAP, REVISION DE QUERVAIN'S RELEASE performed by Shawna Montaño MD at [facility] Left 6/8/2022    LEFT CARPAL Render In Strict Bullet Format?: No Detail Level: Zone Initiate Treatment: Isotretinoin 30mg BID pending bw

## 2024-12-16 DIAGNOSIS — E21.3 HYPERPARATHYROIDISM (HCC): Primary | ICD-10-CM

## 2024-12-17 ENCOUNTER — TELEPHONE (OUTPATIENT)
Dept: ENT CLINIC | Age: 74
End: 2024-12-17

## 2024-12-17 NOTE — TELEPHONE ENCOUNTER
Called patient and advised on obtaining lab work prior to appointment. Patient verbalized understanding, will complete at [facility] prior to appointment.

## 2024-12-17 NOTE — TELEPHONE ENCOUNTER
Patient would like to know if he needs to have his labs done before his appointment with Dr Jaramillo on 1/29/25.  If so where?  Please  advise.

## 2025-01-10 ENCOUNTER — TELEPHONE (OUTPATIENT)
Dept: ENT CLINIC | Age: 75
End: 2025-01-10

## 2025-01-10 NOTE — TELEPHONE ENCOUNTER
Pt called in, he stated he received a phone call from the office and was calling back. Troy can be reached at [phone]

## 2025-01-17 DIAGNOSIS — E21.3 HYPERPARATHYROIDISM (HCC): ICD-10-CM

## 2025-01-17 LAB
CALCIUM SERPL-MCNC: 10.1 MG/DL (ref 8.6–10.2)
PTH INTACT: 94 PG/ML (ref 15–65)

## 2025-01-29 ENCOUNTER — OFFICE VISIT (OUTPATIENT)
Dept: ENT CLINIC | Age: 75
End: 2025-01-29

## 2025-01-29 VITALS
HEART RATE: 69 BPM | SYSTOLIC BLOOD PRESSURE: 130 MMHG | HEIGHT: 69 IN | DIASTOLIC BLOOD PRESSURE: 80 MMHG | BODY MASS INDEX: 25.12 KG/M2 | WEIGHT: 169.6 LBS

## 2025-01-29 DIAGNOSIS — E21.3 HYPERPARATHYROIDISM (HCC): Primary | ICD-10-CM

## 2025-01-29 NOTE — PROGRESS NOTES
Acetaminophen  Social History     Tobacco Use   • Smoking status: Never   • Smokeless tobacco: Never   Vaping Use   • Vaping status: Never Used   Substance Use Topics   • Alcohol use: No   • Drug use: No     Family History   Problem Relation Age of Onset   • Cancer Mother    • Cancer Father        Review of Systems   Constitutional:  Negative for chills and fever.   HENT:  Negative for ear discharge and hearing loss.    Respiratory:  Negative for cough and shortness of breath.    Cardiovascular:  Negative for chest pain and palpitations.   Gastrointestinal:  Negative for vomiting.   Skin:  Negative for rash.   Allergic/Immunologic: Negative for environmental allergies.   Neurological:  Negative for dizziness and headaches.   Hematological:  Does not bruise/bleed easily.   All other systems reviewed and are negative.      BP (!) 148/95 (Site: Right Upper Arm, Position: Sitting, Cuff Size: Medium Adult)   Pulse 69   Ht 1.753 m (5' 9\")   Wt 76.9 kg (169 lb 9.6 oz)   BMI 25.05 kg/m²   Physical Exam  Vitals and nursing note reviewed.   Constitutional:       Appearance: He is well-developed.   HENT:      Head: Normocephalic and atraumatic.      Right Ear: Tympanic membrane and ear canal normal.      Left Ear: Tympanic membrane and ear canal normal.      Nose: No congestion or rhinorrhea.   Eyes:      Pupils: Pupils are equal, round, and reactive to light.   Neck:      Thyroid: No thyromegaly.      Trachea: No tracheal deviation.   Cardiovascular:      Rate and Rhythm: Normal rate.   Pulmonary:      Effort: Pulmonary effort is normal. No respiratory distress.   Musculoskeletal:         General: Normal range of motion.      Cervical back: Normal range of motion.   Lymphadenopathy:      Cervical: No cervical adenopathy.   Skin:     General: Skin is warm.      Findings: No erythema.   Neurological:      Mental Status: He is alert.      Cranial Nerves: No cranial nerve deficit.       IMPRESSION/PLAN:  1.

## 2025-02-01 ASSESSMENT — ENCOUNTER SYMPTOMS
VOMITING: 0
COUGH: 0
SHORTNESS OF BREATH: 0

## 2025-04-25 ENCOUNTER — HOSPITAL ENCOUNTER (OUTPATIENT)
Age: 75
Discharge: HOME | End: 2025-04-25
Payer: MEDICARE

## 2025-04-25 LAB
25(OH)D3 SERPL-MCNC: 40.7 NG/ML (ref 30–100)
ANION GAP SERPL CALCULATED.3IONS-SCNC: 12 MMOL/L (ref 7–16)
BACTERIA URNS QL MICRO: ABNORMAL
BILIRUB UR QL STRIP: ABNORMAL
BUN SERPL-MCNC: 19 MG/DL (ref 6–23)
CALCIUM SERPL-MCNC: 10.3 MG/DL (ref 8.6–10.2)
CASTS #/AREA URNS LPF: ABNORMAL /LPF
CHLORIDE SERPL-SCNC: 101 MMOL/L (ref 98–107)
CLARITY UR: CLEAR
CO2 SERPL-SCNC: 26 MMOL/L (ref 22–29)
COLOR UR: YELLOW
CREAT SERPL-MCNC: 1.6 MG/DL (ref 0.7–1.2)
CREAT UR-MCNC: 473 MG/DL (ref 40–278)
EPI CELLS #/AREA URNS HPF: ABNORMAL /HPF
ERYTHROCYTE [DISTWIDTH] IN BLOOD BY AUTOMATED COUNT: 15.5 % (ref 11.5–15)
GFR, ESTIMATED: 46 ML/MIN/1.73M2
GLUCOSE SERPL-MCNC: 81 MG/DL (ref 74–99)
GLUCOSE UR STRIP-MCNC: NEGATIVE MG/DL
HCT VFR BLD AUTO: 44 % (ref 37–54)
HGB BLD-MCNC: 14.2 G/DL (ref 12.5–16.5)
HGB UR QL STRIP.AUTO: ABNORMAL
KETONES UR STRIP-MCNC: ABNORMAL MG/DL
LEUKOCYTE ESTERASE UR QL STRIP: NEGATIVE
MAGNESIUM SERPL-MCNC: 2.1 MG/DL (ref 1.6–2.6)
MCH RBC QN AUTO: 29.1 PG (ref 26–35)
MCHC RBC AUTO-ENTMCNC: 32.3 G/DL (ref 32–34.5)
MCV RBC AUTO: 90.2 FL (ref 80–99.9)
MICROALBUMIN UR-MCNC: 48 MG/L (ref 0–20)
MICROALBUMIN/CREAT UR-RTO: 10 MCG/MG CREAT (ref 0–30)
MUCOUS THREADS URNS QL MICRO: PRESENT
NITRITE UR QL STRIP: POSITIVE
PH UR STRIP: 6 [PH] (ref 5–8)
PHOSPHATE SERPL-MCNC: 3 MG/DL (ref 2.5–4.5)
PLATELET # BLD AUTO: 188 K/UL (ref 130–450)
PMV BLD AUTO: 11.1 FL (ref 7–12)
POTASSIUM SERPL-SCNC: 5.3 MMOL/L (ref 3.5–5)
PROT UR STRIP-MCNC: ABNORMAL MG/DL
PTH-INTACT SERPL-MCNC: 105 PG/ML (ref 15–65)
RBC # BLD AUTO: 4.88 M/UL (ref 3.8–5.8)
RBC #/AREA URNS HPF: ABNORMAL /HPF
SODIUM SERPL-SCNC: 139 MMOL/L (ref 132–146)
SP GR UR STRIP: >1.03 (ref 1–1.03)
URATE SERPL-MCNC: 7.8 MG/DL (ref 3.4–7)
UROBILINOGEN UR STRIP-ACNC: 0.2 EU/DL (ref 0–1)
WBC #/AREA URNS HPF: ABNORMAL /HPF
WBC OTHER # BLD: 3.1 K/UL (ref 4.5–11.5)

## 2025-04-25 PROCEDURE — 83735 ASSAY OF MAGNESIUM: CPT

## 2025-04-25 PROCEDURE — 81001 URINALYSIS AUTO W/SCOPE: CPT

## 2025-04-25 PROCEDURE — 82570 ASSAY OF URINE CREATININE: CPT

## 2025-04-25 PROCEDURE — 84550 ASSAY OF BLOOD/URIC ACID: CPT

## 2025-04-25 PROCEDURE — 83970 ASSAY OF PARATHORMONE: CPT

## 2025-04-25 PROCEDURE — 85027 COMPLETE CBC AUTOMATED: CPT

## 2025-04-25 PROCEDURE — 84100 ASSAY OF PHOSPHORUS: CPT

## 2025-04-25 PROCEDURE — 82306 VITAMIN D 25 HYDROXY: CPT

## 2025-04-25 PROCEDURE — 80048 BASIC METABOLIC PNL TOTAL CA: CPT

## 2025-04-25 PROCEDURE — 82043 UR ALBUMIN QUANTITATIVE: CPT

## 2025-04-25 PROCEDURE — 36415 COLL VENOUS BLD VENIPUNCTURE: CPT

## (undated) DEVICE — NEEDLE HYPO 25GA L1.5IN BLU POLYPR HUB S STL REG BVL STR

## (undated) DEVICE — GAUZE,SPONGE,4"X4",12PLY,STERILE,LF,2'S: Brand: MEDLINE

## (undated) DEVICE — 22GX5" WHITACRE SPINAL NEEDLE: Brand: MEDLINE

## (undated) DEVICE — COUNTER NDL 10 COUNT HLD 20 FOAM BLK SGL MAG

## (undated) DEVICE — SPLINT ORTH W4XL15IN PLSTR OF PARIS LO EXOTHERM SMOOTH

## (undated) DEVICE — GOWN,SIRUS,FABRNF,XL,20/CS: Brand: MEDLINE

## (undated) DEVICE — 6 ML SYRINGE LUER-LOCK TIP: Brand: MONOJECT

## (undated) DEVICE — SOLUTION IV IRRIG POUR BRL 0.9% SODIUM CHL 2F7124

## (undated) DEVICE — STRIP,CLOSURE,WOUND,MEDI-STRIP,1/4X3: Brand: MEDLINE

## (undated) DEVICE — CRADLE ARM W8.75XH12.5XL16IN FOAM SUPP ELEVATION VENT

## (undated) DEVICE — APPLICATOR MEDICATED 10.5 CC SOLUTION HI LT ORNG CHLORAPREP

## (undated) DEVICE — TUBING PMP L16FT MAIN DISP FOR AR-6400 AR-6475

## (undated) DEVICE — ADHESIVE SKIN CLSR 0.7ML TOP DERMBND ADV

## (undated) DEVICE — ZIMMER® STERILE DISPOSABLE TOURNIQUET CUFF WITH PLC, DUAL PORT, SINGLE BLADDER, 18 IN. (46 CM)

## (undated) DEVICE — TRAY  WRIST TOWER REUSABLE

## (undated) DEVICE — PADDING UNDERCAST W4INXL4YD COT FBR LO LINTING WYTEX

## (undated) DEVICE — SPLINT ORTH W5XL30IN PLSTR OF PARIS LO EXOTHERM SMOOTH

## (undated) DEVICE — 3 ML SYRINGE LUER-LOCK TIP: Brand: MONOJECT

## (undated) DEVICE — SOLUTION IV IRRIG WATER 1000ML POUR BRL 2F7114

## (undated) DEVICE — OSTEOTOME SURG L5IN BLDE W15MM STR MINI LAMBOTTE

## (undated) DEVICE — SOLUTION SCRB 32OZ 7.5% POVIDONE IOD BTL GENTLE EFFECTIVE

## (undated) DEVICE — INSTRUMENT STRYKER SMALL JOINT/WRIST ARTHROSCOPY PAN REUSABLE

## (undated) DEVICE — SURGICAL PROCEDURE PACK CATRCT LT EYE BASIC CUST ST JOS LF

## (undated) DEVICE — INTENDED FOR TISSUE SEPARATION, AND OTHER PROCEDURES THAT REQUIRE A SHARP SURGICAL BLADE TO PUNCTURE OR CUT.: Brand: BARD-PARKER ® STAINLESS STEEL BLADES

## (undated) DEVICE — SOLUTION IV IRRIG LACTATED RINGERS 3000ML 2B7487

## (undated) DEVICE — SYSTEM TPS ORTHO

## (undated) DEVICE — STERILE POLYISOPRENE POWDER-FREE SURGICAL GLOVES: Brand: PROTEXIS

## (undated) DEVICE — TOWEL OR BLUEE 16X26IN ST 8 PACK ORB08 16X26ORTWL

## (undated) DEVICE — HEWSON SUTURE RETRIEVER: Brand: HEWSON SUTURE RETRIEVER

## (undated) DEVICE — GLOVE ORANGE PI 8 1/2   MSG9085

## (undated) DEVICE — 3M™ RED DOT™ MONITORING ELECTRODE WITH FOAM TAPE AND STICKY GEL 2560, 50/BAG, 20/CASE, 72/PLT: Brand: RED DOT™

## (undated) DEVICE — NEEDLE HYPO 18GA L1.5IN PNK POLYPR HUB S STL THN WALL FILL

## (undated) DEVICE — 2.0MM ROUND SOLID CARBIDE BUR MEDIUM

## (undated) DEVICE — BNDG,ELSTC,MATRIX,STRL,2"X5YD,LF,HOOK&LP: Brand: MEDLINE

## (undated) DEVICE — SUTURE FIBERLOOP 4-0 L10IN NONABSORBABLE BLU L17.9MM 3/8 AR722920

## (undated) DEVICE — GLOVE ORANGE PI 7   MSG9070

## (undated) DEVICE — COVER HNDL LT DISP

## (undated) DEVICE — INSTRUMENT STRYKER SHAVER REUSABLE

## (undated) DEVICE — TRAY SET HAND REUSABLE

## (undated) DEVICE — TUBING, SUCTION, 9/32" X 10', STRAIGHT: Brand: MEDLINE

## (undated) DEVICE — DOUBLE BASIN SET: Brand: MEDLINE INDUSTRIES, INC.

## (undated) DEVICE — PADDING,UNDERCAST,COTTON, 4"X4YD STERILE: Brand: MEDLINE

## (undated) DEVICE — 12 ML SYRINGE,LUER-LOCK TIP: Brand: MONOJECT

## (undated) DEVICE — 4-PORT MANIFOLD: Brand: NEPTUNE 2

## (undated) DEVICE — NEEDLE SPNL 25GA L2IN BLU SHT NEO LUM PUNC DISP

## (undated) DEVICE — BNDG,ELSTC,MATRIX,STRL,3"X5YD,LF,HOOK&LP: Brand: MEDLINE

## (undated) DEVICE — LOOP VES W25MM THK1MM MAXI RED SIL FLD REPELLENT 100 PER

## (undated) DEVICE — SUTURE ETHLN 10-0 L12IN NONABSORBABLE BLK CS140-8 L6.5MM 9033G

## (undated) DEVICE — ENCORE® LATEX MICRO SIZE 8.5, STERILE LATEX POWDER-FREE SURGICAL GLOVE: Brand: ENCORE

## (undated) DEVICE — GLOVE SURG SZ 65 L12IN FNGR THK79MIL GRN LTX FREE

## (undated) DEVICE — SYRINGE 20ML LL S/C 50

## (undated) DEVICE — BANDAGE ADH W1XL3IN NAT FAB WVN FLX DURABLE N ADH PD SEAL

## (undated) DEVICE — MASTISOL ADHESIVE LIQ 2/3ML

## (undated) DEVICE — SURGICAL PROCEDURE PACK HND

## (undated) DEVICE — Device

## (undated) DEVICE — [SMALL-JOINT FULL RADIUS CUTTER, ARTHROSCOPIC SHAVER BLADE,  DO NOT RESTERILIZE,  DO NOT USE IF PACKAGE IS DAMAGED,  KEEP DRY,  KEEP AWAY FROM SUNLIGHT]: Brand: FORMULA

## (undated) DEVICE — GLOVE SURG SZ 6 THK91MIL LTX FREE SYN POLYISOPRENE ANTI

## (undated) DEVICE — DRAPE,U/ SHT,SPLIT,PLAS,STERIL: Brand: MEDLINE

## (undated) DEVICE — INSTRUMENT SYSTEM 4 BATTERY REUSABLE

## (undated) DEVICE — INSTRUMENT STRYKER SMALL JOINT/WRIST LENS REUSABLE

## (undated) DEVICE — TRAY DRILL SYSTEM 4 REUSABLE

## (undated) DEVICE — PADDING,UNDERCAST,COTTON, 3X4YD STERILE: Brand: MEDLINE

## (undated) DEVICE — DRESSING,GAUZE,XEROFORM,CURAD,1"X8",ST: Brand: CURAD

## (undated) DEVICE — STANDARD HYPODERMIC NEEDLE,POLYPROPYLENE HUB: Brand: MONOJECT

## (undated) DEVICE — CAMERA STRYKER 1488 HD GEN

## (undated) DEVICE — GAUZE,SPONGE,4"X4",8PLY,STRL,LF,10/TRAY: Brand: MEDLINE

## (undated) DEVICE — STRIP,CLOSURE,WOUND,MEDI-STRIP,1/2X4: Brand: MEDLINE